# Patient Record
Sex: FEMALE | Race: WHITE | Employment: OTHER | URBAN - NONMETROPOLITAN AREA
[De-identification: names, ages, dates, MRNs, and addresses within clinical notes are randomized per-mention and may not be internally consistent; named-entity substitution may affect disease eponyms.]

---

## 2017-01-09 ENCOUNTER — TELEPHONE (OUTPATIENT)
Dept: FAMILY MEDICINE CLINIC | Age: 82
End: 2017-01-09

## 2017-01-09 ENCOUNTER — NURSE ONLY (OUTPATIENT)
Dept: FAMILY MEDICINE CLINIC | Age: 82
End: 2017-01-09

## 2017-01-09 DIAGNOSIS — E11.9 TYPE 2 DIABETES MELLITUS WITHOUT COMPLICATION, WITHOUT LONG-TERM CURRENT USE OF INSULIN (HCC): Primary | ICD-10-CM

## 2017-01-09 LAB — HBA1C MFR BLD: 7.5 %

## 2017-01-09 PROCEDURE — 83036 HEMOGLOBIN GLYCOSYLATED A1C: CPT | Performed by: FAMILY MEDICINE

## 2017-03-31 DIAGNOSIS — R80.9 PROTEINURIA: ICD-10-CM

## 2017-03-31 DIAGNOSIS — E11.9 TYPE 2 DIABETES MELLITUS WITHOUT COMPLICATION (HCC): ICD-10-CM

## 2017-03-31 DIAGNOSIS — I10 ESSENTIAL HYPERTENSION: ICD-10-CM

## 2017-03-31 DIAGNOSIS — E78.00 PURE HYPERCHOLESTEROLEMIA: ICD-10-CM

## 2017-04-04 RX ORDER — LISINOPRIL 30 MG/1
TABLET ORAL
Qty: 90 TABLET | Refills: 2 | OUTPATIENT
Start: 2017-04-04

## 2017-04-04 RX ORDER — BLOOD-GLUCOSE METER
KIT MISCELLANEOUS
Qty: 100 EACH | Refills: 2 | OUTPATIENT
Start: 2017-04-04

## 2017-04-04 RX ORDER — PRAVASTATIN SODIUM 40 MG
TABLET ORAL
Qty: 90 TABLET | Refills: 2 | OUTPATIENT
Start: 2017-04-04

## 2017-04-10 ENCOUNTER — OFFICE VISIT (OUTPATIENT)
Dept: FAMILY MEDICINE CLINIC | Age: 82
End: 2017-04-10

## 2017-04-10 VITALS
HEART RATE: 92 BPM | SYSTOLIC BLOOD PRESSURE: 132 MMHG | RESPIRATION RATE: 16 BRPM | HEIGHT: 63 IN | DIASTOLIC BLOOD PRESSURE: 80 MMHG | BODY MASS INDEX: 18.39 KG/M2 | WEIGHT: 103.8 LBS

## 2017-04-10 DIAGNOSIS — E78.00 PURE HYPERCHOLESTEROLEMIA: ICD-10-CM

## 2017-04-10 DIAGNOSIS — E11.29 TYPE 2 DIABETES MELLITUS WITH MICROALBUMINURIA, WITHOUT LONG-TERM CURRENT USE OF INSULIN (HCC): Primary | ICD-10-CM

## 2017-04-10 DIAGNOSIS — R80.9 TYPE 2 DIABETES MELLITUS WITH MICROALBUMINURIA, WITHOUT LONG-TERM CURRENT USE OF INSULIN (HCC): Primary | ICD-10-CM

## 2017-04-10 DIAGNOSIS — E11.9 TYPE 2 DIABETES MELLITUS WITHOUT COMPLICATION, WITHOUT LONG-TERM CURRENT USE OF INSULIN (HCC): ICD-10-CM

## 2017-04-10 DIAGNOSIS — E78.00 HYPERCHOLESTEROLEMIA: ICD-10-CM

## 2017-04-10 DIAGNOSIS — E11.29 PERSISTENT PROTEINURIA ASSOCIATED WITH TYPE 2 DIABETES MELLITUS (HCC): ICD-10-CM

## 2017-04-10 DIAGNOSIS — R80.9 PERSISTENT PROTEINURIA ASSOCIATED WITH TYPE 2 DIABETES MELLITUS (HCC): ICD-10-CM

## 2017-04-10 LAB
CREATININE URINE POCT: NORMAL
MICROALBUMIN/CREAT 24H UR: NORMAL MG/G{CREAT}
MICROALBUMIN/CREAT UR-RTO: NORMAL

## 2017-04-10 PROCEDURE — 82044 UR ALBUMIN SEMIQUANTITATIVE: CPT | Performed by: FAMILY MEDICINE

## 2017-04-10 PROCEDURE — G8427 DOCREV CUR MEDS BY ELIG CLIN: HCPCS | Performed by: FAMILY MEDICINE

## 2017-04-10 PROCEDURE — G8419 CALC BMI OUT NRM PARAM NOF/U: HCPCS | Performed by: FAMILY MEDICINE

## 2017-04-10 PROCEDURE — 99214 OFFICE O/P EST MOD 30 MIN: CPT | Performed by: FAMILY MEDICINE

## 2017-04-10 PROCEDURE — 1123F ACP DISCUSS/DSCN MKR DOCD: CPT | Performed by: FAMILY MEDICINE

## 2017-04-10 PROCEDURE — 1090F PRES/ABSN URINE INCON ASSESS: CPT | Performed by: FAMILY MEDICINE

## 2017-04-10 PROCEDURE — 1036F TOBACCO NON-USER: CPT | Performed by: FAMILY MEDICINE

## 2017-04-10 PROCEDURE — 4040F PNEUMOC VAC/ADMIN/RCVD: CPT | Performed by: FAMILY MEDICINE

## 2017-04-10 RX ORDER — CHOLECALCIFEROL (VITAMIN D3) 125 MCG
500 CAPSULE ORAL DAILY
COMMUNITY
End: 2017-10-16 | Stop reason: ALTCHOICE

## 2017-04-10 RX ORDER — LISINOPRIL 30 MG/1
30 TABLET ORAL DAILY
COMMUNITY
End: 2017-04-10 | Stop reason: SDUPTHER

## 2017-04-10 RX ORDER — ASPIRIN 81 MG/1
81 TABLET ORAL DAILY
Qty: 30 TABLET | Refills: 11 | COMMUNITY
Start: 2017-04-10 | End: 2018-04-11 | Stop reason: DRUGHIGH

## 2017-04-10 RX ORDER — LISINOPRIL 30 MG/1
30 TABLET ORAL DAILY
Qty: 90 TABLET | Refills: 3 | Status: SHIPPED | OUTPATIENT
Start: 2017-04-10 | End: 2018-04-11 | Stop reason: SDUPTHER

## 2017-04-10 RX ORDER — METFORMIN HYDROCHLORIDE 500 MG/1
TABLET, EXTENDED RELEASE ORAL
Qty: 180 TABLET | Refills: 3 | Status: SHIPPED | OUTPATIENT
Start: 2017-04-10 | End: 2017-10-16 | Stop reason: SDUPTHER

## 2017-04-10 RX ORDER — LANCETS 28 GAUGE
1 EACH MISCELLANEOUS DAILY
Qty: 100 EACH | Refills: 3 | Status: SHIPPED | OUTPATIENT
Start: 2017-04-10 | End: 2018-04-11 | Stop reason: SDUPTHER

## 2017-04-10 RX ORDER — PRAVASTATIN SODIUM 40 MG
40 TABLET ORAL EVERY EVENING
Qty: 90 TABLET | Refills: 3 | Status: SHIPPED | OUTPATIENT
Start: 2017-04-10 | End: 2018-04-11 | Stop reason: SDUPTHER

## 2017-04-10 ASSESSMENT — ENCOUNTER SYMPTOMS
VOMITING: 0
CONSTIPATION: 0
RHINORRHEA: 0
COUGH: 0
BACK PAIN: 0
SHORTNESS OF BREATH: 0
SORE THROAT: 0
DIARRHEA: 0
WHEEZING: 0
CHEST TIGHTNESS: 0
ABDOMINAL PAIN: 0
BLOOD IN STOOL: 0
NAUSEA: 0
EYE PAIN: 0

## 2017-04-10 ASSESSMENT — PATIENT HEALTH QUESTIONNAIRE - PHQ9
SUM OF ALL RESPONSES TO PHQ QUESTIONS 1-9: 0
2. FEELING DOWN, DEPRESSED OR HOPELESS: 0
1. LITTLE INTEREST OR PLEASURE IN DOING THINGS: 0
SUM OF ALL RESPONSES TO PHQ9 QUESTIONS 1 & 2: 0

## 2017-04-11 ENCOUNTER — TELEPHONE (OUTPATIENT)
Dept: FAMILY MEDICINE CLINIC | Age: 82
End: 2017-04-11

## 2017-04-11 DIAGNOSIS — E11.29 TYPE 2 DIABETES MELLITUS WITH MICROALBUMINURIA, WITHOUT LONG-TERM CURRENT USE OF INSULIN (HCC): Primary | ICD-10-CM

## 2017-04-11 DIAGNOSIS — R80.9 TYPE 2 DIABETES MELLITUS WITH MICROALBUMINURIA, WITHOUT LONG-TERM CURRENT USE OF INSULIN (HCC): Primary | ICD-10-CM

## 2017-04-11 LAB
ABSOLUTE BASO #: 0.1 K/UL (ref 0–0.1)
ABSOLUTE EOS #: 0.1 K/UL (ref 0.1–0.4)
ABSOLUTE LYMPH #: 1.3 K/UL (ref 0.8–5.2)
ABSOLUTE MONO #: 0.7 K/UL (ref 0.1–0.9)
ABSOLUTE NEUT #: 6.3 K/UL (ref 1.3–9.1)
ALBUMIN SERPL-MCNC: 4.5 G/DL (ref 3.2–5.3)
ALK PHOSPHATASE: 57 IU/L (ref 35–121)
ALT SERPL-CCNC: 9 IU/L (ref 5–59)
ANION GAP SERPL CALCULATED.3IONS-SCNC: 15 MMOL/L
AST SERPL-CCNC: 20 IU/L (ref 10–42)
AVERAGE GLUCOSE: 169 MG/DL (ref 66–114)
BASOPHILS RELATIVE PERCENT: 0.6 %
BILIRUB SERPL-MCNC: 0.6 MG/DL (ref 0.2–1.3)
BUN BLDV-MCNC: 14 MG/DL (ref 9–24)
CALCIUM SERPL-MCNC: 10.1 MG/DL (ref 8.7–10.8)
CHLORIDE BLD-SCNC: 98 MMOL/L (ref 95–111)
CHOLESTEROL/HDL RATIO: 2.5
CHOLESTEROL: 164 MG/DL
CO2: 29 MMOL/L (ref 21–32)
CREAT SERPL-MCNC: 0.7 MG/DL (ref 0.5–1.3)
EGFR AFRICAN AMERICAN: 95
EGFR IF NONAFRICAN AMERICAN: 79
EOSINOPHILS RELATIVE PERCENT: 0.7 %
GLUCOSE: 146 MG/DL (ref 70–100)
HBA1C MFR BLD: 7.5 % (ref 4.2–5.8)
HCT VFR BLD CALC: 37 % (ref 36–48)
HDLC SERPL-MCNC: 65 MG/DL (ref 40–60)
HEMOGLOBIN: 11.9 G/DL (ref 12–16)
LDL CHOLESTEROL CALCULATED: 85 MG/DL
LDL/HDL RATIO: 1.3
LYMPHOCYTE %: 15.7 %
MCH RBC QN AUTO: 30.1 PG (ref 27–34)
MCHC RBC AUTO-ENTMCNC: 32.2 G/DL (ref 31–36)
MCV RBC AUTO: 93.7 FL (ref 80–100)
MONOCYTES # BLD: 8 %
NEUTROPHILS RELATIVE PERCENT: 74.6 %
PDW BLD-RTO: 11.6 % (ref 10.8–14.8)
PLATELETS: 207 K/UL (ref 150–450)
POTASSIUM SERPL-SCNC: 4.6 MMOL/L (ref 3.5–5.4)
RBC: 3.95 M/UL (ref 4–5.5)
SODIUM BLD-SCNC: 137 MMOL/L (ref 134–147)
TOTAL PROTEIN: 7.2 G/DL (ref 5.8–8)
TRIGL SERPL-MCNC: 70 MG/DL
VLDLC SERPL CALC-MCNC: 14 MG/DL
WBC: 8.4 K/UL (ref 3.7–10.8)

## 2017-10-16 ENCOUNTER — OFFICE VISIT (OUTPATIENT)
Dept: FAMILY MEDICINE CLINIC | Age: 82
End: 2017-10-16
Payer: MEDICARE

## 2017-10-16 VITALS
DIASTOLIC BLOOD PRESSURE: 62 MMHG | BODY MASS INDEX: 18.54 KG/M2 | SYSTOLIC BLOOD PRESSURE: 120 MMHG | RESPIRATION RATE: 20 BRPM | HEART RATE: 72 BPM | HEIGHT: 63 IN | WEIGHT: 104.6 LBS

## 2017-10-16 DIAGNOSIS — R80.9 TYPE 2 DIABETES MELLITUS WITH MICROALBUMINURIA, WITHOUT LONG-TERM CURRENT USE OF INSULIN (HCC): Primary | ICD-10-CM

## 2017-10-16 DIAGNOSIS — E78.00 HYPERCHOLESTEROLEMIA: ICD-10-CM

## 2017-10-16 DIAGNOSIS — E11.9 TYPE 2 DIABETES MELLITUS WITHOUT COMPLICATION, WITHOUT LONG-TERM CURRENT USE OF INSULIN (HCC): ICD-10-CM

## 2017-10-16 DIAGNOSIS — E11.29 TYPE 2 DIABETES MELLITUS WITH MICROALBUMINURIA, WITHOUT LONG-TERM CURRENT USE OF INSULIN (HCC): Primary | ICD-10-CM

## 2017-10-16 LAB — HBA1C MFR BLD: 8.2 %

## 2017-10-16 PROCEDURE — 1090F PRES/ABSN URINE INCON ASSESS: CPT | Performed by: FAMILY MEDICINE

## 2017-10-16 PROCEDURE — 1036F TOBACCO NON-USER: CPT | Performed by: FAMILY MEDICINE

## 2017-10-16 PROCEDURE — 1123F ACP DISCUSS/DSCN MKR DOCD: CPT | Performed by: FAMILY MEDICINE

## 2017-10-16 PROCEDURE — 99213 OFFICE O/P EST LOW 20 MIN: CPT | Performed by: FAMILY MEDICINE

## 2017-10-16 PROCEDURE — G8427 DOCREV CUR MEDS BY ELIG CLIN: HCPCS | Performed by: FAMILY MEDICINE

## 2017-10-16 PROCEDURE — 83036 HEMOGLOBIN GLYCOSYLATED A1C: CPT | Performed by: FAMILY MEDICINE

## 2017-10-16 PROCEDURE — G8420 CALC BMI NORM PARAMETERS: HCPCS | Performed by: FAMILY MEDICINE

## 2017-10-16 PROCEDURE — 4040F PNEUMOC VAC/ADMIN/RCVD: CPT | Performed by: FAMILY MEDICINE

## 2017-10-16 PROCEDURE — G8484 FLU IMMUNIZE NO ADMIN: HCPCS | Performed by: FAMILY MEDICINE

## 2017-10-16 RX ORDER — METFORMIN HYDROCHLORIDE 500 MG/1
TABLET, EXTENDED RELEASE ORAL
Qty: 270 TABLET | Refills: 1 | Status: SHIPPED | OUTPATIENT
Start: 2017-10-16 | End: 2018-04-11 | Stop reason: SDUPTHER

## 2017-10-16 ASSESSMENT — ENCOUNTER SYMPTOMS
ABDOMINAL PAIN: 0
WHEEZING: 0
BLOOD IN STOOL: 0
VOMITING: 0
DIARRHEA: 0
SORE THROAT: 0
CONSTIPATION: 0
COUGH: 0
CHEST TIGHTNESS: 0
RHINORRHEA: 0
SHORTNESS OF BREATH: 0
EYE PAIN: 0
BACK PAIN: 0
NAUSEA: 0

## 2017-10-16 NOTE — PATIENT INSTRUCTIONS
Patient Education        Learning About Diabetes Food Guidelines  Your Care Instructions  Meal planning is important to manage diabetes. It helps keep your blood sugar at a target level (which you set with your doctor). You don't have to eat special foods. You can eat what your family eats, including sweets once in a while. But you do have to pay attention to how often you eat and how much you eat of certain foods. You may want to work with a dietitian or a certified diabetes educator (CDE) to help you plan meals and snacks. A dietitian or CDE can also help you lose weight if that is one of your goals. What should you know about eating carbs? Managing the amount of carbohydrate (carbs) you eat is an important part of healthy meals when you have diabetes. Carbohydrate is found in many foods. · Learn which foods have carbs. And learn the amounts of carbs in different foods. ¨ Bread, cereal, pasta, and rice have about 15 grams of carbs in a serving. A serving is 1 slice of bread (1 ounce), ½ cup of cooked cereal, or 1/3 cup of cooked pasta or rice. ¨ Fruits have 15 grams of carbs in a serving. A serving is 1 small fresh fruit, such as an apple or orange; ½ of a banana; ½ cup of cooked or canned fruit; ½ cup of fruit juice; 1 cup of melon or raspberries; or 2 tablespoons of dried fruit. ¨ Milk and no-sugar-added yogurt have 15 grams of carbs in a serving. A serving is 1 cup of milk or 2/3 cup of no-sugar-added yogurt. ¨ Starchy vegetables have 15 grams of carbs in a serving. A serving is ½ cup of mashed potatoes or sweet potato; 1 cup winter squash; ½ of a small baked potato; ½ cup of cooked beans; or ½ cup cooked corn or green peas. · Learn how much carbs to eat each day and at each meal. A dietitian or CDE can teach you how to keep track of the amount of carbs you eat. This is called carbohydrate counting. · If you are not sure how to count carbohydrate grams, use the Plate Method to plan meals.  It is a

## 2017-10-16 NOTE — PROGRESS NOTES
Subjective:      Patient ID: Ronnell Alatorre is a 80 y.o. female. HPI  Pt here for 6 month DM check up. The sugars have been higher. Was drinking cappacinos, better since she stopped them. REviewed BMI of 19. Normal.  , nonsmoker, pmh reviewed. Review of Systems   Constitutional: Negative for chills, fatigue, fever and unexpected weight change. HENT: Negative for congestion, ear pain, rhinorrhea and sore throat. Eyes: Negative for pain and visual disturbance. Respiratory: Negative for cough, chest tightness, shortness of breath and wheezing. Cardiovascular: Negative for chest pain and palpitations. Gastrointestinal: Negative for abdominal pain, blood in stool, constipation, diarrhea, nausea and vomiting. Genitourinary: Negative for difficulty urinating, frequency, hematuria and urgency. Musculoskeletal: Negative for back pain, joint swelling, myalgias and neck pain. Skin: Negative for rash. Neurological: Negative for dizziness and headaches. Hematological: Negative for adenopathy. Does not bruise/bleed easily. Psychiatric/Behavioral: Negative for behavioral problems and sleep disturbance. The patient is not nervous/anxious. Objective:   Physical Exam   Constitutional: She is oriented to person, place, and time. She appears well-developed and well-nourished. HENT:   Head: Normocephalic and atraumatic. Right Ear: External ear normal.   Left Ear: External ear normal.   Nose: Nose normal.   Mouth/Throat: Oropharynx is clear and moist.   Eyes: EOM are normal. Pupils are equal, round, and reactive to light. Neck: Neck supple. No thyromegaly present. Cardiovascular: Normal rate, regular rhythm and normal heart sounds. Pulmonary/Chest: Breath sounds normal. She has no wheezes. She has no rales. Abdominal: Soft. Bowel sounds are normal. There is no tenderness. There is no rebound and no guarding. Musculoskeletal: Normal range of motion. She exhibits no edema.

## 2018-01-16 ENCOUNTER — TELEPHONE (OUTPATIENT)
Dept: FAMILY MEDICINE CLINIC | Age: 83
End: 2018-01-16

## 2018-01-16 ENCOUNTER — NURSE ONLY (OUTPATIENT)
Dept: FAMILY MEDICINE CLINIC | Age: 83
End: 2018-01-16
Payer: MEDICARE

## 2018-01-16 DIAGNOSIS — E11.9 TYPE 2 DIABETES MELLITUS WITHOUT COMPLICATION, WITHOUT LONG-TERM CURRENT USE OF INSULIN (HCC): Primary | ICD-10-CM

## 2018-01-16 LAB — HBA1C MFR BLD: 7.6 %

## 2018-01-16 PROCEDURE — 83036 HEMOGLOBIN GLYCOSYLATED A1C: CPT | Performed by: FAMILY MEDICINE

## 2018-01-16 NOTE — TELEPHONE ENCOUNTER
----- Message from Darrin Fontaine MD sent at 1/16/2018 12:19 PM EST -----  a1c is better 8.2 to 7.6. Continue present and recheck a1c in 6 months.

## 2018-01-31 ENCOUNTER — NURSE ONLY (OUTPATIENT)
Dept: FAMILY MEDICINE CLINIC | Age: 83
End: 2018-01-31
Payer: MEDICARE

## 2018-01-31 DIAGNOSIS — Z23 NEED FOR INFLUENZA VACCINATION: Primary | ICD-10-CM

## 2018-01-31 PROCEDURE — 90688 IIV4 VACCINE SPLT 0.5 ML IM: CPT | Performed by: FAMILY MEDICINE

## 2018-01-31 PROCEDURE — G0008 ADMIN INFLUENZA VIRUS VAC: HCPCS | Performed by: FAMILY MEDICINE

## 2018-01-31 NOTE — PROGRESS NOTES
After obtaining consent, and per orders of Dr. Lacie Reese, injection of Influenza vaccine 0.5 mL IM left given by Raeangel Aranda. Patient tolerated well.

## 2018-03-29 DIAGNOSIS — E11.9 TYPE 2 DIABETES MELLITUS WITHOUT COMPLICATION, WITHOUT LONG-TERM CURRENT USE OF INSULIN (HCC): ICD-10-CM

## 2018-04-02 RX ORDER — METFORMIN HYDROCHLORIDE 500 MG/1
TABLET, EXTENDED RELEASE ORAL
Qty: 270 TABLET | Refills: 1 | OUTPATIENT
Start: 2018-04-02

## 2018-04-05 DIAGNOSIS — E11.9 TYPE 2 DIABETES MELLITUS WITHOUT COMPLICATION, WITHOUT LONG-TERM CURRENT USE OF INSULIN (HCC): ICD-10-CM

## 2018-04-05 DIAGNOSIS — R80.9 PERSISTENT PROTEINURIA ASSOCIATED WITH TYPE 2 DIABETES MELLITUS (HCC): ICD-10-CM

## 2018-04-05 DIAGNOSIS — E78.00 PURE HYPERCHOLESTEROLEMIA: ICD-10-CM

## 2018-04-05 DIAGNOSIS — E11.29 PERSISTENT PROTEINURIA ASSOCIATED WITH TYPE 2 DIABETES MELLITUS (HCC): ICD-10-CM

## 2018-04-05 RX ORDER — BLOOD-GLUCOSE METER
KIT MISCELLANEOUS
Qty: 100 EACH | Refills: 3 | OUTPATIENT
Start: 2018-04-05

## 2018-04-05 RX ORDER — LISINOPRIL 30 MG/1
TABLET ORAL
Qty: 90 TABLET | Refills: 3 | OUTPATIENT
Start: 2018-04-05

## 2018-04-05 RX ORDER — PRAVASTATIN SODIUM 40 MG
TABLET ORAL
Qty: 90 TABLET | Refills: 3 | OUTPATIENT
Start: 2018-04-05

## 2018-04-11 ENCOUNTER — OFFICE VISIT (OUTPATIENT)
Dept: FAMILY MEDICINE CLINIC | Age: 83
End: 2018-04-11
Payer: MEDICARE

## 2018-04-11 VITALS
DIASTOLIC BLOOD PRESSURE: 68 MMHG | HEART RATE: 68 BPM | BODY MASS INDEX: 18.68 KG/M2 | HEIGHT: 63 IN | RESPIRATION RATE: 12 BRPM | WEIGHT: 105.4 LBS | SYSTOLIC BLOOD PRESSURE: 112 MMHG

## 2018-04-11 DIAGNOSIS — E78.00 HYPERCHOLESTEREMIA: ICD-10-CM

## 2018-04-11 DIAGNOSIS — R80.9 TYPE 2 DIABETES MELLITUS WITH MICROALBUMINURIA, WITHOUT LONG-TERM CURRENT USE OF INSULIN (HCC): ICD-10-CM

## 2018-04-11 DIAGNOSIS — R80.9 PERSISTENT PROTEINURIA ASSOCIATED WITH TYPE 2 DIABETES MELLITUS (HCC): ICD-10-CM

## 2018-04-11 DIAGNOSIS — E11.29 TYPE 2 DIABETES MELLITUS WITH MICROALBUMINURIA, WITHOUT LONG-TERM CURRENT USE OF INSULIN (HCC): ICD-10-CM

## 2018-04-11 DIAGNOSIS — E11.29 PERSISTENT PROTEINURIA ASSOCIATED WITH TYPE 2 DIABETES MELLITUS (HCC): ICD-10-CM

## 2018-04-11 DIAGNOSIS — E11.9 TYPE 2 DIABETES MELLITUS WITHOUT COMPLICATION, WITHOUT LONG-TERM CURRENT USE OF INSULIN (HCC): Primary | ICD-10-CM

## 2018-04-11 DIAGNOSIS — E78.00 PURE HYPERCHOLESTEROLEMIA: ICD-10-CM

## 2018-04-11 PROCEDURE — G8420 CALC BMI NORM PARAMETERS: HCPCS | Performed by: FAMILY MEDICINE

## 2018-04-11 PROCEDURE — 1123F ACP DISCUSS/DSCN MKR DOCD: CPT | Performed by: FAMILY MEDICINE

## 2018-04-11 PROCEDURE — 1036F TOBACCO NON-USER: CPT | Performed by: FAMILY MEDICINE

## 2018-04-11 PROCEDURE — 82044 UR ALBUMIN SEMIQUANTITATIVE: CPT | Performed by: FAMILY MEDICINE

## 2018-04-11 PROCEDURE — G8427 DOCREV CUR MEDS BY ELIG CLIN: HCPCS | Performed by: FAMILY MEDICINE

## 2018-04-11 PROCEDURE — 99214 OFFICE O/P EST MOD 30 MIN: CPT | Performed by: FAMILY MEDICINE

## 2018-04-11 PROCEDURE — 1090F PRES/ABSN URINE INCON ASSESS: CPT | Performed by: FAMILY MEDICINE

## 2018-04-11 PROCEDURE — 4040F PNEUMOC VAC/ADMIN/RCVD: CPT | Performed by: FAMILY MEDICINE

## 2018-04-11 RX ORDER — METFORMIN HYDROCHLORIDE 500 MG/1
TABLET, EXTENDED RELEASE ORAL
Qty: 270 TABLET | Refills: 3 | Status: SHIPPED | OUTPATIENT
Start: 2018-04-11 | End: 2019-04-10 | Stop reason: SDUPTHER

## 2018-04-11 RX ORDER — PRAVASTATIN SODIUM 40 MG
40 TABLET ORAL EVERY EVENING
Qty: 90 TABLET | Refills: 3 | Status: SHIPPED | OUTPATIENT
Start: 2018-04-11 | End: 2019-04-10 | Stop reason: SDUPTHER

## 2018-04-11 RX ORDER — LANCETS 28 GAUGE
1 EACH MISCELLANEOUS DAILY
Qty: 100 EACH | Refills: 3 | Status: SHIPPED | OUTPATIENT
Start: 2018-04-11 | End: 2019-04-10 | Stop reason: SDUPTHER

## 2018-04-11 RX ORDER — LISINOPRIL 30 MG/1
30 TABLET ORAL DAILY
Qty: 90 TABLET | Refills: 3 | Status: SHIPPED | OUTPATIENT
Start: 2018-04-11 | End: 2019-04-10 | Stop reason: SDUPTHER

## 2018-04-11 ASSESSMENT — ENCOUNTER SYMPTOMS
ABDOMINAL PAIN: 0
VOMITING: 0
WHEEZING: 0
DIARRHEA: 0
CONSTIPATION: 0
NAUSEA: 0
SHORTNESS OF BREATH: 0
EYE PAIN: 0
CHEST TIGHTNESS: 0
COUGH: 0
BLOOD IN STOOL: 0
SORE THROAT: 0
RHINORRHEA: 0
BACK PAIN: 0

## 2018-04-11 ASSESSMENT — PATIENT HEALTH QUESTIONNAIRE - PHQ9
SUM OF ALL RESPONSES TO PHQ QUESTIONS 1-9: 0
1. LITTLE INTEREST OR PLEASURE IN DOING THINGS: 0
SUM OF ALL RESPONSES TO PHQ9 QUESTIONS 1 & 2: 0
2. FEELING DOWN, DEPRESSED OR HOPELESS: 0

## 2018-04-12 ENCOUNTER — TELEPHONE (OUTPATIENT)
Dept: FAMILY MEDICINE CLINIC | Age: 83
End: 2018-04-12

## 2018-04-12 DIAGNOSIS — E11.9 TYPE 2 DIABETES MELLITUS WITHOUT COMPLICATION, WITHOUT LONG-TERM CURRENT USE OF INSULIN (HCC): Primary | ICD-10-CM

## 2018-04-12 LAB
ABSOLUTE BASO #: 0 K/UL (ref 0–0.1)
ABSOLUTE EOS #: 0 K/UL (ref 0.1–0.4)
ABSOLUTE LYMPH #: 1 K/UL (ref 0.8–5.2)
ABSOLUTE MONO #: 0.6 K/UL (ref 0.1–0.9)
ABSOLUTE NEUT #: 5.9 K/UL (ref 1.3–9.1)
ALBUMIN SERPL-MCNC: 4.5 G/DL (ref 3.5–5.2)
ALK PHOSPHATASE: 63 U/L (ref 30–146)
ALT SERPL-CCNC: 11 U/L (ref 5–40)
ANION GAP SERPL CALCULATED.3IONS-SCNC: 13 MEQ/L (ref 10–19)
AST SERPL-CCNC: 18 U/L (ref 9–40)
AVERAGE GLUCOSE: 174 MG/DL (ref 66–114)
BASOPHILS RELATIVE PERCENT: 0.4 %
BILIRUB SERPL-MCNC: 0.4 MG/DL
BUN BLDV-MCNC: 15 MG/DL (ref 8–23)
CALCIUM SERPL-MCNC: 9.8 MG/DL (ref 8.5–10.5)
CHLORIDE BLD-SCNC: 93 MEQ/L (ref 95–107)
CHOLESTEROL/HDL RATIO: 2.7
CHOLESTEROL: 170 MG/DL
CO2: 29 MEQ/L (ref 19–31)
CREAT SERPL-MCNC: 0.7 MG/DL (ref 0.6–1.3)
CREATININE URINE POCT: NORMAL
EGFR AFRICAN AMERICAN: 87.8 ML/MIN/1.73 M2
EGFR IF NONAFRICAN AMERICAN: 75.7 ML/MIN/1.73 M2
EOSINOPHILS RELATIVE PERCENT: 0.3 %
GLUCOSE: 182 MG/DL (ref 70–99)
HBA1C MFR BLD: 7.7 % (ref 4.2–5.8)
HCT VFR BLD CALC: 35.7 % (ref 36–48)
HDLC SERPL-MCNC: 63 MG/DL
HEMOGLOBIN: 11.8 G/DL (ref 12–16)
LDL CHOLESTEROL CALCULATED: 93 MG/DL
LDL/HDL RATIO: 1.5
LYMPHOCYTE %: 13.5 %
MCH RBC QN AUTO: 30.3 PG (ref 27–34)
MCHC RBC AUTO-ENTMCNC: 33.1 G/DL (ref 31–36)
MCV RBC AUTO: 91.8 FL (ref 80–100)
MICROALBUMIN/CREAT 24H UR: 50 MG/G{CREAT}
MICROALBUMIN/CREAT UR-RTO: NORMAL
MONOCYTES # BLD: 8 %
NEUTROPHILS RELATIVE PERCENT: 77.5 %
PDW BLD-RTO: 12 % (ref 10.8–14.8)
PLATELETS: 203 K/UL (ref 150–450)
POTASSIUM SERPL-SCNC: 4.9 MEQ/L (ref 3.5–5.4)
RBC: 3.89 M/UL (ref 4–5.5)
SODIUM BLD-SCNC: 135 MEQ/L (ref 135–146)
TOTAL PROTEIN: 7.7 G/DL (ref 6.1–8.3)
TRIGL SERPL-MCNC: 71 MG/DL
VLDLC SERPL CALC-MCNC: 14 MG/DL
WBC: 7.6 K/UL (ref 3.7–10.8)

## 2018-07-12 ENCOUNTER — NURSE ONLY (OUTPATIENT)
Dept: FAMILY MEDICINE CLINIC | Age: 83
End: 2018-07-12
Payer: MEDICARE

## 2018-07-12 ENCOUNTER — TELEPHONE (OUTPATIENT)
Dept: FAMILY MEDICINE CLINIC | Age: 83
End: 2018-07-12

## 2018-07-12 DIAGNOSIS — E11.29 TYPE 2 DIABETES MELLITUS WITH MICROALBUMINURIA, WITHOUT LONG-TERM CURRENT USE OF INSULIN (HCC): Primary | ICD-10-CM

## 2018-07-12 DIAGNOSIS — R80.9 TYPE 2 DIABETES MELLITUS WITH MICROALBUMINURIA, WITHOUT LONG-TERM CURRENT USE OF INSULIN (HCC): Primary | ICD-10-CM

## 2018-07-12 LAB — HBA1C MFR BLD: 8.8 %

## 2018-07-12 PROCEDURE — 83036 HEMOGLOBIN GLYCOSYLATED A1C: CPT | Performed by: FAMILY MEDICINE

## 2018-07-12 RX ORDER — GLIMEPIRIDE 2 MG/1
2 TABLET ORAL
Qty: 90 TABLET | Refills: 2 | Status: SHIPPED | OUTPATIENT
Start: 2018-07-12 | End: 2019-04-10 | Stop reason: SDUPTHER

## 2018-07-12 NOTE — TELEPHONE ENCOUNTER
Patient notified a1c is higher, 7.7 to 8.8. Shannon Figueroa Notified her sugars are getting too high   Pt is agreeable to adding amaryl 2 mg daily and recheck a1c in 3 months.   Requests order to [facility]  Lab order mailed to pt

## 2018-07-12 NOTE — TELEPHONE ENCOUNTER
----- Message from Ray Zpeeda MD sent at 7/12/2018 11:36 AM EDT -----  a1c is higher, 7.7 to 8.8. Krupaclifford Lerma Getting too high. Recommend add amaryl 2 mg daily and recheck a1c in 3 months if agreeable.

## 2018-10-06 ENCOUNTER — CARE COORDINATION (OUTPATIENT)
Dept: CARE COORDINATION | Age: 83
End: 2018-10-06

## 2018-10-12 ENCOUNTER — TELEPHONE (OUTPATIENT)
Dept: FAMILY MEDICINE CLINIC | Age: 83
End: 2018-10-12

## 2018-10-12 ENCOUNTER — NURSE ONLY (OUTPATIENT)
Dept: FAMILY MEDICINE CLINIC | Age: 83
End: 2018-10-12
Payer: MEDICARE

## 2018-10-12 DIAGNOSIS — Z23 NEED FOR INFLUENZA VACCINATION: Primary | ICD-10-CM

## 2018-10-12 DIAGNOSIS — R80.9 TYPE 2 DIABETES MELLITUS WITH MICROALBUMINURIA, WITHOUT LONG-TERM CURRENT USE OF INSULIN (HCC): Primary | ICD-10-CM

## 2018-10-12 DIAGNOSIS — R80.9 TYPE 2 DIABETES MELLITUS WITH MICROALBUMINURIA, WITHOUT LONG-TERM CURRENT USE OF INSULIN (HCC): ICD-10-CM

## 2018-10-12 DIAGNOSIS — E11.29 TYPE 2 DIABETES MELLITUS WITH MICROALBUMINURIA, WITHOUT LONG-TERM CURRENT USE OF INSULIN (HCC): Primary | ICD-10-CM

## 2018-10-12 DIAGNOSIS — E11.29 TYPE 2 DIABETES MELLITUS WITH MICROALBUMINURIA, WITHOUT LONG-TERM CURRENT USE OF INSULIN (HCC): ICD-10-CM

## 2018-10-12 LAB — HBA1C MFR BLD: 7.5 %

## 2018-10-12 PROCEDURE — G0008 ADMIN INFLUENZA VIRUS VAC: HCPCS | Performed by: FAMILY MEDICINE

## 2018-10-12 PROCEDURE — 83036 HEMOGLOBIN GLYCOSYLATED A1C: CPT | Performed by: FAMILY MEDICINE

## 2018-10-12 PROCEDURE — 90688 IIV4 VACCINE SPLT 0.5 ML IM: CPT | Performed by: FAMILY MEDICINE

## 2018-10-12 NOTE — TELEPHONE ENCOUNTER
----- Message from Kenia Aguayo MD sent at 10/12/2018 10:35 AM EDT -----  a1c is better. Good work 8.8 to 7.5. Continue present and recheck a1c in 3 months.

## 2019-01-10 ENCOUNTER — TELEPHONE (OUTPATIENT)
Dept: FAMILY MEDICINE CLINIC | Age: 84
End: 2019-01-10

## 2019-01-10 ENCOUNTER — NURSE ONLY (OUTPATIENT)
Dept: FAMILY MEDICINE CLINIC | Age: 84
End: 2019-01-10
Payer: MEDICARE

## 2019-01-10 DIAGNOSIS — R80.9 TYPE 2 DIABETES MELLITUS WITH MICROALBUMINURIA, WITHOUT LONG-TERM CURRENT USE OF INSULIN (HCC): Primary | ICD-10-CM

## 2019-01-10 DIAGNOSIS — E11.29 TYPE 2 DIABETES MELLITUS WITH MICROALBUMINURIA, WITHOUT LONG-TERM CURRENT USE OF INSULIN (HCC): Primary | ICD-10-CM

## 2019-01-10 LAB — HBA1C MFR BLD: 7.3 %

## 2019-01-10 PROCEDURE — 83036 HEMOGLOBIN GLYCOSYLATED A1C: CPT | Performed by: FAMILY MEDICINE

## 2019-03-21 DIAGNOSIS — R80.9 TYPE 2 DIABETES MELLITUS WITH MICROALBUMINURIA, WITHOUT LONG-TERM CURRENT USE OF INSULIN (HCC): ICD-10-CM

## 2019-03-21 DIAGNOSIS — E11.29 TYPE 2 DIABETES MELLITUS WITH MICROALBUMINURIA, WITHOUT LONG-TERM CURRENT USE OF INSULIN (HCC): ICD-10-CM

## 2019-03-25 RX ORDER — LANCETS 28 GAUGE
EACH MISCELLANEOUS
Qty: 100 EACH | Refills: 3 | OUTPATIENT
Start: 2019-03-25

## 2019-03-25 RX ORDER — GLIMEPIRIDE 2 MG/1
TABLET ORAL
Qty: 90 TABLET | Refills: 2 | OUTPATIENT
Start: 2019-03-25

## 2019-04-06 DIAGNOSIS — E11.9 TYPE 2 DIABETES MELLITUS WITHOUT COMPLICATION, WITHOUT LONG-TERM CURRENT USE OF INSULIN (HCC): ICD-10-CM

## 2019-04-06 DIAGNOSIS — E78.00 HYPERCHOLESTEREMIA: ICD-10-CM

## 2019-04-06 DIAGNOSIS — E11.29 PERSISTENT PROTEINURIA ASSOCIATED WITH TYPE 2 DIABETES MELLITUS (HCC): ICD-10-CM

## 2019-04-06 DIAGNOSIS — R80.9 PERSISTENT PROTEINURIA ASSOCIATED WITH TYPE 2 DIABETES MELLITUS (HCC): ICD-10-CM

## 2019-04-06 DIAGNOSIS — E78.00 PURE HYPERCHOLESTEROLEMIA: ICD-10-CM

## 2019-04-08 RX ORDER — METFORMIN HYDROCHLORIDE 500 MG/1
TABLET, EXTENDED RELEASE ORAL
Qty: 270 TABLET | Refills: 3 | OUTPATIENT
Start: 2019-04-08

## 2019-04-08 RX ORDER — PRAVASTATIN SODIUM 40 MG
TABLET ORAL
Qty: 90 TABLET | Refills: 3 | OUTPATIENT
Start: 2019-04-08

## 2019-04-08 RX ORDER — LISINOPRIL 30 MG/1
TABLET ORAL
Qty: 90 TABLET | Refills: 3 | OUTPATIENT
Start: 2019-04-08

## 2019-04-08 RX ORDER — BLOOD-GLUCOSE METER
KIT MISCELLANEOUS
Qty: 100 EACH | Refills: 3 | OUTPATIENT
Start: 2019-04-08

## 2019-04-08 NOTE — TELEPHONE ENCOUNTER
Mj Colón needs refill of   Requested Prescriptions     Pending Prescriptions Disp Refills    metFORMIN (GLUCOPHAGE-XR) 500 MG extended release tablet [Pharmacy Med Name: METFORMIN HCL ER TABS 500MG] 270 tablet 3     Sig: TAKE 3 TABLETS WITH SUPPER    pravastatin (PRAVACHOL) 40 MG tablet [Pharmacy Med Name: PRAVASTATIN TABS 40MG] 90 tablet 3     Sig: TAKE 1 TABLET EVERY EVENING    FREESTYLE LITE strip [Pharmacy Med Name: FREESTYLE LITE STRIPS 50'S] 100 each 3     Sig: USE DAILY TO CHECK BLOOD SUGAR    lisinopril (PRINIVIL;ZESTRIL) 30 MG tablet [Pharmacy Med Name: LISINOPRIL TABS 30MG] 90 tablet 3     Sig: TAKE 1 TABLET DAILY        Last Filled on:  4/11/2018    Last Visit Date:  4/11/2018    Next Visit Date:    4/10/2019

## 2019-04-10 ENCOUNTER — OFFICE VISIT (OUTPATIENT)
Dept: FAMILY MEDICINE CLINIC | Age: 84
End: 2019-04-10
Payer: MEDICARE

## 2019-04-10 VITALS
DIASTOLIC BLOOD PRESSURE: 70 MMHG | HEIGHT: 62 IN | WEIGHT: 112.8 LBS | RESPIRATION RATE: 16 BRPM | SYSTOLIC BLOOD PRESSURE: 136 MMHG | HEART RATE: 84 BPM | BODY MASS INDEX: 20.76 KG/M2

## 2019-04-10 DIAGNOSIS — E11.29 PERSISTENT PROTEINURIA ASSOCIATED WITH TYPE 2 DIABETES MELLITUS (HCC): ICD-10-CM

## 2019-04-10 DIAGNOSIS — R80.9 PERSISTENT PROTEINURIA ASSOCIATED WITH TYPE 2 DIABETES MELLITUS (HCC): ICD-10-CM

## 2019-04-10 DIAGNOSIS — R80.9 TYPE 2 DIABETES MELLITUS WITH MICROALBUMINURIA, WITHOUT LONG-TERM CURRENT USE OF INSULIN (HCC): ICD-10-CM

## 2019-04-10 DIAGNOSIS — E11.29 TYPE 2 DIABETES MELLITUS WITH MICROALBUMINURIA, WITHOUT LONG-TERM CURRENT USE OF INSULIN (HCC): ICD-10-CM

## 2019-04-10 DIAGNOSIS — E11.9 TYPE 2 DIABETES MELLITUS WITHOUT COMPLICATION, WITHOUT LONG-TERM CURRENT USE OF INSULIN (HCC): ICD-10-CM

## 2019-04-10 DIAGNOSIS — E78.00 HYPERCHOLESTEREMIA: ICD-10-CM

## 2019-04-10 DIAGNOSIS — E78.00 PURE HYPERCHOLESTEROLEMIA: ICD-10-CM

## 2019-04-10 DIAGNOSIS — Z00.00 ROUTINE GENERAL MEDICAL EXAMINATION AT A HEALTH CARE FACILITY: Primary | ICD-10-CM

## 2019-04-10 LAB
ALBUMIN SERPL-MCNC: 4.2 G/DL (ref 3.5–5.1)
ALP BLD-CCNC: 62 U/L (ref 38–126)
ALT SERPL-CCNC: 9 U/L (ref 11–66)
ANION GAP SERPL CALCULATED.3IONS-SCNC: 15 MEQ/L (ref 8–16)
AST SERPL-CCNC: 21 U/L (ref 5–40)
AVERAGE GLUCOSE: 162 MG/DL (ref 70–126)
BASOPHILS # BLD: 0.4 %
BASOPHILS ABSOLUTE: 0 THOU/MM3 (ref 0–0.1)
BILIRUB SERPL-MCNC: 0.3 MG/DL (ref 0.3–1.2)
BUN BLDV-MCNC: 19 MG/DL (ref 7–22)
CALCIUM SERPL-MCNC: 9.7 MG/DL (ref 8.5–10.5)
CHLORIDE BLD-SCNC: 100 MEQ/L (ref 98–111)
CHOLESTEROL, TOTAL: 150 MG/DL (ref 100–199)
CO2: 24 MEQ/L (ref 23–33)
CREAT SERPL-MCNC: 0.7 MG/DL (ref 0.4–1.2)
EOSINOPHIL # BLD: 0.2 %
EOSINOPHILS ABSOLUTE: 0 THOU/MM3 (ref 0–0.4)
ERYTHROCYTE [DISTWIDTH] IN BLOOD BY AUTOMATED COUNT: 12.7 % (ref 11.5–14.5)
ERYTHROCYTE [DISTWIDTH] IN BLOOD BY AUTOMATED COUNT: 45.6 FL (ref 35–45)
GFR SERPL CREATININE-BSD FRML MDRD: 78 ML/MIN/1.73M2
GLUCOSE BLD-MCNC: 115 MG/DL (ref 70–108)
HBA1C MFR BLD: 7.4 % (ref 4.4–6.4)
HCT VFR BLD CALC: 36.2 % (ref 37–47)
HDLC SERPL-MCNC: 53 MG/DL
HEMOGLOBIN: 11.6 GM/DL (ref 12–16)
IMMATURE GRANS (ABS): 0.02 THOU/MM3 (ref 0–0.07)
IMMATURE GRANULOCYTES: 0.2 %
LDL CHOLESTEROL CALCULATED: 83 MG/DL
LYMPHOCYTES # BLD: 11.8 %
LYMPHOCYTES ABSOLUTE: 1 THOU/MM3 (ref 1–4.8)
MCH RBC QN AUTO: 31.4 PG (ref 26–33)
MCHC RBC AUTO-ENTMCNC: 32 GM/DL (ref 32.2–35.5)
MCV RBC AUTO: 98.1 FL (ref 81–99)
MICROALB/CREAT RATIO POC: > 300 MG/G
MICROALBUMIN/CREAT UR-RTO: 150 MG/L
MONOCYTES # BLD: 9.5 %
MONOCYTES ABSOLUTE: 0.8 THOU/MM3 (ref 0.4–1.3)
NUCLEATED RED BLOOD CELLS: 0 /100 WBC
PLATELET # BLD: 197 THOU/MM3 (ref 130–400)
PMV BLD AUTO: 11.3 FL (ref 9.4–12.4)
POC CREATININE: 50 MG/DL
POTASSIUM SERPL-SCNC: 4.8 MEQ/L (ref 3.5–5.2)
RBC # BLD: 3.69 MILL/MM3 (ref 4.2–5.4)
SEG NEUTROPHILS: 77.9 %
SEGMENTED NEUTROPHILS ABSOLUTE COUNT: 6.4 THOU/MM3 (ref 1.8–7.7)
SODIUM BLD-SCNC: 139 MEQ/L (ref 135–145)
TOTAL PROTEIN: 7.6 G/DL (ref 6.1–8)
TRIGL SERPL-MCNC: 70 MG/DL (ref 0–199)
WBC # BLD: 8.2 THOU/MM3 (ref 4.8–10.8)

## 2019-04-10 PROCEDURE — 4040F PNEUMOC VAC/ADMIN/RCVD: CPT | Performed by: FAMILY MEDICINE

## 2019-04-10 PROCEDURE — 1090F PRES/ABSN URINE INCON ASSESS: CPT | Performed by: FAMILY MEDICINE

## 2019-04-10 PROCEDURE — 36415 COLL VENOUS BLD VENIPUNCTURE: CPT | Performed by: FAMILY MEDICINE

## 2019-04-10 PROCEDURE — 82044 UR ALBUMIN SEMIQUANTITATIVE: CPT | Performed by: FAMILY MEDICINE

## 2019-04-10 PROCEDURE — 1036F TOBACCO NON-USER: CPT | Performed by: FAMILY MEDICINE

## 2019-04-10 PROCEDURE — G8427 DOCREV CUR MEDS BY ELIG CLIN: HCPCS | Performed by: FAMILY MEDICINE

## 2019-04-10 PROCEDURE — G8420 CALC BMI NORM PARAMETERS: HCPCS | Performed by: FAMILY MEDICINE

## 2019-04-10 PROCEDURE — 1123F ACP DISCUSS/DSCN MKR DOCD: CPT | Performed by: FAMILY MEDICINE

## 2019-04-10 PROCEDURE — G0438 PPPS, INITIAL VISIT: HCPCS | Performed by: FAMILY MEDICINE

## 2019-04-10 PROCEDURE — 99214 OFFICE O/P EST MOD 30 MIN: CPT | Performed by: FAMILY MEDICINE

## 2019-04-10 RX ORDER — PRAVASTATIN SODIUM 40 MG
40 TABLET ORAL EVERY EVENING
Qty: 90 TABLET | Refills: 3 | Status: SHIPPED | OUTPATIENT
Start: 2019-04-10 | End: 2020-04-01 | Stop reason: SDUPTHER

## 2019-04-10 RX ORDER — ASPIRIN 81 MG/1
81 TABLET ORAL DAILY
COMMUNITY

## 2019-04-10 RX ORDER — GLIMEPIRIDE 2 MG/1
2 TABLET ORAL
Qty: 90 TABLET | Refills: 3 | Status: SHIPPED | OUTPATIENT
Start: 2019-04-10 | End: 2020-04-01 | Stop reason: SDUPTHER

## 2019-04-10 RX ORDER — LISINOPRIL 30 MG/1
30 TABLET ORAL DAILY
Qty: 90 TABLET | Refills: 3 | Status: SHIPPED | OUTPATIENT
Start: 2019-04-10 | End: 2020-04-01 | Stop reason: SDUPTHER

## 2019-04-10 RX ORDER — METFORMIN HYDROCHLORIDE 500 MG/1
TABLET, EXTENDED RELEASE ORAL
Qty: 270 TABLET | Refills: 3 | Status: SHIPPED | OUTPATIENT
Start: 2019-04-10 | End: 2020-04-01 | Stop reason: SDUPTHER

## 2019-04-10 RX ORDER — LANCETS 28 GAUGE
1 EACH MISCELLANEOUS DAILY
Qty: 100 EACH | Refills: 3 | Status: SHIPPED | OUTPATIENT
Start: 2019-04-10 | End: 2020-04-01 | Stop reason: SDUPTHER

## 2019-04-10 ASSESSMENT — ENCOUNTER SYMPTOMS
BLOOD IN STOOL: 0
WHEEZING: 0
VOMITING: 0
ABDOMINAL PAIN: 0
DIARRHEA: 0
SHORTNESS OF BREATH: 0
EYE PAIN: 0
CHEST TIGHTNESS: 0
CONSTIPATION: 0
SORE THROAT: 0
NAUSEA: 0
BACK PAIN: 0
COUGH: 0
RHINORRHEA: 0

## 2019-04-10 ASSESSMENT — LIFESTYLE VARIABLES: HOW OFTEN DO YOU HAVE A DRINK CONTAINING ALCOHOL: 0

## 2019-04-10 ASSESSMENT — PATIENT HEALTH QUESTIONNAIRE - PHQ9
SUM OF ALL RESPONSES TO PHQ QUESTIONS 1-9: 0
SUM OF ALL RESPONSES TO PHQ QUESTIONS 1-9: 0

## 2019-04-10 ASSESSMENT — ANXIETY QUESTIONNAIRES: GAD7 TOTAL SCORE: 1

## 2019-04-10 NOTE — PROGRESS NOTES
Subjective:      Patient ID: Gabbie Chan is a 80 y.o. female. HPI  Pt here for annual exam and med refills. Reviewed BMI of 21. Normal.  Reviewed health maintenance. Denies any current problems, is feeling well. , non smoker, pmh reviewed. Lives with son. Review of Systems   Constitutional: Negative for chills, fatigue, fever and unexpected weight change. HENT: Negative for congestion, ear pain, rhinorrhea and sore throat. Eyes: Negative for pain and visual disturbance. Respiratory: Negative for cough, chest tightness, shortness of breath and wheezing. Cardiovascular: Negative for chest pain and palpitations. Gastrointestinal: Negative for abdominal pain, blood in stool, constipation, diarrhea, nausea and vomiting. Genitourinary: Negative for difficulty urinating, frequency, hematuria and urgency. Musculoskeletal: Negative for back pain, joint swelling, myalgias and neck pain. Skin: Negative for rash. Neurological: Negative for dizziness and headaches. Hematological: Negative for adenopathy. Does not bruise/bleed easily. Psychiatric/Behavioral: Negative for behavioral problems and sleep disturbance. The patient is not nervous/anxious. Objective:   Physical Exam   Constitutional: She is oriented to person, place, and time. She appears well-developed and well-nourished. HENT:   Head: Normocephalic and atraumatic. Right Ear: External ear normal.   Left Ear: External ear normal.   Nose: Nose normal.   Mouth/Throat: Oropharynx is clear and moist.   Eyes: Pupils are equal, round, and reactive to light. EOM are normal.   Neck: Neck supple. Carotid bruit is not present. No thyromegaly present. Cardiovascular: Normal rate, regular rhythm and normal heart sounds. Pulmonary/Chest: Breath sounds normal. She has no wheezes. She has no rales. Abdominal: Soft. Bowel sounds are normal. There is no tenderness. There is no rebound and no guarding.    Musculoskeletal: Normal range of motion. She exhibits no edema. No open areas on the feet. Sensation intact. Lymphadenopathy:     She has no cervical adenopathy. Neurological: She is alert and oriented to person, place, and time. She has normal reflexes. No cranial nerve deficit. Skin: Skin is warm and dry. No rash noted. Psychiatric: She has a normal mood and affect. Nursing note and vitals reviewed.     Health Maintenance   Topic Date Due    DTaP/Tdap/Td vaccine (1 - Tdap) 06/06/1945    Shingles Vaccine (1 of 2) 06/06/1976    Potassium monitoring  04/11/2019    Creatinine monitoring  04/11/2019    Flu vaccine  Completed    Pneumococcal 65+ years Vaccine  Completed     Lab Results   Component Value Date    CHOL 170 04/11/2018    CHOL 164 04/10/2017    CHOL 156 04/07/2016     Lab Results   Component Value Date    TRIG 71 04/11/2018    TRIG 70 04/10/2017    TRIG 64 04/07/2016     Lab Results   Component Value Date    HDL 63 04/11/2018    HDL 65 (H) 04/10/2017    HDL 65 (H) 04/07/2016     Lab Results   Component Value Date    LDLCALC 93 04/11/2018    LDLCALC 85 04/10/2017    LDLCALC 78 04/07/2016     Lab Results   Component Value Date    LABVLDL 14 04/11/2018    LABVLDL 14 04/10/2017    LABVLDL 13 04/07/2016    VLDL 13 04/02/2014    VLDL 1.6 04/03/2013    VLDL 15 04/09/2012     Lab Results   Component Value Date    CHOLHDLRATIO 2.7 04/11/2018    CHOLHDLRATIO 2.5 04/10/2017    CHOLHDLRATIO 2.4 04/07/2016     Lab Results   Component Value Date     04/11/2018    K 4.9 04/11/2018    CL 93 (L) 04/11/2018    CO2 29 04/11/2018    BUN 15 04/11/2018    CREATININE 0.7 04/11/2018    GLUCOSE 182 (H) 04/11/2018    CALCIUM 9.8 04/11/2018    PROT 7.7 04/11/2018    LABALBU 4.5 04/11/2018    BILITOT 0.4 04/11/2018    ALKPHOS 63 04/11/2018    AST 18 04/11/2018    ALT 11 04/11/2018       Lab Results   Component Value Date    LABA1C 7.3 01/10/2019     No results found for: EAG    Assessment:      DM 2 metFORMIN (GLUCOPHAGE-XR) 500 MG extended release tablet Take 3 tabs with supper Yes Dariel Riley MD   aspirin (ECOTRIN) 325 MG EC tablet Take 1 tablet by mouth daily Yes Dariel Riley MD   multivitamin [facility]) per tablet Take 1 tablet by mouth daily. Yes Historical Provider, MD   Naproxen Sodium (ALEVE PO) Take  by mouth daily as needed. Yes Historical Provider, MD   Loratadine (CLARITIN PO) Take  by mouth as needed. 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 Family History   Problem Relation Age of Onset    Stroke Mother        CareTeam (Including outside providers/suppliers regularly involved in providing care):   Patient Care Team:  Dariel Riley MD as PCP - General (Family Medicine)  Dariel Riley MD as PCP - [facility] Attributed Provider    Wt Readings from Last 3 Encounters:   04/10/19 112 lb 12.8 oz (51.2 kg)   04/11/18 105 lb 6.4 oz (47.8 kg)   10/16/17 104 lb 9.6 oz (47.4 kg)     Vitals:    04/10/19 0919   BP: 136/70   Pulse: 84   Resp: 16   Weight: 112 lb 12.8 oz (51.2 kg)   Height: 5' 1.6\" (1.565 m)     Body mass index is 20.9 kg/m². Based upon direct observation of the patient, evaluation of cognition reveals remote memory intact, recent memory impaired. Patient's complete Health Risk Assessment and screening values have been reviewed and are found in Flowsheets. The following problems were reviewed today and where indicated follow up appointments were made and/or referrals ordered. Positive Risk Factor Screenings with Interventions:     Cognitive:   Words recalled: 1 Word Recalled  Clock Drawing Test (CDT) Score: (!) Abnormal  Total Score Interpretation: Positive Mini-Cog  Did the patient refuse to take the cognition test?: No  Cognitive Impairment Interventions:  · Patient declines any further evaluation/treatment for cognitive impairment    General Health:  General  In general, how would you say your health is?: Fair  In the past 7 days, have you experienced any of the following? New or Increased Pain, New or Increased Fatigue, Loneliness, Social Isolation, Stress or Anger?: (!) Stress  Do you get the social and emotional support that you need?: Yes  Do you have a Living Will?: (!) No  General Health Risk Interventions:  · Stress: patient declines any further evaluation/treatment for this issue  · No Living Will: provided the state-specific advance directive document to the patient    Health Habits/Nutrition:  Health Habits/Nutrition  Do you exercise for at least 20 minutes 2-3 times per week?: (!) No  Have you lost any weight without trying in the past 3 months?: No  Do you eat fewer than 2 meals per day?: No  Have you seen a dentist within the past year?: (!) No  Body mass index is 20.9 kg/m². Health Habits/Nutrition Interventions:  · Inadequate physical activity:  patient is not ready to increase his/her physical activity level at this time  · Dental exam overdue:  patient encouraged to make appointment with his/her dentist    Hearing/Vision:  Hearing/Vision  Do you or your family notice any trouble with your hearing?: No  Do you have difficulty driving, watching TV, or doing any of your daily activities because of your eyesight?: (!) Yes  Have you had an eye exam within the past year?: (!) No  Hearing/Vision Interventions:  · Vision concerns:  patient encouraged to make appointment with his/her eye specialist    ADL:  ADLs  In the past 7 days, did you need help from others to perform any of the following everyday activities? Eating, dressing, grooming, bathing, toileting, or walking/balance?: None  In the past 7 days, did you need help from others to take care of any of the following?  Laundry, housekeeping, banking/finances, shopping, telephone use, food preparation, transportation, or taking medications?: (!) Transportation  ADL Interventions:  · Patient declines any further evaluation/treatment for this issue    Personalized Preventive Plan   Current Health Maintenance Status  Immunization History   Administered Date(s) Administered    Influenza Whole 10/07/2006    Influenza, Quadv, 3 Years and older, IM (Fluzone 3 yrs and older or Afluria 5 yrs and older) 01/31/2018, 10/12/2018    Pneumococcal 13-valent Conjugate ([id number]) 04/07/2016    Pneumococcal Polysaccharide ([id number]) 10/07/2001        Health Maintenance   Topic Date Due    DTaP/Tdap/Td vaccine (1 - Tdap) 06[birth year]06/1945    Shingles Vaccine (1 of 2) 06/06/1976    Potassium monitoring  04/11/2019    Creatinine monitoring  04/11/2019    Flu vaccine  Completed    Pneumococcal 65+ years Vaccine  Completed     Recommendations for Preventive Services Due: see orders and patient instructions/AVS.  . Recommended screening schedule for the next 5-10 years is provided to the patient in written form: see Patient Instructions/AVS.        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 Continue current treatment plan and follow up at least yearly. Visit Dx:  E11.29, R80.9 - Type 2 diabetes mellitus with microalbuminuria, without long-term current use of insulin ([facility]CC)  Assessment and plan:  Stable based upon symptoms and exam. Continue current treatment plan and follow up at least yearly.   Last edited 04/10/19 09:38 EDT by Cassy Michael MD

## 2019-04-10 NOTE — PATIENT INSTRUCTIONS
Personalized Preventive Plan for Bill Hernandez - 4/10/2019  Medicare offers a range of preventive health benefits. Some of the tests and screenings are paid in full while other may be subject to a deductible, co-insurance, and/or copay. Some of these benefits include a comprehensive review of your medical history including lifestyle, illnesses that may run in your family, and various assessments and screenings as appropriate. After reviewing your medical record and screening and assessments performed today your provider may have ordered immunizations, labs, imaging, and/or referrals for you. A list of these orders (if applicable) as well as your Preventive Care list are included within your After Visit Summary for your review. Other Preventive Recommendations:    · A preventive eye exam performed by an eye specialist is recommended every 1-2 years to screen for glaucoma; cataracts, macular degeneration, and other eye disorders. · A preventive dental visit is recommended every 6 months. · Try to get at least 150 minutes of exercise per week or 10,000 steps per day on a pedometer . · Order or download the FREE \"Exercise & Physical Activity: Your Everyday Guide\" from [employer]. Call 5-348.115.3095 or search [employer] online. · You need 1100-7899 mg of calcium and 8131-5571 IU of vitamin D per day. It is possible to meet your calcium requirement with diet alone, but a vitamin D supplement is usually necessary to meet this goal.  · When exposed to the sun, use a sunscreen that protects against both UVA and UVB radiation with an SPF of 30 or greater. Reapply every 2 to 3 hours or after sweating, drying off with a towel, or swimming. · Always wear a seat belt when traveling in a car. Always wear a helmet when riding a bicycle or motorcycle.   Patient Education        Type 2 Diabetes: Care Instructions  Your Care Instructions    Type 2 diabetes is a disease that develops when the body's tissues cannot use insulin properly. Over time, the pancreas cannot make enough insulin. Insulin is a hormone that helps the body's cells use sugar (glucose) for energy. It also helps the body store extra sugar in muscle, fat, and liver cells. Without insulin, the sugar cannot get into the cells to do its work. It stays in the blood instead. This can cause high blood sugar levels. A person has diabetes when the blood sugar stays too high too much of the time. Over time, diabetes can lead to diseases of the heart, blood vessels, nerves, kidneys, and eyes. You may be able to control your blood sugar by losing weight, eating a healthy diet, and getting daily exercise. You may also have to take insulin or other diabetes medicine. Follow-up care is a key part of your treatment and safety. Be sure to make and go to all appointments. Call your doctor if you are having problems. It's also a good idea to know your test results and keep a list of the medicines you take. How can you care for yourself at home? Keep your blood sugar at a target level (which you set with your doctor). Eat a good diet that spreads carbohydrate throughout the day. Carbohydrate--the body's main source of fuel--affects blood sugar more than any other nutrient. Carbohydrate is in fruits, vegetables, milk, and yogurt. It also is in breads, cereals, vegetables such as potatoes and corn, and sugary foods such as candy and cakes. Aim for 30 minutes of exercise on most, preferably all, days of the week. Walking is a good choice. You also may want to do other activities, such as running, swimming, cycling, or playing tennis or team sports. If your doctor says it's okay, do muscle-strengthening exercises at least 2 times a week. Take your medicines exactly as prescribed. Call your doctor if you think you are having a problem with your medicine. You will get more details on the specific medicines your doctor prescribes.   Check your blood sugar as often as your doctor recommends. It is important to keep track of any symptoms you have, such as low blood sugar. Also tell your doctor if you have any changes in your activities, diet, or insulin use. Talk to your doctor before you start taking aspirin every day. Aspirin can help certain people lower their risk of a heart attack or stroke. But taking aspirin isn't right for everyone, because it can cause serious bleeding. Do not smoke. If you need help quitting, talk to your doctor about stop-smoking programs and medicines. These can increase your chances of quitting for good. Keep your cholesterol and blood pressure at normal levels. You may need to take one or more medicines to reach your goals. Take them exactly as directed. Do not stop or change a medicine without talking to your doctor first.  When should you call for help? Call 911 anytime you think you may need emergency care. For example, call if:    You passed out (lost consciousness), or you suddenly become very sleepy or confused. (You may have very low blood sugar.)    Call your doctor now or seek immediate medical care if:    Your blood sugar is 300 mg/dL or is higher than the level your doctor has set for you.     You have symptoms of low blood sugar, such as:  Sweating. Feeling nervous, shaky, and weak. Extreme hunger and slight nausea. Dizziness and headache. Blurred vision. Confusion.    Watch closely for changes in your health, and be sure to contact your doctor if:    You often have problems controlling your blood sugar.     You have symptoms of long-term diabetes problems, such as:  New vision changes. New pain, numbness, or tingling in your hands or feet. Skin problems. Where can you learn more? Go to https://yessenia.foc.us. org and sign in to your Hop Skip Connect account. Enter C553 in the Reds10 box to learn more about \"Type 2 Diabetes: Care Instructions. \"     If you do not have an account, please click on the \"Sign Up Now\" link. Current as of: July 25, 2018  Content Version: 11.9  © 1623-7584 [employer]. Care instructions adapted under license by [facility] ([facility]). If you have questions about a medical condition or this instruction, always ask your healthcare professional. [address] any warranty or liability for your use of this information. Patient Education        High Cholesterol: Care Instructions  Your Care Instructions    Cholesterol is a type of fat in your blood. It is needed for many body functions, such as making new cells. Cholesterol is made by your body. It also comes from food you eat. High cholesterol means that you have too much of the fat in your blood. This raises your risk of a heart attack and stroke. LDL and HDL are part of your total cholesterol. LDL is the \"bad\" cholesterol. High LDL can raise your risk for heart disease, heart attack, and stroke. HDL is the \"good\" cholesterol. It helps clear bad cholesterol from the body. High HDL is linked with a lower risk of heart disease, heart attack, and stroke. Your cholesterol levels help your doctor find out your risk for having a heart attack or stroke. You and your doctor can talk about whether you need to lower your risk and what treatment is best for you. A heart-healthy lifestyle along with medicines can help lower your cholesterol and your risk. The way you choose to lower your risk will depend on how high your risk is for heart attack and stroke. It will also depend on how you feel about taking medicines. Follow-up care is a key part of your treatment and safety. Be sure to make and go to all appointments, and call your doctor if you are having problems. It's also a good idea to know your test results and keep a list of the medicines you take. How can you care for yourself at home? Eat a variety of foods every day.  Good choices include fruits, vegetables, whole grains health and may have suggested ways to take good care of yourself. He or she also may have recommended tests. At home, you can help prevent illness with healthy eating, regular exercise, and other steps. Follow-up care is a key part of your treatment and safety. Be sure to make and go to all appointments, and call your doctor if you are having problems. It's also a good idea to know your test results and keep a list of the medicines you take. How can you care for yourself at home? Reach and stay at a healthy weight. This will lower your risk for many problems, such as obesity, diabetes, heart disease, and high blood pressure. Get at least 30 minutes of exercise on most days of the week. Walking is a good choice. You also may want to do other activities, such as running, swimming, cycling, or playing tennis or team sports. Do not smoke. Smoking can make health problems worse. If you need help quitting, talk to your doctor about stop-smoking programs and medicines. These can increase your chances of quitting for good. Protect your skin from too much sun. When you're outdoors from 10 a.m. to 4 p.m., stay in the shade or cover up with clothing and a hat with a wide brim. Wear sunglasses that block UV rays. Even when it's cloudy, put broad-spectrum sunscreen (SPF 30 or higher) on any exposed skin. See a dentist one or two times a year for checkups and to have your teeth cleaned. Wear a seat belt in the car. Limit alcohol to 2 drinks a day for men and 1 drink a day for women. Too much alcohol can cause health problems. Follow your doctor's advice about when to have certain tests. These tests can spot problems early. For men and women  Cholesterol. Your doctor will tell you how often to have this done based on your overall health and other things that can increase your risk for heart attack and stroke. Blood pressure. Have your blood pressure checked during a routine doctor visit.  Your doctor will tell you how often to check your blood pressure based on your age, your blood pressure results, and other factors. Diabetes. Ask your doctor whether you should have tests for diabetes. Vision. Experts recommend that you have yearly exams for glaucoma and other age-related eye problems. Hearing. Tell your doctor if you notice any change in your hearing. You can have tests to find out how well you hear. Colon cancer tests. Keep having colon cancer tests as your doctor recommends. You can have one of several types of tests. Heart attack and stroke risk. At least every 4 to 6 years, you should have your risk for heart attack and stroke assessed. Your doctor uses factors such as your age, blood pressure, cholesterol, and whether you smoke or have diabetes to show what your risk for a heart attack or stroke is over the next 10 years. Osteoporosis. Talk to your doctor about whether you should have a bone density test to find out whether you have thinning bones. Also ask your doctor about whether you should take calcium and vitamin D supplements. For women  Pap test and pelvic exam. You may no longer need a Pap test. Talk with your doctor about whether to stop or continue to have Pap tests. Breast exam and mammogram. Ask how often you should have a mammogram, which is an X-ray of your breasts. A mammogram can spot breast cancer before it can be felt and when it is easiest to treat. Thyroid disease. Talk to your doctor about whether to have your thyroid checked as part of a regular physical exam. Women have an increased chance of a thyroid problem. For men  Prostate exam. Talk to your doctor about whether you should have a blood test (called a PSA test) for prostate cancer. Experts disagree on whether men should have this test. Some experts recommend that you discuss the benefits and risks of the test with your doctor. Abdominal aortic aneurysm. Ask your doctor whether you should have a test to check for an aneurysm.  You may need a test if you ever smoked or if your parent, brother, sister, or child has had an aneurysm. When should you call for help? Watch closely for changes in your health, and be sure to contact your doctor if you have any problems or symptoms that concern you. Where can you learn more? Go to https://chpepiceweb.IndiaMART. org and sign in to your SpineThera account. Enter N336 in the [facility] box to learn more about \"Well Visit, Over 65: Care Instructions. \"     If you do not have an account, please click on the \"Sign Up Now\" link. Current as of: March 28, 2018  Content Version: 11.9  © 6556-2557 [employer]. Care instructions adapted under license by [facility] ([facility]). If you have questions about a medical condition or this instruction, always ask your healthcare professional. [place] [address] any warranty or liability for your use of this information. Patient Education        Learning About How to Make a Home Safe  Making your home safe: Overview  You can help protect the person in your care by making the home safe. Here are some general tips for how to lower the chance of getting injured in the home. Pad sharp corners on furniture and counter tops. Keep objects that are used often within easy reach. Install handrails around the toilet and in the shower. Use a tub mat to prevent slipping. Use a shower chair or bath bench when the person bathes. Provide good lighting inside and outside the home. Put night-lights in bedrooms, hallways, and bathrooms. Have light at the top and bottom of stairways. Have a first aid kit. It is also important to be aware of safe temperatures in the home. When helping someone bathe, use the back of your hand to test the water to make sure it's not too hot. Lower the temperature setting in the hot water heater to 120°F or lower to avoid burns. And make sure other liquids (such as coffee, tea, or soup) are not too hot.   How can and other dangerous items. Use hidden switches or controls for the stove, thermostat, water heater, and other appliances. If your loved one is still cooking, think about whether that is safe. It may be okay with some help, depending on your loved one's condition. But for people who have memory or thinking problems, it's best to avoid any activities that might not be safe. If the person tends to wander or to try to leave the home, install motion-sensor lights on all doors and windows. Have emergency numbers in a central area near a phone. Include 911 and numbers for the doctor and family members. Get medical alert jewelry for the person so you can be contacted if he or she wanders away. If possible, provide a safe place for wandering, such as an enclosed yard or garden. Where can you learn more? Go to https://Famigopepiceweb.Basic-Fit. org and sign in to your Keisense account. Enter W341 in the OnTheRoad box to learn more about \"Learning About How to Make a Home Safe. \"     If you do not have an account, please click on the \"Sign Up Now\" link. Current as of: April 18, 2018  Content Version: 11.9  © 8565-3502 [employer]. Care instructions adapted under license by [facility] ([facility]). If you have questions about a medical condition or this instruction, always ask your healthcare professional. [place] [address] any warranty or liability for your use of this information. Patient Education        Learning About Living Evy Kam  What is a living will? A living will is a legal form you use to write down the kind of care you want at the end of your life. It is used by the health professionals who will treat you if you aren't able to decide for yourself. If you put your wishes in writing, your loved ones and others will know what kind of care you want. They won't need to guess. This can ease your mind and be helpful to others.   A living will is not the same as an estate way the people making decisions for you won't be surprised by your choices. Think about these questions as you make your living will:  Do you know enough about life support methods that might be used? If not, talk to your doctor so you know what might be done if you can't breathe on your own, your heart stops, or you're unable to swallow. What things would you still want to be able to do after you receive life-support methods? Would you want to be able to walk? To speak? To eat on your own? To live without the help of machines? If you have a choice, where do you want to be cared for? In your home? At a hospital or nursing home? Do you want certain [employer]ism practices performed if you become very ill? If you have a choice at the end of your life, where would you prefer to die? At home? In a hospital or nursing home? Somewhere else? Would you prefer to be buried or cremated? Do you want your organs to be donated after you die? What should you do with your living will? Make sure that your family members and your health care agent have copies of your living will. Give your doctor a copy of your living will to keep in your medical record. If you have more than one doctor, make sure that each one has a copy. You may want to put a copy of your living will where it can be easily found. Where can you learn more? Go to https://revoPTpepiceweb.Foodyn. org and sign in to your Ibotta account. Enter Z989 in the [facility] box to learn more about \"Learning About Living Sherrell Chan. \"     If you do not have an account, please click on the \"Sign Up Now\" link. Current as of: April 18, 2018  Content Version: 11.9  © 8010-2410 [employer]. Care instructions adapted under license by [facility] ([facility]).  If you have questions about a medical condition or this instruction, always ask your healthcare professional. [address] any warranty or liability for your use of this information.

## 2019-04-10 NOTE — PROGRESS NOTES
Blood work drawn today in the office, venous puncture, left arm, pt tolerated well.   Urine sample collected for micral.

## 2019-04-11 ENCOUNTER — TELEPHONE (OUTPATIENT)
Dept: FAMILY MEDICINE CLINIC | Age: 84
End: 2019-04-11

## 2019-04-11 DIAGNOSIS — E11.29 TYPE 2 DIABETES MELLITUS WITH MICROALBUMINURIA, WITHOUT LONG-TERM CURRENT USE OF INSULIN (HCC): Primary | ICD-10-CM

## 2019-04-11 DIAGNOSIS — R80.9 TYPE 2 DIABETES MELLITUS WITH MICROALBUMINURIA, WITHOUT LONG-TERM CURRENT USE OF INSULIN (HCC): Primary | ICD-10-CM

## 2019-04-11 NOTE — TELEPHONE ENCOUNTER
Pt's son, Zen Bautista notified of results. Continue present, recheck a1c in 6 months. A1c mailed to pt.

## 2019-04-11 NOTE — TELEPHONE ENCOUNTER
----- Message from Martin Walton MD sent at 4/11/2019  6:35 AM EDT -----  A1C is stable at 7.4. Recheck a1c in 6 months. Cholesterol at 150 with normal CRR. CMP is good, glucose at 115. CBC is stable. Overall labs are good, continue present,.

## 2019-10-11 ENCOUNTER — NURSE ONLY (OUTPATIENT)
Dept: FAMILY MEDICINE CLINIC | Age: 84
End: 2019-10-11
Payer: MEDICARE

## 2019-10-11 DIAGNOSIS — R80.9 TYPE 2 DIABETES MELLITUS WITH MICROALBUMINURIA, WITHOUT LONG-TERM CURRENT USE OF INSULIN (HCC): Primary | ICD-10-CM

## 2019-10-11 DIAGNOSIS — Z23 NEED FOR INFLUENZA VACCINATION: ICD-10-CM

## 2019-10-11 DIAGNOSIS — E11.29 TYPE 2 DIABETES MELLITUS WITH MICROALBUMINURIA, WITHOUT LONG-TERM CURRENT USE OF INSULIN (HCC): Primary | ICD-10-CM

## 2019-10-11 PROCEDURE — 83036 HEMOGLOBIN GLYCOSYLATED A1C: CPT | Performed by: FAMILY MEDICINE

## 2019-10-11 PROCEDURE — 90653 IIV ADJUVANT VACCINE IM: CPT | Performed by: FAMILY MEDICINE

## 2019-10-11 PROCEDURE — G0008 ADMIN INFLUENZA VIRUS VAC: HCPCS | Performed by: FAMILY MEDICINE

## 2020-04-01 RX ORDER — LANCETS 28 GAUGE
1 EACH MISCELLANEOUS DAILY
Qty: 100 EACH | Refills: 0 | Status: SHIPPED | OUTPATIENT
Start: 2020-04-01 | End: 2020-06-10 | Stop reason: SDUPTHER

## 2020-04-01 RX ORDER — LISINOPRIL 30 MG/1
30 TABLET ORAL DAILY
Qty: 90 TABLET | Refills: 0 | Status: SHIPPED | OUTPATIENT
Start: 2020-04-01 | End: 2020-06-10 | Stop reason: SDUPTHER

## 2020-04-01 RX ORDER — BLOOD-GLUCOSE METER
1 KIT MISCELLANEOUS DAILY
Qty: 100 EACH | Refills: 0 | Status: SHIPPED | OUTPATIENT
Start: 2020-04-01 | End: 2020-06-10 | Stop reason: SDUPTHER

## 2020-04-01 RX ORDER — PRAVASTATIN SODIUM 40 MG
40 TABLET ORAL EVERY EVENING
Qty: 90 TABLET | Refills: 0 | Status: SHIPPED | OUTPATIENT
Start: 2020-04-01 | End: 2020-06-10 | Stop reason: SDUPTHER

## 2020-04-01 RX ORDER — GLIMEPIRIDE 2 MG/1
2 TABLET ORAL
Qty: 90 TABLET | Refills: 0 | Status: SHIPPED | OUTPATIENT
Start: 2020-04-01 | End: 2020-06-10 | Stop reason: SDUPTHER

## 2020-04-01 RX ORDER — METFORMIN HYDROCHLORIDE 500 MG/1
TABLET, EXTENDED RELEASE ORAL
Qty: 270 TABLET | Refills: 0 | Status: SHIPPED | OUTPATIENT
Start: 2020-04-01 | End: 2020-06-10 | Stop reason: SDUPTHER

## 2020-06-10 ENCOUNTER — OFFICE VISIT (OUTPATIENT)
Dept: FAMILY MEDICINE CLINIC | Age: 85
End: 2020-06-10
Payer: MEDICARE

## 2020-06-10 VITALS
DIASTOLIC BLOOD PRESSURE: 78 MMHG | HEIGHT: 62 IN | SYSTOLIC BLOOD PRESSURE: 134 MMHG | RESPIRATION RATE: 16 BRPM | HEART RATE: 80 BPM | WEIGHT: 110.2 LBS | BODY MASS INDEX: 20.28 KG/M2 | TEMPERATURE: 98.6 F

## 2020-06-10 LAB
ALBUMIN SERPL-MCNC: 4.3 G/DL (ref 3.5–5.1)
ALBUMIN SERPL-MCNC: NORMAL G/DL (ref 3.5–5.1)
ALP BLD-CCNC: 55 U/L (ref 38–126)
ALP BLD-CCNC: NORMAL U/L (ref 38–126)
ALT SERPL-CCNC: 8 U/L (ref 11–66)
ALT SERPL-CCNC: NORMAL U/L (ref 11–66)
ANION GAP SERPL CALCULATED.3IONS-SCNC: 13 MEQ/L (ref 8–16)
ANION GAP SERPL CALCULATED.3IONS-SCNC: NORMAL MEQ/L (ref 8–16)
AST SERPL-CCNC: 21 U/L (ref 5–40)
AST SERPL-CCNC: NORMAL U/L (ref 5–40)
AVERAGE GLUCOSE: 153 MG/DL (ref 70–126)
BASOPHILS # BLD: 0.5 %
BASOPHILS ABSOLUTE: 0 THOU/MM3 (ref 0–0.1)
BILIRUB SERPL-MCNC: 0.4 MG/DL (ref 0.3–1.2)
BILIRUB SERPL-MCNC: NORMAL MG/DL (ref 0.3–1.2)
BUN BLDV-MCNC: 16 MG/DL (ref 7–22)
BUN BLDV-MCNC: NORMAL MG/DL (ref 7–22)
CALCIUM SERPL-MCNC: 10 MG/DL (ref 8.5–10.5)
CALCIUM SERPL-MCNC: NORMAL MG/DL (ref 8.5–10.5)
CHLORIDE BLD-SCNC: 101 MEQ/L (ref 98–111)
CHLORIDE BLD-SCNC: NORMAL MEQ/L (ref 98–111)
CHOLESTEROL, TOTAL: 156 MG/DL (ref 100–199)
CHOLESTEROL, TOTAL: NORMAL MG/DL (ref 100–199)
CO2: 22 MEQ/L (ref 23–33)
CO2: NORMAL MEQ/L (ref 23–33)
CREAT SERPL-MCNC: 0.7 MG/DL (ref 0.4–1.2)
CREAT SERPL-MCNC: NORMAL MG/DL (ref 0.4–1.2)
EOSINOPHIL # BLD: 0.2 %
EOSINOPHILS ABSOLUTE: 0 THOU/MM3 (ref 0–0.4)
ERYTHROCYTE [DISTWIDTH] IN BLOOD BY AUTOMATED COUNT: 12.5 % (ref 11.5–14.5)
ERYTHROCYTE [DISTWIDTH] IN BLOOD BY AUTOMATED COUNT: 45.9 FL (ref 35–45)
GFR SERPL CREATININE-BSD FRML MDRD: 78 ML/MIN/1.73M2
GFR SERPL CREATININE-BSD FRML MDRD: NORMAL ML/MIN/1.73M2
GLUCOSE BLD-MCNC: 137 MG/DL (ref 70–108)
GLUCOSE BLD-MCNC: NORMAL MG/DL (ref 70–108)
HBA1C MFR BLD: 7.1 % (ref 4.4–6.4)
HCT VFR BLD CALC: 35.4 % (ref 37–47)
HDLC SERPL-MCNC: 54 MG/DL
HDLC SERPL-MCNC: NORMAL MG/DL
HEMOGLOBIN: 11.4 GM/DL (ref 12–16)
IMMATURE GRANS (ABS): 0.02 THOU/MM3 (ref 0–0.07)
IMMATURE GRANULOCYTES: 0.3 %
LDL CHOLESTEROL CALCULATED: 89 MG/DL
LDL CHOLESTEROL CALCULATED: NORMAL MG/DL
LYMPHOCYTES # BLD: 16.8 %
LYMPHOCYTES ABSOLUTE: 1 THOU/MM3 (ref 1–4.8)
MCH RBC QN AUTO: 31.9 PG (ref 26–33)
MCHC RBC AUTO-ENTMCNC: 32.2 GM/DL (ref 32.2–35.5)
MCV RBC AUTO: 99.2 FL (ref 81–99)
MONOCYTES # BLD: 7.4 %
MONOCYTES ABSOLUTE: 0.5 THOU/MM3 (ref 0.4–1.3)
NUCLEATED RED BLOOD CELLS: 0 /100 WBC
PLATELET # BLD: 203 THOU/MM3 (ref 130–400)
PMV BLD AUTO: 11.6 FL (ref 9.4–12.4)
POTASSIUM SERPL-SCNC: 4.6 MEQ/L (ref 3.5–5.2)
POTASSIUM SERPL-SCNC: NORMAL MEQ/L (ref 3.5–5.2)
RBC # BLD: 3.57 MILL/MM3 (ref 4.2–5.4)
SEG NEUTROPHILS: 74.8 %
SEGMENTED NEUTROPHILS ABSOLUTE COUNT: 4.6 THOU/MM3 (ref 1.8–7.7)
SODIUM BLD-SCNC: 136 MEQ/L (ref 135–145)
SODIUM BLD-SCNC: NORMAL MEQ/L (ref 135–145)
TOTAL PROTEIN: 7.4 G/DL (ref 6.1–8)
TOTAL PROTEIN: NORMAL G/DL (ref 6.1–8)
TRIGL SERPL-MCNC: 65 MG/DL (ref 0–199)
TRIGL SERPL-MCNC: NORMAL MG/DL (ref 0–199)
WBC # BLD: 6.1 THOU/MM3 (ref 4.8–10.8)

## 2020-06-10 PROCEDURE — 82044 UR ALBUMIN SEMIQUANTITATIVE: CPT | Performed by: FAMILY MEDICINE

## 2020-06-10 PROCEDURE — 1123F ACP DISCUSS/DSCN MKR DOCD: CPT | Performed by: FAMILY MEDICINE

## 2020-06-10 PROCEDURE — 4040F PNEUMOC VAC/ADMIN/RCVD: CPT | Performed by: FAMILY MEDICINE

## 2020-06-10 PROCEDURE — G0439 PPPS, SUBSEQ VISIT: HCPCS | Performed by: FAMILY MEDICINE

## 2020-06-10 RX ORDER — LANCETS 28 GAUGE
1 EACH MISCELLANEOUS DAILY
Qty: 100 EACH | Refills: 3 | Status: SHIPPED | OUTPATIENT
Start: 2020-06-10 | End: 2021-06-14 | Stop reason: SDUPTHER

## 2020-06-10 RX ORDER — LISINOPRIL 30 MG/1
30 TABLET ORAL DAILY
Qty: 90 TABLET | Refills: 3 | Status: SHIPPED | OUTPATIENT
Start: 2020-06-10 | End: 2021-06-14 | Stop reason: SDUPTHER

## 2020-06-10 RX ORDER — METFORMIN HYDROCHLORIDE 500 MG/1
TABLET, EXTENDED RELEASE ORAL
Qty: 270 TABLET | Refills: 3 | Status: SHIPPED | OUTPATIENT
Start: 2020-06-10 | End: 2020-12-21 | Stop reason: SDUPTHER

## 2020-06-10 RX ORDER — GLIMEPIRIDE 2 MG/1
2 TABLET ORAL
Qty: 90 TABLET | Refills: 3 | Status: SHIPPED | OUTPATIENT
Start: 2020-06-10 | End: 2021-06-14 | Stop reason: SDUPTHER

## 2020-06-10 RX ORDER — PRAVASTATIN SODIUM 40 MG
40 TABLET ORAL EVERY EVENING
Qty: 90 TABLET | Refills: 3 | Status: SHIPPED | OUTPATIENT
Start: 2020-06-10 | End: 2021-06-14 | Stop reason: SDUPTHER

## 2020-06-10 RX ORDER — BLOOD-GLUCOSE METER
1 KIT MISCELLANEOUS DAILY
Qty: 100 EACH | Refills: 3 | Status: SHIPPED | OUTPATIENT
Start: 2020-06-10 | End: 2021-06-14 | Stop reason: SDUPTHER

## 2020-06-10 SDOH — ECONOMIC STABILITY: INCOME INSECURITY: HOW HARD IS IT FOR YOU TO PAY FOR THE VERY BASICS LIKE FOOD, HOUSING, MEDICAL CARE, AND HEATING?: NOT HARD AT ALL

## 2020-06-10 SDOH — ECONOMIC STABILITY: FOOD INSECURITY: WITHIN THE PAST 12 MONTHS, YOU WORRIED THAT YOUR FOOD WOULD RUN OUT BEFORE YOU GOT MONEY TO BUY MORE.: NEVER TRUE

## 2020-06-10 SDOH — ECONOMIC STABILITY: TRANSPORTATION INSECURITY
IN THE PAST 12 MONTHS, HAS LACK OF TRANSPORTATION KEPT YOU FROM MEETINGS, WORK, OR FROM GETTING THINGS NEEDED FOR DAILY LIVING?: NO

## 2020-06-10 SDOH — ECONOMIC STABILITY: TRANSPORTATION INSECURITY
IN THE PAST 12 MONTHS, HAS THE LACK OF TRANSPORTATION KEPT YOU FROM MEDICAL APPOINTMENTS OR FROM GETTING MEDICATIONS?: NO

## 2020-06-10 SDOH — ECONOMIC STABILITY: FOOD INSECURITY: WITHIN THE PAST 12 MONTHS, THE FOOD YOU BOUGHT JUST DIDN'T LAST AND YOU DIDN'T HAVE MONEY TO GET MORE.: NEVER TRUE

## 2020-06-10 ASSESSMENT — ENCOUNTER SYMPTOMS
SHORTNESS OF BREATH: 0
BLOOD IN STOOL: 0
EYE PAIN: 0
CONSTIPATION: 0
NAUSEA: 0
BACK PAIN: 0
COUGH: 0
RHINORRHEA: 0
CHEST TIGHTNESS: 0
DIARRHEA: 0
SORE THROAT: 0
ABDOMINAL PAIN: 0
WHEEZING: 0
VOMITING: 0

## 2020-06-10 ASSESSMENT — PATIENT HEALTH QUESTIONNAIRE - PHQ9
SUM OF ALL RESPONSES TO PHQ QUESTIONS 1-9: 0
SUM OF ALL RESPONSES TO PHQ QUESTIONS 1-9: 0

## 2020-06-10 ASSESSMENT — LIFESTYLE VARIABLES: HOW OFTEN DO YOU HAVE A DRINK CONTAINING ALCOHOL: 0

## 2020-06-10 NOTE — PATIENT INSTRUCTIONS
develops when the body's tissues cannot use insulin properly. Over time, the pancreas cannot make enough insulin. Insulin is a hormone that helps the body's cells use sugar (glucose) for energy. It also helps the body store extra sugar in muscle, fat, and liver cells. Without insulin, the sugar cannot get into the cells to do its work. It stays in the blood instead. This can cause high blood sugar levels. A person has diabetes when the blood sugar stays too high too much of the time. Over time, diabetes can lead to diseases of the heart, blood vessels, nerves, kidneys, and eyes. You may be able to control your blood sugar by losing weight, eating a healthy diet, and getting daily exercise. You may also have to take insulin or other diabetes medicine. Follow-up care is a key part of your treatment and safety. Be sure to make and go to all appointments. Call your doctor if you are having problems. It's also a good idea to know your test results and keep a list of the medicines you take. How can you care for yourself at home? Keep your blood sugar at a target level (which you set with your doctor). Eat a good diet that spreads carbohydrate throughout the day. Carbohydrate--the body's main source of fuel--affects blood sugar more than any other nutrient. Carbohydrate is in fruits, vegetables, milk, and yogurt. It also is in breads, cereals, vegetables such as potatoes and corn, and sugary foods such as candy and cakes. Aim for 30 minutes of exercise on most, preferably all, days of the week. Walking is a good choice. You also may want to do other activities, such as running, swimming, cycling, or playing tennis or team sports. If your doctor says it's okay, do muscle-strengthening exercises at least 2 times a week. Take your medicines exactly as prescribed. Call your doctor if you think you are having a problem with your medicine. You will get more details on the specific medicines your doctor prescribes.   Check your blood sugar as often as your doctor recommends. It is important to keep track of any symptoms you have, such as low blood sugar. Also tell your doctor if you have any changes in your activities, diet, or insulin use. Talk to your doctor before you start taking aspirin every day. Aspirin can help certain people lower their risk of a heart attack or stroke. But taking aspirin isn't right for everyone, because it can cause serious bleeding. Do not smoke. If you need help quitting, talk to your doctor about stop-smoking programs and medicines. These can increase your chances of quitting for good. Keep your cholesterol and blood pressure at normal levels. You may need to take one or more medicines to reach your goals. Take them exactly as directed. Do not stop or change a medicine without talking to your doctor first.  When should you call for help? [id number] anytime you think you may need emergency care. For example, call if:  You passed out (lost consciousness), or you suddenly become very sleepy or confused. (You may have very low blood sugar.)  Call your doctor now or seek immediate medical care if:  Your blood sugar is 300 mg/dL or is higher than the level your doctor has set for you. You have symptoms of low blood sugar, such as:  Sweating. Feeling nervous, shaky, and weak. Extreme hunger and slight nausea. Dizziness and headache. Blurred vision. Confusion. Watch closely for changes in your health, and be sure to contact your doctor if:  You often have problems controlling your blood sugar. You have symptoms of long-term diabetes problems, such as:  New vision changes. New pain, numbness, or tingling in your hands or feet. Skin problems. Where can you learn more? Go to https://yessenia.Direct Vet Marketing. org and sign in to your Autoparts24 account. Enter C553 in the Jabong.com box to learn more about \"Type 2 Diabetes: Care Instructions. \"     If you do not have an account, please click oatmeal), dried beans and peas, nuts and seeds, soy products (like tofu), and fat-free or low-fat dairy products. Replace butter, margarine, and hydrogenated or partially hydrogenated oils with olive and canola oils. (Canola oil margarine without trans fat is fine.)  Replace red meat with fish, poultry, and soy protein (like tofu). Limit processed and packaged foods like chips, crackers, and cookies. Bake, broil, or steam foods. Don't [place] them. Be physically active. Get at least 30 minutes of exercise on most days of the week. Walking is a good choice. You also may want to do other activities, such as running, swimming, cycling, or playing tennis or team sports. Stay at a healthy weight or lose weight by making the changes in eating and physical activity listed above. Losing just a small amount of weight, even 5 to 10 pounds, can reduce your risk for having a heart attack or stroke. Do not smoke. When should you call for help? Watch closely for changes in your health, and be sure to contact your doctor if:  You need help making lifestyle changes. You have questions about your medicine. Where can you learn more? Go to https://Parastructure.Hippo Manager Software. org and sign in to your WaveDeck account. Enter M447 in the [facility] box to learn more about \"High Cholesterol: Care Instructions. \"     If you do not have an account, please click on the \"Sign Up Now\" link. Current as of: December 16, 2019               Content Version: 12.5  © 4320-8888 Healthwise, Incorporated. Care instructions adapted under license by [facility] ([facility]). If you have questions about a medical condition or this instruction, always ask your healthcare professional. [place] [address] any warranty or liability for your use of this information. Patient Education        Well Visit, Over 72: Care Instructions  Your Care Instructions     Physical exams can help you stay healthy.  Your doctor has checked your overall health and may have suggested ways to take good care of yourself. He or she also may have recommended tests. At home, you can help prevent illness with healthy eating, regular exercise, and other steps. Follow-up care is a key part of your treatment and safety. Be sure to make and go to all appointments, and call your doctor if you are having problems. It's also a good idea to know your test results and keep a list of the medicines you take. How can you care for yourself at home? Reach and stay at a healthy weight. This will lower your risk for many problems, such as obesity, diabetes, heart disease, and high blood pressure. Get at least 30 minutes of exercise on most days of the week. Walking is a good choice. You also may want to do other activities, such as running, swimming, cycling, or playing tennis or team sports. Do not smoke. Smoking can make health problems worse. If you need help quitting, talk to your doctor about stop-smoking programs and medicines. These can increase your chances of quitting for good. Protect your skin from too much sun. When you're outdoors from 10 a.m. to 4 p.m., stay in the shade or cover up with clothing and a hat with a wide brim. Wear sunglasses that block UV rays. Even when it's cloudy, put broad-spectrum sunscreen (SPF 30 or higher) on any exposed skin. See a dentist one or two times a year for checkups and to have your teeth cleaned. Wear a seat belt in the car. Follow your doctor's advice about when to have certain tests. These tests can spot problems early. For men and women  Cholesterol. Your doctor will tell you how often to have this done based on your overall health and other things that can increase your risk for heart attack and stroke. Blood pressure. Have your blood pressure checked during a routine doctor visit.  Your doctor will tell you how often to check your blood pressure based on your age, your blood pressure results, and other factors. Diabetes. Ask your doctor whether you should have tests for diabetes. Vision. Experts recommend that you have yearly exams for glaucoma and other age-related eye problems. Hearing. Tell your doctor if you notice any change in your hearing. You can have tests to find out how well you hear. Colon cancer tests. Keep having colon cancer tests as your doctor recommends. You can have one of several types of tests. Heart attack and stroke risk. At least every 4 to 6 years, you should have your risk for heart attack and stroke assessed. Your doctor uses factors such as your age, blood pressure, cholesterol, and whether you smoke or have diabetes to show what your risk for a heart attack or stroke is over the next 10 years. Osteoporosis. Talk to your doctor about whether you should have a bone density test to find out whether you have thinning bones. Ask your doctor if you need to take a calcium plus vitamin D supplement. You may be able to get enough calcium and vitamin D through your diet. For women  Pap test and pelvic exam. You may no longer need a Pap test. Talk with your doctor about whether to stop or continue to have Pap tests. Breast exam and mammogram. Ask how often you should have a mammogram, which is an X-ray of your breasts. A mammogram can spot breast cancer before it can be felt and when it is easiest to treat. Thyroid disease. Talk to your doctor about whether to have your thyroid checked as part of a regular physical exam. Women have an increased chance of a thyroid problem. For men  Prostate exam. Talk to your doctor about whether you should have a blood test (called a PSA test) for prostate cancer. Experts recommend that you discuss the benefits and risks of the test with your doctor before you decide whether to have this test. Some experts say that men ages 79 and older no longer need testing. Abdominal aortic aneurysm.  Ask your doctor whether you should have a test to check for an posture and can be a great way to relax. Be sure to stretch the muscles you'll be using when you work out. It's best to warm your muscles slightly before you stretch them. Walk or do some other light aerobic activity for a few minutes, and then start stretching. When you stretch your muscles:  Do it slowly. Stretching is not about going fast or making sudden movements. Don't push or bounce during a stretch. Hold each stretch for at least 15 to 30 seconds, if you can. You should feel a stretch in the muscle, but not pain. Breathe out as you do the stretch. Then breathe in as you hold the stretch. Don't hold your breath. If you're worried about how more activity might affect your health, have a checkup before you start. Follow any special advice your doctor gives you for getting a smart start. Where can you learn more? Go to https://Meilelepepicewcarlos.Motion Dispatch. org and sign in to your BeMe Intimates account. Enter D034 in the Lab21 box to learn more about \"Learning About Physical Activity. \"     If you do not have an account, please click on the \"Sign Up Now\" link. Current as of: January 16, 2020               Content Version: 12.5  © 2006-2020 Ecrebo. Care instructions adapted under license by [facility] ([facility]). If you have questions about a medical condition or this instruction, always ask your healthcare professional. [place] [address] any warranty or liability for your use of this information. Patient Education        Learning About Living Beth Mccann  What is a living will? A living will, also called a declaration, is a legal form. It tells your family and your doctor your wishes when you can't speak for yourself. It's used by the health professionals who will treat you as you near the end of your life or if you get seriously hurt or ill. If you put your wishes in writing, your loved ones and others will know what kind of care you want.  They won't need to door. If you are using a digital copy, be sure your doctor, family members, and health care agent know how to find and access it. Where can you learn more? Go to https://VonagepeRepuCare Onsite.Adspired Technologies. org and sign in to your Semafone account. Enter T333 in the Tendyne Holdings box to learn more about \"Learning About Living [place]. \"     If you do not have an account, please click on the \"Sign Up Now\" link. Current as of: December 9, 2019               Content Version: 12.5  © 5953-0327 Healthwise, Incorporated. Care instructions adapted under license by [facility] ([facility]). If you have questions about a medical condition or this instruction, always ask your healthcare professional. [address] any warranty or liability for your use of this information.

## 2020-06-11 ENCOUNTER — TELEPHONE (OUTPATIENT)
Dept: FAMILY MEDICINE CLINIC | Age: 85
End: 2020-06-11

## 2020-06-11 LAB
MICROALB/CREAT RATIO POC: ABNORMAL MG/G
MICROALBUMIN/CREAT UR-RTO: 30 MG/L
POC CREATININE: 50 MG/DL

## 2020-06-16 ENCOUNTER — TELEPHONE (OUTPATIENT)
Dept: FAMILY MEDICINE CLINIC | Age: 85
End: 2020-06-16

## 2020-06-16 NOTE — TELEPHONE ENCOUNTER
----- Message from Tamika Blackburn MD sent at 6/10/2020  8:55 PM EDT -----  Kidney function is stable. CMP is good, glucose at 137. Cholesterol at 156 with normal CRR. A1C is good at 7.1. Recheck a1c in 6 months. .  CBC is stable  . Patients son mckenzie called inquiring about the results of her labs. I told him Alayna's results and told him we made several attempts and no one answered.  I noticed in the chart we had the wrong phone number attached to her demographics fixed it and apologized to patients family

## 2020-10-08 ENCOUNTER — IMMUNIZATION (OUTPATIENT)
Dept: FAMILY MEDICINE CLINIC | Age: 85
End: 2020-10-08
Payer: MEDICARE

## 2020-10-08 PROCEDURE — G0008 ADMIN INFLUENZA VIRUS VAC: HCPCS | Performed by: NURSE PRACTITIONER

## 2020-10-08 PROCEDURE — 90694 VACC AIIV4 NO PRSRV 0.5ML IM: CPT | Performed by: NURSE PRACTITIONER

## 2020-12-09 ENCOUNTER — NURSE ONLY (OUTPATIENT)
Dept: FAMILY MEDICINE CLINIC | Age: 85
End: 2020-12-09
Payer: MEDICARE

## 2020-12-09 ENCOUNTER — TELEPHONE (OUTPATIENT)
Dept: FAMILY MEDICINE CLINIC | Age: 85
End: 2020-12-09

## 2020-12-09 LAB — HBA1C MFR BLD: 6.9 % (ref 4.3–5.7)

## 2020-12-09 PROCEDURE — 83036 HEMOGLOBIN GLYCOSYLATED A1C: CPT | Performed by: FAMILY MEDICINE

## 2020-12-09 NOTE — TELEPHONE ENCOUNTER
----- Message from Marcial Thompson MD sent at 12/9/2020 12:02 PM EST -----  a1c is good at 6.9. Continue present and recheck a1c in 6 months with yearly labs.

## 2020-12-21 ENCOUNTER — TELEPHONE (OUTPATIENT)
Dept: FAMILY MEDICINE CLINIC | Age: 85
End: 2020-12-21

## 2020-12-21 RX ORDER — METFORMIN HYDROCHLORIDE 500 MG/1
TABLET, EXTENDED RELEASE ORAL
Qty: 90 TABLET | Refills: 0 | Status: SHIPPED | OUTPATIENT
Start: 2020-12-21 | End: 2021-06-14 | Stop reason: SDUPTHER

## 2020-12-21 NOTE — TELEPHONE ENCOUNTER
Patient's son Scott Baptiste called. States that her meds are lost in the mail. The only one that she is almost out of is Metformin. Can you send in a short term supply for her. She uses [facility] on [address].  Call him back at [phone] only if any problems

## 2021-01-19 ENCOUNTER — IMMUNIZATION (OUTPATIENT)
Dept: PRIMARY CARE CLINIC | Age: 86
End: 2021-01-19
Payer: MEDICARE

## 2021-01-19 PROCEDURE — 0011A COVID-19, MODERNA VACCINE 100MCG/0.5ML DOSE: CPT | Performed by: FAMILY MEDICINE

## 2021-01-19 PROCEDURE — 91301 COVID-19, MODERNA VACCINE 100MCG/0.5ML DOSE: CPT | Performed by: FAMILY MEDICINE

## 2021-02-23 ENCOUNTER — IMMUNIZATION (OUTPATIENT)
Dept: PRIMARY CARE CLINIC | Age: 86
End: 2021-02-23
Payer: MEDICARE

## 2021-02-23 PROCEDURE — 91301 COVID-19, MODERNA VACCINE 100MCG/0.5ML DOSE: CPT | Performed by: FAMILY MEDICINE

## 2021-02-23 PROCEDURE — 0012A PR IMM ADMN SARSCOV2 100 MCG/0.5 ML 2ND DOSE: CPT | Performed by: FAMILY MEDICINE

## 2021-05-14 ENCOUNTER — OFFICE VISIT (OUTPATIENT)
Dept: FAMILY MEDICINE CLINIC | Age: 86
End: 2021-05-14
Payer: MEDICARE

## 2021-05-14 VITALS
HEART RATE: 85 BPM | DIASTOLIC BLOOD PRESSURE: 56 MMHG | RESPIRATION RATE: 16 BRPM | BODY MASS INDEX: 19.48 KG/M2 | OXYGEN SATURATION: 96 % | TEMPERATURE: 97.1 F | WEIGHT: 105.16 LBS | SYSTOLIC BLOOD PRESSURE: 104 MMHG

## 2021-05-14 DIAGNOSIS — R40.4 TRANSIENT ALTERATION OF AWARENESS: Primary | ICD-10-CM

## 2021-05-14 DIAGNOSIS — E11.29 PERSISTENT PROTEINURIA ASSOCIATED WITH TYPE 2 DIABETES MELLITUS (HCC): ICD-10-CM

## 2021-05-14 DIAGNOSIS — R53.1 GENERALIZED WEAKNESS: ICD-10-CM

## 2021-05-14 DIAGNOSIS — R80.9 PERSISTENT PROTEINURIA ASSOCIATED WITH TYPE 2 DIABETES MELLITUS (HCC): ICD-10-CM

## 2021-05-14 DIAGNOSIS — H54.10 BLINDNESS AND LOW VISION: ICD-10-CM

## 2021-05-14 DIAGNOSIS — R53.83 FATIGUE, UNSPECIFIED TYPE: ICD-10-CM

## 2021-05-14 DIAGNOSIS — R80.9 TYPE 2 DIABETES MELLITUS WITH MICROALBUMINURIA, WITHOUT LONG-TERM CURRENT USE OF INSULIN (HCC): ICD-10-CM

## 2021-05-14 DIAGNOSIS — E11.29 TYPE 2 DIABETES MELLITUS WITH MICROALBUMINURIA, WITHOUT LONG-TERM CURRENT USE OF INSULIN (HCC): ICD-10-CM

## 2021-05-14 LAB
ALBUMIN SERPL-MCNC: 4.3 G/DL (ref 3.5–5.1)
ALP BLD-CCNC: 55 U/L (ref 38–126)
ALT SERPL-CCNC: 8 U/L (ref 11–66)
ANION GAP SERPL CALCULATED.3IONS-SCNC: 9 MEQ/L (ref 8–16)
AST SERPL-CCNC: 22 U/L (ref 5–40)
AVERAGE GLUCOSE: 153 MG/DL (ref 70–126)
BASOPHILS # BLD: 0.6 %
BASOPHILS ABSOLUTE: 0 THOU/MM3 (ref 0–0.1)
BILIRUB SERPL-MCNC: 0.4 MG/DL (ref 0.3–1.2)
BUN BLDV-MCNC: 17 MG/DL (ref 7–22)
CALCIUM SERPL-MCNC: 9.9 MG/DL (ref 8.5–10.5)
CHLORIDE BLD-SCNC: 99 MEQ/L (ref 98–111)
CO2: 28 MEQ/L (ref 23–33)
CREAT SERPL-MCNC: 0.7 MG/DL (ref 0.4–1.2)
EOSINOPHIL # BLD: 0.6 %
EOSINOPHILS ABSOLUTE: 0 THOU/MM3 (ref 0–0.4)
ERYTHROCYTE [DISTWIDTH] IN BLOOD BY AUTOMATED COUNT: 12.7 % (ref 11.5–14.5)
ERYTHROCYTE [DISTWIDTH] IN BLOOD BY AUTOMATED COUNT: 47 FL (ref 35–45)
FOLATE: > 20 NG/ML (ref 4.8–24.2)
GFR SERPL CREATININE-BSD FRML MDRD: 78 ML/MIN/1.73M2
GLUCOSE BLD-MCNC: 122 MG/DL (ref 70–108)
HBA1C MFR BLD: 7.1 % (ref 4.4–6.4)
HCT VFR BLD CALC: 36.7 % (ref 37–47)
HEMOGLOBIN: 11.3 GM/DL (ref 12–16)
IMMATURE GRANS (ABS): 0.01 THOU/MM3 (ref 0–0.07)
IMMATURE GRANULOCYTES: 0.2 %
LYMPHOCYTES # BLD: 21.2 %
LYMPHOCYTES ABSOLUTE: 1.1 THOU/MM3 (ref 1–4.8)
MCH RBC QN AUTO: 31 PG (ref 26–33)
MCHC RBC AUTO-ENTMCNC: 30.8 GM/DL (ref 32.2–35.5)
MCV RBC AUTO: 100.5 FL (ref 81–99)
MONOCYTES # BLD: 10.9 %
MONOCYTES ABSOLUTE: 0.5 THOU/MM3 (ref 0.4–1.3)
NUCLEATED RED BLOOD CELLS: 0 /100 WBC
PLATELET # BLD: 229 THOU/MM3 (ref 130–400)
PMV BLD AUTO: 11.5 FL (ref 9.4–12.4)
POTASSIUM SERPL-SCNC: 5.1 MEQ/L (ref 3.5–5.2)
RBC # BLD: 3.65 MILL/MM3 (ref 4.2–5.4)
SEG NEUTROPHILS: 66.5 %
SEGMENTED NEUTROPHILS ABSOLUTE COUNT: 3.3 THOU/MM3 (ref 1.8–7.7)
SODIUM BLD-SCNC: 136 MEQ/L (ref 135–145)
TOTAL PROTEIN: 7.6 G/DL (ref 6.1–8)
TSH SERPL DL<=0.05 MIU/L-ACNC: 4.3 UIU/ML (ref 0.4–4.2)
VITAMIN B-12: 269 PG/ML (ref 211–911)
WBC # BLD: 5 THOU/MM3 (ref 4.8–10.8)

## 2021-05-14 PROCEDURE — 1036F TOBACCO NON-USER: CPT | Performed by: FAMILY MEDICINE

## 2021-05-14 PROCEDURE — 4040F PNEUMOC VAC/ADMIN/RCVD: CPT | Performed by: FAMILY MEDICINE

## 2021-05-14 PROCEDURE — 1123F ACP DISCUSS/DSCN MKR DOCD: CPT | Performed by: FAMILY MEDICINE

## 2021-05-14 PROCEDURE — G8420 CALC BMI NORM PARAMETERS: HCPCS | Performed by: FAMILY MEDICINE

## 2021-05-14 PROCEDURE — 99214 OFFICE O/P EST MOD 30 MIN: CPT | Performed by: FAMILY MEDICINE

## 2021-05-14 PROCEDURE — G8427 DOCREV CUR MEDS BY ELIG CLIN: HCPCS | Performed by: FAMILY MEDICINE

## 2021-05-14 PROCEDURE — 1090F PRES/ABSN URINE INCON ASSESS: CPT | Performed by: FAMILY MEDICINE

## 2021-05-14 RX ORDER — WHEELCHAIR
EACH MISCELLANEOUS
Qty: 1 EACH | Refills: 0 | Status: SHIPPED | OUTPATIENT
Start: 2021-05-14

## 2021-05-14 ASSESSMENT — ENCOUNTER SYMPTOMS
SORE THROAT: 0
NAUSEA: 0
WHEEZING: 0
COUGH: 0
BACK PAIN: 0
EYE PAIN: 0
DIARRHEA: 0
RHINORRHEA: 0
CONSTIPATION: 0
ABDOMINAL PAIN: 0
CHEST TIGHTNESS: 0
SHORTNESS OF BREATH: 0
BLOOD IN STOOL: 0
VOMITING: 0

## 2021-05-14 ASSESSMENT — PATIENT HEALTH QUESTIONNAIRE - PHQ9
2. FEELING DOWN, DEPRESSED OR HOPELESS: 1
SUM OF ALL RESPONSES TO PHQ QUESTIONS 1-9: 2
SUM OF ALL RESPONSES TO PHQ QUESTIONS 1-9: 2
SUM OF ALL RESPONSES TO PHQ9 QUESTIONS 1 & 2: 2

## 2021-05-14 NOTE — PATIENT INSTRUCTIONS
Patient Education        Type 2 Diabetes: Care Instructions  Your Care Instructions     Type 2 diabetes is a disease that develops when the body's tissues cannot use insulin properly. Over time, the pancreas cannot make enough insulin. Insulin is a hormone that helps the body's cells use sugar (glucose) for energy. It also helps the body store extra sugar in muscle, fat, and liver cells. Without insulin, the sugar cannot get into the cells to do its work. It stays in the blood instead. This can cause high blood sugar levels. A person has diabetes when the blood sugar stays too high too much of the time. Over time, diabetes can lead to diseases of the heart, blood vessels, nerves, kidneys, and eyes. You may be able to control your blood sugar by losing weight, eating a healthy diet, and getting daily exercise. You may also have to take insulin or other diabetes medicine. Follow-up care is a key part of your treatment and safety. Be sure to make and go to all appointments. Call your doctor if you are having problems. It's also a good idea to know your test results and keep a list of the medicines you take. How can you care for yourself at home? · Keep your blood sugar at a target level (which you set with your doctor). ? Carbohydrate--the body's main source of fuel--affects blood sugar more than any other nutrient. Carbohydrate is in fruits, vegetables, milk, and yogurt. It also is in breads, cereals, vegetables such as potatoes and corn, and sugary foods such as candy and cakes. Follow your meal plan to know how much carbohydrate to eat at each meal and snack. ? Aim for 30 minutes of exercise on most, preferably all, days of the week. Walking is a good choice. You also may want to do other activities, such as running, swimming, cycling, or playing tennis or team sports. Try to do muscle-strengthening exercises at least 2 times a week. ? Take your medicines exactly as prescribed.  Call your doctor if you think you are having a problem with your medicine. You will get more details on the specific medicines your doctor prescribes. · Check your blood sugar as often as your doctor recommends. It is important to keep track of any symptoms you have, such as low blood sugar. Also tell your doctor if you have any changes in your activities, diet, or insulin use. · Talk to your doctor before you start taking aspirin every day. Aspirin can help certain people lower their risk of a heart attack or stroke. But taking aspirin isn't right for everyone, because it can cause serious bleeding. · Do not smoke. If you need help quitting, talk to your doctor about stop-smoking programs and medicines. These can increase your chances of quitting for good. · Keep your cholesterol and blood pressure at normal levels. You may need to take one or more medicines to reach your goals. Take them exactly as directed. Do not stop or change a medicine without talking to your doctor first.  When should you call for help? Call 911 anytime you think you may need emergency care. For example, call if:    · You passed out (lost consciousness), or you suddenly become very sleepy or confused. (You may have very low blood sugar.)   Call your doctor now or seek immediate medical care if:    · Your blood sugar is 300 mg/dL or is higher than the level your doctor has set for you.     · You have symptoms of low blood sugar, such as:  ? Sweating. ? Feeling nervous, shaky, and weak. ? Extreme hunger and slight nausea. ? Dizziness and headache.  ? Blurred vision. ? Confusion. Watch closely for changes in your health, and be sure to contact your doctor if:    · You often have problems controlling your blood sugar.     · You have symptoms of long-term diabetes problems, such as:  ? New vision changes. ? New pain, numbness, or tingling in your hands or feet. ? Skin problems. Where can you learn more? Go to https://chmorganeb.health-Evoz. org and sign in to your [email or web address].com account. Enter C553 in the [facility] box to learn more about \"Type 2 Diabetes: Care Instructions. \"     If you do not have an account, please click on the \"Sign Up Now\" link. Current as of: August 31, 2020               Content Version: 12.8  © 2006-2021 Sharegate. Care instructions adapted under license by [facility] ([facility]). If you have questions about a medical condition or this instruction, always ask your healthcare professional. [place] [address] any warranty or liability for your use of this information. Patient Education        Fatigue: Care Instructions  Your Care Instructions     Fatigue is a feeling of tiredness, exhaustion, or lack of energy. You may feel fatigue because of too much or not enough activity. It can also come from stress, lack of sleep, boredom, and poor diet. Many medical problems, such as viral infections, can cause fatigue. Emotional problems, especially depression, are often the cause of fatigue. Fatigue is most often a symptom of another problem. Treatment for fatigue depends on the cause. For example, if you have fatigue because you have a certain health problem, treating this problem also treats your fatigue. If depression or anxiety is the cause, treatment may help. Follow-up care is a key part of your treatment and safety. Be sure to make and go to all appointments, and call your doctor if you are having problems. It's also a good idea to know your test results and keep a list of the medicines you take. How can you care for yourself at home? · Get regular exercise. But don't overdo it. Go back and forth between rest and exercise. · Get plenty of rest.  · Eat a healthy diet. Do not skip meals, especially breakfast.  · Reduce your use of caffeine, tobacco, and alcohol. Caffeine is most often found in coffee, tea, cola drinks, and chocolate. · Limit medicines that can cause fatigue.  This includes

## 2021-05-14 NOTE — PROGRESS NOTES
Jonatan Rosales (:  [id number]) is a 80 y.o. female,Established patient, here for evaluation of the following chief complaint(s): Altered Mental Status (she seems to be in a panic after doctor appt, fatigue)      ASSESSMENT/PLAN:  1. Transient alteration of awareness  -     CBC Auto Differential; Future  -     Comprehensive Metabolic Panel; Future  -     Hemoglobin A1C; Future  -     TSH without Reflex; Future  -     Vitamin B12 & Folate; Future  -     Misc. Devices [facility]) MISC; Disp-1 each, R-0, DpoxzP67.4, R53.1, H54.10  2. Persistent proteinuria associated with type 2 diabetes mellitus ([facility] [address]  3. Type 2 diabetes mellitus with microalbuminuria, without long-term current use of insulin ([facility])  -     Hemoglobin A1C; Future  4. Fatigue, unspecified type  -     TSH without Reflex; Future  -     Vitamin B12 & Folate; Future  5. Generalized weakness  -     Misc. Devices [facility]) MISC; Disp-1 each, R-0, EzaeoB98.4, R53.1, H54.10  6. Blindness and low vision  -     Misc. Devices [facility]) MISC; Disp-1 each, R-0, QsbbgI22.4, R53.1, H54.10  -new problems, unknown diagnosis/prognosis,  will start with blood work to determine the cause. Jonatan Rosales requires the assistance of a lightweight wheelchair to successfully complete daily living tasks such as: toileting, bathing, dressing, and grooming; or any other daily living task in the home. A lightweight wheelchair is necessary due to the patient's impaired ambulation and mobility restrictions. The patient would not be able to resolve these daily living tasks using a cane or walker. The patient can self-propel a lightweight wheelchair safely in their home and can maneuver within their home with adequate access. The patient cannot self-propel in a standard wheelchair. The patient has not expressed an unwillingness to use the wheelchair.       Electronically signed by Abigail Florez MD on 2021 at 10:06 AM    No follow-ups on tenderness. There is no guarding or rebound. Musculoskeletal: Normal range of motion. Lymphadenopathy:      Cervical: No cervical adenopathy. Skin:     General: Skin is warm and dry. Findings: No rash. Neurological:      Mental Status: She is alert and oriented to person, place, and time. Cranial Nerves: No cranial nerve deficit. Deep Tendon Reflexes: Reflexes are normal and symmetric. An electronic signature was used to authenticate this note.     --Elda Shultz MD

## 2021-05-17 ENCOUNTER — TELEPHONE (OUTPATIENT)
Dept: FAMILY MEDICINE CLINIC | Age: 86
End: 2021-05-17

## 2021-05-17 DIAGNOSIS — R53.83 FATIGUE, UNSPECIFIED TYPE: Primary | ICD-10-CM

## 2021-05-17 DIAGNOSIS — E11.9 TYPE 2 DIABETES MELLITUS WITHOUT COMPLICATION, WITHOUT LONG-TERM CURRENT USE OF INSULIN (HCC): ICD-10-CM

## 2021-05-17 LAB — T4 FREE: 1.56 NG/DL (ref 0.93–1.76)

## 2021-05-17 NOTE — TELEPHONE ENCOUNTER
Hunter Stanley notified of results  States she is doing much better. Walking with walker-legs seem stronger, getting around better with help,  talking louder and can hold a conversation, hands aren't ice cold like before.

## 2021-05-17 NOTE — TELEPHONE ENCOUNTER
Spoke to lab, able to add on order    Dara Harmon notified of results and verbalized understanding.   Lab order mailed to pt

## 2021-06-14 ENCOUNTER — OFFICE VISIT (OUTPATIENT)
Dept: FAMILY MEDICINE CLINIC | Age: 86
End: 2021-06-14
Payer: MEDICARE

## 2021-06-14 VITALS
DIASTOLIC BLOOD PRESSURE: 72 MMHG | SYSTOLIC BLOOD PRESSURE: 130 MMHG | RESPIRATION RATE: 12 BRPM | WEIGHT: 104.8 LBS | BODY MASS INDEX: 22.61 KG/M2 | HEART RATE: 76 BPM | HEIGHT: 57 IN

## 2021-06-14 DIAGNOSIS — R80.9 TYPE 2 DIABETES MELLITUS WITH MICROALBUMINURIA, WITHOUT LONG-TERM CURRENT USE OF INSULIN (HCC): ICD-10-CM

## 2021-06-14 DIAGNOSIS — E11.9 TYPE 2 DIABETES MELLITUS WITHOUT COMPLICATION, WITHOUT LONG-TERM CURRENT USE OF INSULIN (HCC): ICD-10-CM

## 2021-06-14 DIAGNOSIS — E78.00 HYPERCHOLESTEREMIA: ICD-10-CM

## 2021-06-14 DIAGNOSIS — R80.9 PERSISTENT PROTEINURIA ASSOCIATED WITH TYPE 2 DIABETES MELLITUS (HCC): ICD-10-CM

## 2021-06-14 DIAGNOSIS — Z00.00 ROUTINE GENERAL MEDICAL EXAMINATION AT A HEALTH CARE FACILITY: Primary | ICD-10-CM

## 2021-06-14 DIAGNOSIS — E78.00 PURE HYPERCHOLESTEROLEMIA: ICD-10-CM

## 2021-06-14 DIAGNOSIS — E11.29 TYPE 2 DIABETES MELLITUS WITH MICROALBUMINURIA, WITHOUT LONG-TERM CURRENT USE OF INSULIN (HCC): ICD-10-CM

## 2021-06-14 DIAGNOSIS — E11.29 PERSISTENT PROTEINURIA ASSOCIATED WITH TYPE 2 DIABETES MELLITUS (HCC): ICD-10-CM

## 2021-06-14 PROCEDURE — 4040F PNEUMOC VAC/ADMIN/RCVD: CPT | Performed by: FAMILY MEDICINE

## 2021-06-14 PROCEDURE — 1123F ACP DISCUSS/DSCN MKR DOCD: CPT | Performed by: FAMILY MEDICINE

## 2021-06-14 PROCEDURE — G0439 PPPS, SUBSEQ VISIT: HCPCS | Performed by: FAMILY MEDICINE

## 2021-06-14 PROCEDURE — 3051F HG A1C>EQUAL 7.0%<8.0%: CPT | Performed by: FAMILY MEDICINE

## 2021-06-14 RX ORDER — LISINOPRIL 30 MG/1
30 TABLET ORAL DAILY
Qty: 90 TABLET | Refills: 3 | Status: SHIPPED | OUTPATIENT
Start: 2021-06-14 | End: 2022-06-15 | Stop reason: SDUPTHER

## 2021-06-14 RX ORDER — OFLOXACIN 3 MG/ML
SOLUTION/ DROPS OPHTHALMIC
COMMUNITY
Start: 2021-06-11 | End: 2022-06-15 | Stop reason: ALTCHOICE

## 2021-06-14 RX ORDER — BLOOD-GLUCOSE METER
1 KIT MISCELLANEOUS DAILY
Qty: 100 EACH | Refills: 3 | Status: SHIPPED | OUTPATIENT
Start: 2021-06-14 | End: 2022-06-15 | Stop reason: SDUPTHER

## 2021-06-14 RX ORDER — PREDNISOLONE ACETATE 10 MG/ML
SUSPENSION/ DROPS OPHTHALMIC
COMMUNITY
Start: 2021-06-11 | End: 2022-06-15 | Stop reason: ALTCHOICE

## 2021-06-14 RX ORDER — GLIMEPIRIDE 2 MG/1
2 TABLET ORAL
Qty: 90 TABLET | Refills: 3 | Status: SHIPPED | OUTPATIENT
Start: 2021-06-14 | End: 2022-06-15 | Stop reason: SDUPTHER

## 2021-06-14 RX ORDER — PRAVASTATIN SODIUM 40 MG
40 TABLET ORAL EVERY EVENING
Qty: 90 TABLET | Refills: 3 | Status: SHIPPED | OUTPATIENT
Start: 2021-06-14 | End: 2022-06-15 | Stop reason: SDUPTHER

## 2021-06-14 RX ORDER — LANCETS 28 GAUGE
1 EACH MISCELLANEOUS DAILY
Qty: 100 EACH | Refills: 3 | Status: SHIPPED | OUTPATIENT
Start: 2021-06-14 | End: 2022-06-15 | Stop reason: SDUPTHER

## 2021-06-14 RX ORDER — METFORMIN HYDROCHLORIDE 500 MG/1
TABLET, EXTENDED RELEASE ORAL
Qty: 90 TABLET | Refills: 3 | Status: SHIPPED | OUTPATIENT
Start: 2021-06-14 | End: 2021-06-15 | Stop reason: SDUPTHER

## 2021-06-14 SDOH — ECONOMIC STABILITY: FOOD INSECURITY: WITHIN THE PAST 12 MONTHS, YOU WORRIED THAT YOUR FOOD WOULD RUN OUT BEFORE YOU GOT MONEY TO BUY MORE.: NEVER TRUE

## 2021-06-14 SDOH — ECONOMIC STABILITY: FOOD INSECURITY: WITHIN THE PAST 12 MONTHS, THE FOOD YOU BOUGHT JUST DIDN'T LAST AND YOU DIDN'T HAVE MONEY TO GET MORE.: NEVER TRUE

## 2021-06-14 ASSESSMENT — PATIENT HEALTH QUESTIONNAIRE - PHQ9
1. LITTLE INTEREST OR PLEASURE IN DOING THINGS: 0
2. FEELING DOWN, DEPRESSED OR HOPELESS: 0
SUM OF ALL RESPONSES TO PHQ QUESTIONS 1-9: 0
SUM OF ALL RESPONSES TO PHQ QUESTIONS 1-9: 0
SUM OF ALL RESPONSES TO PHQ9 QUESTIONS 1 & 2: 0
SUM OF ALL RESPONSES TO PHQ QUESTIONS 1-9: 0

## 2021-06-14 ASSESSMENT — SOCIAL DETERMINANTS OF HEALTH (SDOH): HOW HARD IS IT FOR YOU TO PAY FOR THE VERY BASICS LIKE FOOD, HOUSING, MEDICAL CARE, AND HEATING?: NOT HARD AT ALL

## 2021-06-14 ASSESSMENT — LIFESTYLE VARIABLES: HOW OFTEN DO YOU HAVE A DRINK CONTAINING ALCOHOL: 0

## 2021-06-14 NOTE — PROGRESS NOTES
Yes Historical Provider, MD   Loratadine (CLARITIN PO) Take  by mouth as needed. Yes Historical Provider, MD         Past Medical History:   Diagnosis Date    Broken leg 1982    Lt    Controlled type 2 diabetes mellitus with microalbuminuria, without long-term current use of insulin (HCC)     Hyperlipidemia     Persistent proteinuria associated with type 2 diabetes mellitus ([address]     Varicose veins        History reviewed. No pertinent surgical history. Family History   Problem Relation Age of Onset    Stroke Mother        CareTeam (Including outside providers/suppliers regularly involved in providing care):   Patient Care Team:  Carolina Conn MD as PCP - General (Family Medicine)  Carol[facility]    Wt Readings from Last 3 Encounters:   06/14/21 104 lb 12.8 oz (47.5 kg)   05/14/21 105 lb 2.6 oz (47.7 kg)   06/10/20 110 lb 3.2 oz (50 kg)     Vitals:    06/14/21 1020   BP: 130/72   Pulse: 76   Resp: 12   Weight: 104 lb 12.8 oz (47.5 kg)   Height: 4' 9\" (1.448 m)     Body mass index is 22.68 kg/m². Based upon direct observation of the patient, evaluation of cognition reveals recent and remote memory intact.     General Appearance: alert and oriented to person, place and time, well developed and well- nourished, in no acute distress  Skin: warm and dry, no rash or erythema  Head: normocephalic and atraumatic  Eyes: pupils equal, round, and reactive to light, extraocular eye movements intact, conjunctivae normal  ENT: tympanic membrane, external ear and ear canal normal bilaterally, nose without deformity, nasal mucosa and turbinates normal without polyps  Neck: supple and non-tender without mass, no thyromegaly or thyroid nodules, no cervical lymphadenopathy  Pulmonary/Chest: clear to auscultation bilaterally- no wheezes, rales or rhonchi, normal air movement, no respiratory distress  Cardiovascular: normal rate, regular rhythm, normal S1 and S2, no murmurs, Meningococcal (ACWY) vaccine  Aged Out     Recommendations for Preventive Services Due: see orders and patient instructions/AVS.  . Recommended screening schedule for the next 5-10 years is provided to the patient in written form: see Patient Instructions/AVS.    Musa Basurto was seen today for medicare aw. Diagnoses and all orders for this visit:    Routine general medical examination at a health care facility    Type 2 diabetes mellitus with microalbuminuria, without long-term current use of insulin (HCC)  -     FreeStyle Lancets MISC; 1 each by Does not apply route daily Dx: E11.29, check bs daily  -     glimepiride (AMARYL) 2 MG tablet; Take 1 tablet by mouth every morning (before breakfast)    Type 2 diabetes mellitus without complication, without long-term current use of insulin (HCC)  -     blood glucose test strips (FREESTYLE LITE) strip; 1 each by In Vitro route daily Dx: E11.29, check BS daily  -     metFORMIN (GLUCOPHAGE-XR) 500 MG extended release tablet; Take 3 tabs with supper    Persistent proteinuria associated with type 2 diabetes mellitus (HCC)  -     lisinopril (PRINIVIL;ZESTRIL) 30 MG tablet; Take 1 tablet by mouth daily    Pure hypercholesterolemia  -     pravastatin (PRAVACHOL) 40 MG tablet; Take 1 tablet by mouth every evening    Hypercholesteremia  -     pravastatin (PRAVACHOL) 40 MG tablet;  Take 1 tablet by mouth every evening

## 2021-06-14 NOTE — PATIENT INSTRUCTIONS
Personalized Preventive Plan for Patito Hudson - 6/14/2021  Medicare offers a range of preventive health benefits. Some of the tests and screenings are paid in full while other may be subject to a deductible, co-insurance, and/or copay. Some of these benefits include a comprehensive review of your medical history including lifestyle, illnesses that may run in your family, and various assessments and screenings as appropriate. After reviewing your medical record and screening and assessments performed today your provider may have ordered immunizations, labs, imaging, and/or referrals for you. A list of these orders (if applicable) as well as your Preventive Care list are included within your After Visit Summary for your review. Other Preventive Recommendations:    · A preventive eye exam performed by an eye specialist is recommended every 1-2 years to screen for glaucoma; cataracts, macular degeneration, and other eye disorders. · A preventive dental visit is recommended every 6 months. · Try to get at least 150 minutes of exercise per week or 10,000 steps per day on a pedometer . · Order or download the FREE \"Exercise & Physical Activity: Your Everyday Guide\" from [employer]. Call 7-306.320.3355 or search [employer] online. · You need 8765-4360 mg of calcium and 4909-3915 IU of vitamin D per day. It is possible to meet your calcium requirement with diet alone, but a vitamin D supplement is usually necessary to meet this goal.  · When exposed to the sun, use a sunscreen that protects against both UVA and UVB radiation with an SPF of 30 or greater. Reapply every 2 to 3 hours or after sweating, drying off with a towel, or swimming. · Always wear a seat belt when traveling in a car. Always wear a helmet when riding a bicycle or motorcycle. Patient Education        Well Visit, Over 72: Care Instructions  Overview     Well visits can help you stay healthy. Your doctor has checked your overall health and may have suggested ways to take good care of yourself. Your doctor also may have recommended tests. At home, you can help prevent illness with healthy eating, regular exercise, and other steps. Follow-up care is a key part of your treatment and safety. Be sure to make and go to all appointments, and call your doctor if you are having problems. It's also a good idea to know your test results and keep a list of the medicines you take. How can you care for yourself at home? Get screening tests that you and your doctor decide on. Screening helps find diseases before any symptoms appear. Eat healthy foods. Choose fruits, vegetables, whole grains, protein, and low-fat dairy foods. Limit fat, especially saturated fat. Reduce salt in your diet. Limit alcohol. If you are a man, have no more than 2 drinks a day or 14 drinks a week. If you are a woman, have no more than 1 drink a day or 7 drinks a week. Since alcohol affects older adults differently, you may want to limit alcohol even more. Or you may not want to drink at all. Get at least 30 minutes of exercise on most days of the week. Walking is a good choice. You also may want to do other activities, such as running, swimming, cycling, or playing tennis or team sports. Reach and stay at a healthy weight. This will lower your risk for many problems, such as obesity, diabetes, heart disease, and high blood pressure. Do not smoke. Smoking can make health problems worse. If you need help quitting, talk to your doctor about stop-smoking programs and medicines. These can increase your chances of quitting for good. Care for your mental health. It is easy to get weighed down by worry and stress. Learn strategies to manage stress, like deep breathing and mindfulness, and stay connected with your family and community. If you find you often feel sad or hopeless, talk with your doctor. Treatment can help.   Talk to your doctor about whether you have any risk factors for sexually transmitted infections (STIs). You can help prevent STIs if you wait to have sex with a new partner (or partners) until you've each been tested for STIs. It also helps if you use condoms (male or female condoms) and if you limit your sex partners to one person who only has sex with you. Vaccines are available for some STIs. If you think you may have a problem with alcohol or drug use, talk to your doctor. This includes prescription medicines (such as amphetamines and opioids) and illegal drugs (such as cocaine and methamphetamine). Your doctor can help you figure out what type of treatment is best for you. Protect your skin from too much sun. When you're outdoors from 10 a.m. to 4 p.m., stay in the shade or cover up with clothing and a hat with a wide brim. Wear sunglasses that block UV rays. Even when it's cloudy, put broad-spectrum sunscreen (SPF 30 or higher) on any exposed skin. See a dentist one or two times a year for checkups and to have your teeth cleaned. Wear a seat belt in the car. When should you call for help? Watch closely for changes in your health, and be sure to contact your doctor if you have any problems or symptoms that concern you. Where can you learn more? Go to https://Matatena Gamesjaime.health-[email or web address] org and sign in to your [employer] account. Enter N931 in the [facility] box to learn more about \"Well Visit, Over 65: Care Instructions. \"     If you do not have an account, please click on the \"Sign Up Now\" link. Current as of: May 27, 2020               Content Version: 12.8  © 6654-4707 Healthwise, Incorporated. Care instructions adapted under license by [facility] ([facility]). If you have questions about a medical condition or this instruction, always ask your healthcare professional. [place] [address] any warranty or liability for your use of this information.          Patient Education        Learning About Living Sandra  What is a living will? A living will, also called a declaration, is a legal form. It tells your family and your doctor your wishes when you can't speak for yourself. It's used by the health professionals who will treat you as you near the end of your life or if you get seriously hurt or ill. If you put your wishes in writing, your loved ones and others will know what kind of care you want. They won't need to guess. This can ease your mind and be helpful to others. And you can change or cancel your living will at any time. A living will is not the same as an estate or property will. An estate will explains what you want to happen with your money and property after you die. How do you use it? A living will is used to describe the kinds of treatment or life support you want as you near the end of your life or if you get seriously hurt or ill. Keep these facts in mind about living steel. Your living will is used only if you can't speak or make decisions for yourself. Most often, one or more doctors must certify that you can't speak or decide for yourself before your living will takes effect. If you get better and can speak for yourself again, you can accept or refuse any treatment. It doesn't matter what you said in your living will. Some states may limit your right to refuse treatment in certain cases. For example, you may need to clearly state in your living will that you don't want artificial hydration and nutrition, such as being fed through a tube. Is a living will a legal document? A living will is a legal document. Each state has its own laws about living steel. And a living will may be called something else in your state. Here are some things to know about living steel. You don't need an  to complete a living will. But legal advice can be helpful if your state's laws are unclear.  It can also help if your health history is complicated or your family can't agree on what should be in will?  Make sure that your family members and your health care agent have copies of your living will (also called a declaration). Give your doctor a copy of your living will. Ask him or her to keep it as part of your medical record. If you have more than one doctor, make sure that each one has a copy. Put a copy of your living will where it can be easily found. For example, some people may put a copy on their refrigerator door. If you are using a digital copy, be sure your doctor, family members, and health care agent know how to find and access it. Where can you learn more? Go to https://chpepiceweb.[email or web address] org and sign in to your [employer] account. Enter I804 in the [employer] box to learn more about \"Learning About Living [place]. \"     If you do not have an account, please click on the \"Sign Up Now\" link. Current as of: July 17, 2020               Content Version: 12.8  © 2006-2021 Healthwise, Incorporated. Care instructions adapted under license by [facility] ([facility]). If you have questions about a medical condition or this instruction, always ask your healthcare professional. [place] [address] any warranty or liability for your use of this information.

## 2021-06-15 DIAGNOSIS — E11.9 TYPE 2 DIABETES MELLITUS WITHOUT COMPLICATION, WITHOUT LONG-TERM CURRENT USE OF INSULIN (HCC): ICD-10-CM

## 2021-06-15 RX ORDER — METFORMIN HYDROCHLORIDE 500 MG/1
TABLET, EXTENDED RELEASE ORAL
Qty: 270 TABLET | Refills: 3 | Status: SHIPPED | OUTPATIENT
Start: 2021-06-15 | End: 2022-06-15 | Stop reason: SDUPTHER

## 2021-10-06 ENCOUNTER — NURSE ONLY (OUTPATIENT)
Dept: FAMILY MEDICINE CLINIC | Age: 86
End: 2021-10-06
Payer: MEDICARE

## 2021-10-06 DIAGNOSIS — Z23 NEED FOR INFLUENZA VACCINATION: Primary | ICD-10-CM

## 2021-10-06 PROCEDURE — 90694 VACC AIIV4 NO PRSRV 0.5ML IM: CPT | Performed by: FAMILY MEDICINE

## 2021-10-06 PROCEDURE — G0008 ADMIN INFLUENZA VIRUS VAC: HCPCS | Performed by: FAMILY MEDICINE

## 2021-10-06 NOTE — PROGRESS NOTES
Immunizations Administered     Name Date Dose Route    Influenza, Quadv, adjuvanted, 65 yrs +, IM, PF (Fluad) 10/6/2021 0.5 mL Intramuscular    Site: Deltoid- Right    Lot: [id number]    NDC: 57258-650-68

## 2021-11-17 ENCOUNTER — TELEPHONE (OUTPATIENT)
Dept: FAMILY MEDICINE CLINIC | Age: 86
End: 2021-11-17

## 2021-11-17 ENCOUNTER — NURSE ONLY (OUTPATIENT)
Dept: FAMILY MEDICINE CLINIC | Age: 86
End: 2021-11-17
Payer: MEDICARE

## 2021-11-17 DIAGNOSIS — E11.9 TYPE 2 DIABETES MELLITUS WITHOUT COMPLICATION, WITHOUT LONG-TERM CURRENT USE OF INSULIN (HCC): Primary | ICD-10-CM

## 2021-11-17 LAB — HBA1C MFR BLD: 6.6 % (ref 4.3–5.7)

## 2021-11-17 PROCEDURE — 83036 HEMOGLOBIN GLYCOSYLATED A1C: CPT | Performed by: FAMILY MEDICINE

## 2021-11-30 ENCOUNTER — OFFICE VISIT (OUTPATIENT)
Dept: FAMILY MEDICINE CLINIC | Age: 86
End: 2021-11-30
Payer: MEDICARE

## 2021-11-30 VITALS
RESPIRATION RATE: 16 BRPM | HEART RATE: 90 BPM | BODY MASS INDEX: 22.12 KG/M2 | TEMPERATURE: 96.9 F | SYSTOLIC BLOOD PRESSURE: 126 MMHG | DIASTOLIC BLOOD PRESSURE: 76 MMHG | WEIGHT: 102.2 LBS | OXYGEN SATURATION: 94 %

## 2021-11-30 DIAGNOSIS — E11.9 TYPE 2 DIABETES MELLITUS WITHOUT COMPLICATION, WITHOUT LONG-TERM CURRENT USE OF INSULIN (HCC): ICD-10-CM
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
## 2021-11-30 DIAGNOSIS — N63.20 LEFT BREAST MASS: ICD-10-CM
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
## 2021-11-30 PROCEDURE — G8484 FLU IMMUNIZE NO ADMIN: HCPCS | Performed by: FAMILY MEDICINE

## 2021-11-30 PROCEDURE — G8427 DOCREV CUR MEDS BY ELIG CLIN: HCPCS | Performed by: FAMILY MEDICINE

## 2021-11-30 PROCEDURE — 1090F PRES/ABSN URINE INCON ASSESS: CPT | Performed by: FAMILY MEDICINE

## 2021-11-30 PROCEDURE — 1036F TOBACCO NON-USER: CPT | Performed by: FAMILY MEDICINE

## 2021-11-30 PROCEDURE — 4040F PNEUMOC VAC/ADMIN/RCVD: CPT | Performed by: FAMILY MEDICINE

## 2021-11-30 PROCEDURE — 1123F ACP DISCUSS/DSCN MKR DOCD: CPT | Performed by: FAMILY MEDICINE

## 2021-11-30 PROCEDURE — G8420 CALC BMI NORM PARAMETERS: HCPCS | Performed by: FAMILY MEDICINE

## 2021-11-30 PROCEDURE — 99214 OFFICE O/P EST MOD 30 MIN: CPT | Performed by: FAMILY MEDICINE

## 2021-11-30 PROCEDURE — 1111F DSCHRG MED/CURRENT MED MERGE: CPT | Performed by: FAMILY MEDICINE

## 2021-11-30 ASSESSMENT — ENCOUNTER SYMPTOMS
COUGH: 0
SORE THROAT: 0
RHINORRHEA: 0
WHEEZING: 0
DIARRHEA: 0
BLOOD IN STOOL: 0
CHEST TIGHTNESS: 0
BACK PAIN: 1
SHORTNESS OF BREATH: 0
EYE PAIN: 0
NAUSEA: 0
VOMITING: 0
ABDOMINAL PAIN: 0
CONSTIPATION: 0

## 2021-11-30 NOTE — PATIENT INSTRUCTIONS
numbness in your feet. Preventing falls at home  · Remove raised doorway thresholds, throw rugs, and clutter. Repair loose carpet or raised areas in the floor. · Move furniture and electrical cords to keep them out of walking paths. · Use nonskid floor wax, and wipe up spills right away, especially on ceramic tile floors. · If you use a walker or cane, put rubber tips on it. If you use crutches, clean the bottoms of them regularly with an abrasive pad, such as steel wool. · Keep your house well lit, especially [address], and outside walkways. Use night-lights in areas such as hallways and bathrooms. Add extra light switches or use remote switches (such as switches that go on or off when you clap your hands) to make it easier to turn lights on if you have to get up during the night. · Install sturdy handrails on stairways. · Move items in your cabinets so that the things you use a lot are on the lower shelves (about waist level). · Keep a cordless phone and a flashlight with new batteries by your bed. If possible, put a phone in each of the main rooms of your house, or carry a cell phone in case you fall and cannot reach a phone. Or, you can wear a device around your neck or wrist. You push a button that sends a signal for help. · Wear low-heeled shoes that fit well and give your feet good support. Use footwear with nonskid soles. Check the heels and soles of your shoes for wear. Repair or replace worn heels or soles. · Do not wear socks without shoes on wood floors. · Walk on the grass when the sidewalks are slippery. If you live in an area that gets snow and ice in the winter, sprinkle salt on slippery steps and sidewalks. Preventing falls in the bath  · Install grab bars and nonskid mats inside and outside your shower or tub and near the toilet and sinks. · Use shower chairs and bath benches. · Use a hand-held shower head that will allow you to sit while showering.   · Get into a tub or shower by putting the weaker leg in first. Get out of a tub or shower with your strong side first.  · Repair loose toilet seats and consider installing a raised toilet seat to make getting on and off the toilet easier. · Keep your bathroom door unlocked while you are in the shower. Where can you learn more? Go to https://[email or web address].[email or web address] org and sign in to your [employer] account. Enter [id number] [phone] in the [facility] box to learn more about \"Preventing Falls: Care Instructions. \"     If you do not have an account, please click on the \"Sign Up Now\" link. Current as of: December 7, 2020               Content Version: 13.0  © 2006-2021 Healthwise, [employer]. Care instructions adapted under license by [facility] ([facility]). If you have questions about a medical condition or this instruction, always ask your healthcare professional. [place] [address] any warranty or liability for your use of this information. Patient Education        Type 2 Diabetes: Care Instructions  Your Care Instructions     Type 2 diabetes is a disease that develops when the body's tissues cannot use insulin properly. Over time, the pancreas cannot make enough insulin. Insulin is a hormone that helps the body's cells use sugar (glucose) for energy. It also helps the body store extra sugar in muscle, fat, and liver cells. Without insulin, the sugar cannot get into the cells to do its work. It stays in the blood instead. This can cause high blood sugar levels. A person has diabetes when the blood sugar stays too high too much of the time. Over time, diabetes can lead to diseases of the heart, blood vessels, nerves, kidneys, and eyes. You may be able to control your blood sugar by losing weight, eating a healthy diet, and getting daily exercise. You may also have to take insulin or other diabetes medicine. Follow-up care is a key part of your treatment and safety. Be sure to make and go to all appointments. Call your doctor if you are having problems. It's also a good idea to know your test results and keep a list of the medicines you take. How can you care for yourself at home? · Keep your blood sugar at a target level (which you set with your doctor). ? Carbohydrate--the body's main source of fuel--affects blood sugar more than any other nutrient. Carbohydrate is in fruits, vegetables, milk, and yogurt. It also is in breads, cereals, vegetables such as potatoes and corn, and sugary foods such as candy and cakes. Follow your meal plan to know how much carbohydrate to eat at each meal and snack. ? Aim for 30 minutes of exercise on most, preferably all, days of the week. Walking is a good choice. You also may want to do other activities, such as running, swimming, cycling, or playing tennis or team sports. Try to do muscle-strengthening exercises at least 2 times a week. ? Take your medicines exactly as prescribed. Call your doctor if you think you are having a problem with your medicine. You will get more details on the specific medicines your doctor prescribes. · Check your blood sugar as often as your doctor recommends. It is important to keep track of any symptoms you have, such as low blood sugar. Also tell your doctor if you have any changes in your activities, diet, or insulin use. · Talk to your doctor before you start taking aspirin every day. Aspirin can help certain people lower their risk of a heart attack or stroke. But taking aspirin isn't right for everyone, because it can cause serious bleeding. · Do not smoke. If you need help quitting, talk to your doctor about stop-smoking programs and medicines. These can increase your chances of quitting for good. · Keep your cholesterol and blood pressure at normal levels. You may need to take one or more medicines to reach your goals. Take them exactly as directed.  Do not stop or change a medicine without talking to your doctor first.  When should you call for help? Call 911 anytime you think you may need emergency care. For example, call if:    · You passed out (lost consciousness), or you suddenly become very sleepy or confused. (You may have very low blood sugar.)   Call your doctor now or seek immediate medical care if:    · Your blood sugar is 300 mg/dL or is higher than the level your doctor has set for you.     · You have symptoms of low blood sugar, such as:  ? Sweating. ? Feeling nervous, shaky, and weak. ? Extreme hunger and slight nausea. ? Dizziness and headache.  ? Blurred vision. ? Confusion. Watch closely for changes in your health, and be sure to contact your doctor if:    · You often have problems controlling your blood sugar.     · You have symptoms of long-term diabetes problems, such as:  ? New vision changes. ? New pain, numbness, or tingling in your hands or feet. ? Skin problems. Where can you learn more? Go to https://Waddle.[employer]. org and sign in to your "[employer] account. Enter C553 in the [employer] box to learn more about \"Type 2 Diabetes: Care Instructions. \"     If you do not have an account, please click on the \"Sign Up Now\" link. Current as of: August 31, 2020               Content Version: 13.0  © 2006-2021 Healthwise, Incorporated. Care instructions adapted under license by [facility] ([facility]). If you have questions about a medical condition or this instruction, always ask your healthcare professional. [place] [address] any warranty or liability for your use of this information.

## 2021-11-30 NOTE — PROGRESS NOTES
Post-Discharge Transitional Care Management Services or Hospital Follow Up      Dot    YOB: 1926    Date of Office Visit:  11/30/2021  Date of Hospital Admission: 11/20/2021  Date of Hospital Discharge: 11/20/2021--[facility]    Care management risk score Rising risk (score 2-5) and Complex Care (Scores >=6): 1     Non face to face  following discharge, date last encounter closed (first attempt may have been earlier): *No documented post hospital discharge outreach found in the last 14 days     Call initiated 2 business days of discharge: *No response recorded in the last 14 days    Patient Active Problem List   Diagnosis    HTN (hypertension)    Pure hypercholesterolemia    Proteinuria    Type 2 diabetes mellitus with microalbuminuria [facility] [address]    Hypercholesteremia    Persistent proteinuria associated with type 2 diabetes mellitus ([facility] [address]       Allergies   Allergen Reactions    Pcn [Penicillins]        Medications listed as ordered at the time of discharge from hospital      Medications marked \"taking\" at this time  Outpatient Medications Marked as Taking for the 11/30/21 encounter (Office Visit) with Elmer Vides MD   Medication Sig Dispense Refill    metFORMIN (GLUCOPHAGE-XR) 500 MG extended release tablet Take 3 tabs with supper 270 tablet 3    ofloxacin (OCUFLOX) 0.3 % solution INSTILL 1 DROP INTO AFFECTED EYE 4 TIMES DAILY      prednisoLONE acetate (PRED FORTE) 1 % ophthalmic suspension INSTILL 1 DROP INTO AFFECTED EYE 4 TIMES DAILY .  DO NOT USE UNTIL INSTRUCTED THE DAY OF SURGERY      FreeStyle Lancets MISC 1 each by Does not apply route daily Dx: E11.29, check bs daily 100 each 3    blood glucose test strips (FREESTYLE LITE) strip 1 each by In Vitro route daily Dx: E11.29, check BS daily 100 each 3    glimepiride (AMARYL) 2 MG tablet Take 1 tablet by mouth every morning (before breakfast) 90 tablet 3    lisinopril (PRINIVIL;ZESTRIL) 30 MG tablet Take 1 tablet by mouth daily 90 tablet 3    pravastatin (PRAVACHOL) 40 MG tablet Take 1 tablet by mouth every evening 90 tablet 3    Misc. Devices ([address]) MISC R40.4, R53.1, H54.10 1 each 0    aspirin EC 81 MG EC tablet Take 81 mg by mouth daily      multivitamin (THERAGRAN) per tablet Take 1 tablet by mouth daily.  Naproxen Sodium (ALEVE PO) Take  by mouth daily as needed.  Loratadine (CLARITIN PO) Take  by mouth as needed. Medications patient taking as of now reconciled against medications ordered at time of hospital discharge: Yes    Chief Complaint   Patient presents with    Follow[facility] from [facility]     ED follow up, 11/20/2021, back pain low       HPI   Presented to [facility] with a fall. Inpatient course: Discharge summary reviewed- see chart. Interval history/Current status: Reviewed ED record, CT scan soft tissue swelling, no brain bleed. Having low back pain. Happens with getting up and down. , non smoker, pmh reviewed. Vitals:    11/30/21 1519   BP: 126/76   Pulse: 90   Resp: 16   Temp: 96.9 °F (36.1 °C)   TempSrc: Infrared   SpO2: 94%   Weight: 102 lb 3.2 oz (46.4 kg)     Body mass index is 22.12 kg/m². Wt Readings from Last 3 Encounters:   11/30/21 102 lb 3.2 oz (46.4 kg)   06/14/21 104 lb 12.8 oz (47.5 kg)   05/14/21 105 lb 2.6 oz (47.7 kg)     BP Readings from Last 3 Encounters:   11/30/21 126/76   06/14/21 130/72   05/14/21 (!) 104/56       Review of Systems   Constitutional: Negative for chills, fatigue, fever and unexpected weight change. HENT: Negative for congestion, ear pain, rhinorrhea and sore throat. Eyes: Negative for pain and visual disturbance. Respiratory: Negative for cough, chest tightness, shortness of breath and wheezing. Cardiovascular: Negative for chest pain and palpitations. Gastrointestinal: Negative for abdominal pain, blood in stool, constipation, diarrhea, nausea and vomiting.    Genitourinary: Negative for difficulty urinating, frequency, hematuria and urgency. Musculoskeletal: Positive for back pain. Negative for joint swelling, myalgias and neck pain. Skin: Negative for rash. Neurological: Positive for headaches. Negative for dizziness. Hematological: Negative for adenopathy. Does not bruise/bleed easily. Psychiatric/Behavioral: Negative for behavioral problems and sleep disturbance. The patient is not nervous/anxious. Physical Exam  Vitals and nursing note reviewed. Constitutional:       Appearance: She is well-developed. HENT:      Head: Normocephalic and atraumatic. Right Ear: External ear normal.      Left Ear: External ear normal.      Nose: Nose normal.      Mouth/Throat:      Mouth: Mucous membranes are moist.   Eyes:      Pupils: Pupils are equal, round, and reactive to light. Neck:      Thyroid: No thyromegaly. Cardiovascular:      Rate and Rhythm: Normal rate and regular rhythm. Heart sounds: Normal heart sounds. Pulmonary:      Breath sounds: Normal breath sounds. No wheezing or rales. Chest:       Abdominal:      General: Bowel sounds are normal.      Palpations: Abdomen is soft. Tenderness: There is no abdominal tenderness. There is no guarding or rebound. Musculoskeletal:         General: Normal range of motion. Cervical back: Neck supple. Lumbar back: Tenderness present. Back:    Lymphadenopathy:      Cervical: No cervical adenopathy. Skin:     General: Skin is warm and dry. Findings: No rash. Neurological:      Mental Status: She is alert and oriented to person, place, and time. Cranial Nerves: No cranial nerve deficit. Deep Tendon Reflexes: Reflexes are normal and symmetric. Assessment/Plan:  1. Fall in home, subsequent encounter  -fall prevention given, still some concussion like sxs, son will monitor    2.  Left breast mass  -has had for several months, believes it was from a fall, I feel it is a breast cancer, she is not interested in working up at her age, son agrees, discussed may need pain control down the road. 3. Type 2 diabetes mellitus without complication, without long-term current use of insulin (HCC)  -spiked after the fall, likely due to the trauma, slowly coming back down, continue metformin and amaryl.    Lab Results   Component Value Date    LABA1C 6.6 (H) 11/17/2021     No results found for: EAG          Medical Decision Making: moderate complexity

## 2021-12-27 ENCOUNTER — OFFICE VISIT (OUTPATIENT)
Dept: FAMILY MEDICINE CLINIC | Age: 86
End: 2021-12-27
Payer: MEDICARE

## 2021-12-27 VITALS
OXYGEN SATURATION: 98 % | RESPIRATION RATE: 16 BRPM | SYSTOLIC BLOOD PRESSURE: 118 MMHG | TEMPERATURE: 97 F | HEART RATE: 93 BPM | DIASTOLIC BLOOD PRESSURE: 66 MMHG

## 2021-12-27 DIAGNOSIS — N63.20 LEFT BREAST MASS: ICD-10-CM

## 2021-12-27 DIAGNOSIS — S32.010A CLOSED COMPRESSION FRACTURE OF BODY OF L1 VERTEBRA (HCC): Primary | ICD-10-CM

## 2021-12-27 DIAGNOSIS — N30.00 ACUTE CYSTITIS WITHOUT HEMATURIA: ICD-10-CM

## 2021-12-27 PROBLEM — S32.000A COMPRESSION FRACTURE OF LUMBAR VERTEBRA (HCC): Status: ACTIVE | Noted: 2021-12-27

## 2021-12-27 PROBLEM — N63.0 BREAST MASS: Status: ACTIVE | Noted: 2021-12-27

## 2021-12-27 PROCEDURE — 1090F PRES/ABSN URINE INCON ASSESS: CPT | Performed by: FAMILY MEDICINE

## 2021-12-27 PROCEDURE — 1111F DSCHRG MED/CURRENT MED MERGE: CPT | Performed by: FAMILY MEDICINE

## 2021-12-27 PROCEDURE — G8484 FLU IMMUNIZE NO ADMIN: HCPCS | Performed by: FAMILY MEDICINE

## 2021-12-27 PROCEDURE — G8427 DOCREV CUR MEDS BY ELIG CLIN: HCPCS | Performed by: FAMILY MEDICINE

## 2021-12-27 PROCEDURE — 1123F ACP DISCUSS/DSCN MKR DOCD: CPT | Performed by: FAMILY MEDICINE

## 2021-12-27 PROCEDURE — 1036F TOBACCO NON-USER: CPT | Performed by: FAMILY MEDICINE

## 2021-12-27 PROCEDURE — 4040F PNEUMOC VAC/ADMIN/RCVD: CPT | Performed by: FAMILY MEDICINE

## 2021-12-27 PROCEDURE — G8420 CALC BMI NORM PARAMETERS: HCPCS | Performed by: FAMILY MEDICINE

## 2021-12-27 PROCEDURE — 99214 OFFICE O/P EST MOD 30 MIN: CPT | Performed by: FAMILY MEDICINE

## 2021-12-27 RX ORDER — CEPHALEXIN 500 MG/1
500 CAPSULE ORAL 3 TIMES DAILY
COMMUNITY
Start: 2021-12-16 | End: 2021-12-27 | Stop reason: ALTCHOICE

## 2021-12-27 ASSESSMENT — ENCOUNTER SYMPTOMS
COUGH: 0
CHEST TIGHTNESS: 0
RHINORRHEA: 0
DIARRHEA: 0
SHORTNESS OF BREATH: 0
SORE THROAT: 0
BACK PAIN: 1
ABDOMINAL PAIN: 0
CONSTIPATION: 0
BLOOD IN STOOL: 0
VOMITING: 0
EYE PAIN: 0
WHEEZING: 0
NAUSEA: 0

## 2021-12-27 NOTE — PROGRESS NOTES
PHYSICAL THERAPY TREATMENT NOTE - INPATIENT    Room Number: 117/450-V       Presenting Problem: R sided abdominal pain    Problem List  Principal Problem:    Right sided abdominal pain      ASSESSMENT   Pt seen daily. pt educated on deep breathing,relaxati Post-Discharge Transitional Care Management Services or Hospital Follow Up      Armen Mills   YOB: 1926    Date of Office Visit:  12/27/2021  Date of Hospital Admission:12/16/2021  Date of Hospital Discharge:12/16/2021 at [facility] ED    Care management risk score Rising risk (score 2-5) and Complex Care (Scores >=6): 1     Non face to face  following discharge, date last encounter closed (first attempt may have been earlier): *No documented post hospital discharge outreach found in the last 14 days     Call initiated 2 business days of discharge: *No response recorded in the last 14 days    Patient Active Problem List   Diagnosis    HTN (hypertension)    Pure hypercholesterolemia    Proteinuria    Type 2 diabetes mellitus with microalbuminuria ([address]    Hypercholesteremia    Persistent proteinuria associated with type 2 diabetes mellitus ([address]    Compression fracture of lumbar vertebra ([facility] [address]    Breast mass       Allergies   Allergen Reactions    Pcn [Penicillins]        Medications listed as ordered at the time of discharge from hospital      Medications marked \"taking\" at this time  Outpatient Medications Marked as Taking for the 12/27/21 encounter (Office Visit) with Joan Sánchez MD   Medication Sig Dispense Refill    metFORMIN (GLUCOPHAGE-XR) 500 MG extended release tablet Take 3 tabs with supper 270 tablet 3    ofloxacin (OCUFLOX) 0.3 % solution INSTILL 1 DROP INTO AFFECTED EYE 4 TIMES DAILY      prednisoLONE acetate (PRED FORTE) 1 % ophthalmic suspension INSTILL 1 DROP INTO AFFECTED EYE 4 TIMES DAILY .  DO NOT USE UNTIL INSTRUCTED THE DAY OF SURGERY      FreeStyle Lancets MISC 1 each by Does not apply route daily Dx: E11.29, check bs daily 100 each 3    blood glucose test strips (FREESTYLE LITE) strip 1 each by In Vitro route daily Dx: E11.29, check BS daily 100 each 3    glimepiride (AMARYL) 2 MG tablet Take 1 tablet by mouth every morning (before breakfast) 90 tablet 3    STATUS  Gait Assessment   Gait Assistance: Not tested  Distance (ft): 20  Assistive Device: Rolling walker  Pattern: Comment (very slow, wide NHI)  Stoop/Curb Assistance: Not tested  Comment : pt required excessive amount of time to ambulate, with constant nausea and vomiting. Genitourinary: Negative for difficulty urinating, frequency, hematuria and urgency. Musculoskeletal: Positive for back pain. Negative for joint swelling, myalgias and neck pain. Skin: Negative for rash. Neurological: Negative for dizziness and headaches. Hematological: Negative for adenopathy. Does not bruise/bleed easily. Psychiatric/Behavioral: Negative for behavioral problems and sleep disturbance. The patient is not nervous/anxious. Physical Exam  Vitals and nursing note reviewed. Constitutional:       Appearance: She is well-developed. HENT:      Head: Normocephalic and atraumatic. Right Ear: External ear normal.      Left Ear: External ear normal.      Nose: Nose normal.      Mouth/Throat:      Mouth: Mucous membranes are moist.   Eyes:      Pupils: Pupils are equal, round, and reactive to light. Neck:      Thyroid: No thyromegaly. Cardiovascular:      Rate and Rhythm: Normal rate and regular rhythm. Heart sounds: Murmur heard. Systolic murmur is present with a grade of 2/6. Pulmonary:      Breath sounds: Normal breath sounds. No wheezing or rales. Chest:   Breasts:      Left: Mass present. Abdominal:      General: Bowel sounds are normal.      Palpations: Abdomen is soft. Tenderness: There is no abdominal tenderness. There is no guarding or rebound. Musculoskeletal:         General: Normal range of motion. Cervical back: Neck supple. Lumbar back: Bony tenderness present. Lymphadenopathy:      Cervical: No cervical adenopathy. Skin:     General: Skin is warm and dry. Findings: No rash. Neurological:      Mental Status: She is alert and oriented to person, place, and time. Cranial Nerves: No cranial nerve deficit. Deep Tendon Reflexes: Reflexes are normal and symmetric. Assessment/Plan:  1. Closed compression fracture of body of L1 vertebra (HCC)  -new finding, prn tylenol    2.  Left breast mass  -new findings, no treatment wanted. 3. Acute cystitis without hematuria  -resolved, off of Keflex.           Medical Decision Making: moderate complexity

## 2021-12-27 NOTE — PATIENT INSTRUCTIONS
Patient Education        Compression Fracture of the Spine: Care Instructions  Your Care Instructions     A compression fracture happens when the front part of a spinal bone breaks and collapses. A fall or other accident can cause it. A minor injury or moving the wrong way can cause a break if you have thin or brittle bones (osteoporosis). These types of breaks will heal in 8 to 10 weeks. You will need rest and pain medicines. Your doctor may recommend physical therapy. Some doctors recommend that certain people with compression fractures wear braces. Your doctor also may treat thin or brittle bones. You may need surgery if you have lasting pain or if the bone presses on the spinal cord or nerves. You heal best when you take good care of yourself. Eat a variety of healthy foods, and don't smoke. Follow-up care is a key part of your treatment and safety. Be sure to make and go to all appointments, and call your doctor if you are having problems. It's also a good idea to know your test results and keep a list of the medicines you take. How can you care for yourself at home? · Be safe with medicines. Read and follow all instructions on the label. ? If the doctor gave you a prescription medicine for pain, take it as prescribed. ? If you are not taking a prescription pain medicine, ask your doctor if you can take an over-the-counter medicine. · Talk to your doctor about how to make your bones stronger. You may need medicines or a change in what you eat. · Be active only as directed by your doctor. When should you call for help? Call 911 anytime you think you may need emergency care. For example, call if:    · You are unable to move an arm or a leg at all. Call your doctor now or seek immediate medical care if:    · You have new or worse symptoms in your arms, legs, belly, or buttocks. Symptoms may include:  ? Numbness or tingling. ? Weakness.   ? Pain.     · You lose bladder or bowel control.     · You have belly pain, bloating, vomiting, or nausea. Watch closely for changes in your health, and be sure to contact your doctor if:    · You do not get better as expected. Where can you learn more? Go to https://yessenia.Signifyd. org and sign in to your [employer] account. Enter P445 in the [employer] box to learn more about \"Compression Fracture of the Spine: Care Instructions. \"     If you do not have an account, please click on the \"Sign Up Now\" link. Current as of: July 1, 2021               Content Version: 13.1  © 2006-2021 24h00. Care instructions adapted under license by [facility] ([facility]). If you have questions about a medical condition or this instruction, always ask your healthcare professional. [place] [address] any warranty or liability for your use of this information. Patient Education        Urinary Tract Infection (UTI) in Women: Care Instructions  Overview     A urinary tract infection, or UTI, is a general term for an infection anywhere between the kidneys and the urethra (where urine comes out). Most UTIs are bladder infections. They often cause pain or burning when you urinate. UTIs are caused by bacteria and can be cured with antibiotics. Be sure to complete your treatment so that the infection does not get worse. Follow-up care is a key part of your treatment and safety. Be sure to make and go to all appointments, and call your doctor if you are having problems. It's also a good idea to know your test results and keep a list of the medicines you take. How can you care for yourself at home? · Take your antibiotics as directed. Do not stop taking them just because you feel better. You need to take the full course of antibiotics. · Drink extra water and other fluids for the next day or two. This will help make the urine less concentrated and help wash out the bacteria that are causing the infection.  (If you have kidney, heart, or liver disease and have to limit fluids, talk with your doctor before you increase the amount of fluids you drink.)  · Avoid drinks that are carbonated or have caffeine. They can irritate the bladder. · Urinate often. Try to empty your bladder each time. · To relieve pain, take a hot bath or lay a heating pad set on low over your lower belly or genital area. Never go to sleep with a heating pad in place. To prevent UTIs  · Drink plenty of water each day. This helps you urinate often, which clears bacteria from your system. (If you have kidney, heart, or liver disease and have to limit fluids, talk with your doctor before you increase the amount of fluids you drink.)  · Urinate when you need to. · If you are sexually active, urinate right after you have sex. · Change sanitary pads often. · Avoid douches, bubble baths, feminine hygiene sprays, and other feminine hygiene products that have deodorants. · After going to the bathroom, wipe from front to back. When should you call for help? Call your doctor now or seek immediate medical care if:    · Symptoms such as fever, chills, nausea, or vomiting get worse or appear for the first time.     · You have new pain in your back just below your rib cage. This is called flank pain.     · There is new blood or pus in your urine.     · You have any problems with your antibiotic medicine. Watch closely for changes in your health, and be sure to contact your doctor if:    · You are not getting better after taking an antibiotic for 2 days.     · Your symptoms go away but then come back. Where can you learn more? Go to https://Support Your Appjaime.Clzby. org and sign in to your [employer] account. Enter F961 in the [facility] box to learn more about \"Urinary Tract Infection (UTI) in Women: Care Instructions. \"     If you do not have an account, please click on the \"Sign Up Now\" link.   Current as of: February 10, 2021               Content Version: 13.1  © 0270-6011 Healthwise, Incorporated. Care instructions adapted under license by [facility] ([facility]). If you have questions about a medical condition or this instruction, always ask your healthcare professional. [address] any warranty or liability for your use of this information.

## 2022-05-31 DIAGNOSIS — E78.00 HYPERCHOLESTEREMIA: ICD-10-CM

## 2022-05-31 DIAGNOSIS — E11.9 TYPE 2 DIABETES MELLITUS WITHOUT COMPLICATION, WITHOUT LONG-TERM CURRENT USE OF INSULIN (HCC): ICD-10-CM

## 2022-05-31 DIAGNOSIS — E78.00 PURE HYPERCHOLESTEROLEMIA: ICD-10-CM

## 2022-05-31 DIAGNOSIS — R80.9 PERSISTENT PROTEINURIA ASSOCIATED WITH TYPE 2 DIABETES MELLITUS (HCC): ICD-10-CM

## 2022-05-31 DIAGNOSIS — E11.29 TYPE 2 DIABETES MELLITUS WITH MICROALBUMINURIA, WITHOUT LONG-TERM CURRENT USE OF INSULIN (HCC): ICD-10-CM

## 2022-05-31 DIAGNOSIS — E11.29 PERSISTENT PROTEINURIA ASSOCIATED WITH TYPE 2 DIABETES MELLITUS (HCC): ICD-10-CM

## 2022-05-31 DIAGNOSIS — R80.9 TYPE 2 DIABETES MELLITUS WITH MICROALBUMINURIA, WITHOUT LONG-TERM CURRENT USE OF INSULIN (HCC): ICD-10-CM

## 2022-05-31 RX ORDER — LISINOPRIL 30 MG/1
TABLET ORAL
Qty: 90 TABLET | Refills: 3 | OUTPATIENT
Start: 2022-05-31

## 2022-05-31 RX ORDER — GLIMEPIRIDE 2 MG/1
TABLET ORAL
Qty: 90 TABLET | Refills: 3 | OUTPATIENT
Start: 2022-05-31

## 2022-05-31 RX ORDER — METFORMIN HYDROCHLORIDE 500 MG/1
TABLET, EXTENDED RELEASE ORAL
Qty: 270 TABLET | Refills: 3 | OUTPATIENT
Start: 2022-05-31

## 2022-05-31 RX ORDER — PRAVASTATIN SODIUM 40 MG
TABLET ORAL
Qty: 90 TABLET | Refills: 3 | OUTPATIENT
Start: 2022-05-31

## 2022-05-31 NOTE — TELEPHONE ENCOUNTER
Rahda Foley needs refill of   Requested Prescriptions     Pending Prescriptions Disp Refills    metFORMIN (GLUCOPHAGE-XR) 500 mg extended release tablet [Pharmacy Med Name: METFORMIN HCL ER TABS 500MG] 270 tablet 3     Sig: TAKE 3 TABLETS WITH SUPPER    lisinopril (PRINIVIL;ZESTRIL) 30 MG tablet [Pharmacy Med Name: LISINOPRIL TABS 30MG] 90 tablet 3     Sig: TAKE 1 TABLET DAILY    pravastatin (PRAVACHOL) 40 MG tablet [Pharmacy Med Name: PRAVASTATIN TABS 40MG] 90 tablet 3     Sig: TAKE 1 TABLET EVERY EVENING    glimepiride (AMARYL) 2 MG tablet [Pharmacy Med Name: GLIMEPIRIDE TABS 2MG] 90 tablet 3     Sig: TAKE 1 TABLET EVERY MORNING BEFORE BREAKFAST       Last Filled on:  6/15/2021,     Last Visit Date:  12/27/2021    Next Visit Date:    6/14/2022

## 2022-06-10 SDOH — HEALTH STABILITY: PHYSICAL HEALTH: ON AVERAGE, HOW MANY DAYS PER WEEK DO YOU ENGAGE IN MODERATE TO STRENUOUS EXERCISE (LIKE A BRISK WALK)?: 0 DAYS

## 2022-06-10 ASSESSMENT — PATIENT HEALTH QUESTIONNAIRE - PHQ9
SUM OF ALL RESPONSES TO PHQ QUESTIONS 1-9: 0
SUM OF ALL RESPONSES TO PHQ QUESTIONS 1-9: 0
2. FEELING DOWN, DEPRESSED OR HOPELESS: 0
SUM OF ALL RESPONSES TO PHQ QUESTIONS 1-9: 0
1. LITTLE INTEREST OR PLEASURE IN DOING THINGS: 0
SUM OF ALL RESPONSES TO PHQ9 QUESTIONS 1 & 2: 0
SUM OF ALL RESPONSES TO PHQ QUESTIONS 1-9: 0

## 2022-06-10 ASSESSMENT — LIFESTYLE VARIABLES: HOW OFTEN DO YOU HAVE A DRINK CONTAINING ALCOHOL: NEVER

## 2022-06-14 ASSESSMENT — LIFESTYLE VARIABLES
HOW OFTEN DO YOU HAVE SIX OR MORE DRINKS ON ONE OCCASION: 1
HOW OFTEN DO YOU HAVE A DRINK CONTAINING ALCOHOL: 1

## 2022-06-15 ENCOUNTER — OFFICE VISIT (OUTPATIENT)
Dept: FAMILY MEDICINE CLINIC | Age: 87
End: 2022-06-15
Payer: MEDICARE

## 2022-06-15 VITALS
HEART RATE: 88 BPM | SYSTOLIC BLOOD PRESSURE: 116 MMHG | HEIGHT: 58 IN | BODY MASS INDEX: 20.32 KG/M2 | WEIGHT: 96.8 LBS | RESPIRATION RATE: 16 BRPM | OXYGEN SATURATION: 97 % | DIASTOLIC BLOOD PRESSURE: 64 MMHG

## 2022-06-15 DIAGNOSIS — R80.9 PERSISTENT PROTEINURIA ASSOCIATED WITH TYPE 2 DIABETES MELLITUS (HCC): ICD-10-CM

## 2022-06-15 DIAGNOSIS — E78.00 HYPERCHOLESTEREMIA: ICD-10-CM

## 2022-06-15 DIAGNOSIS — E11.29 PERSISTENT PROTEINURIA ASSOCIATED WITH TYPE 2 DIABETES MELLITUS (HCC): ICD-10-CM

## 2022-06-15 DIAGNOSIS — E11.9 TYPE 2 DIABETES MELLITUS WITHOUT COMPLICATION, WITHOUT LONG-TERM CURRENT USE OF INSULIN (HCC): ICD-10-CM

## 2022-06-15 DIAGNOSIS — Z00.00 MEDICARE ANNUAL WELLNESS VISIT, SUBSEQUENT: Primary | ICD-10-CM

## 2022-06-15 DIAGNOSIS — R80.9 TYPE 2 DIABETES MELLITUS WITH MICROALBUMINURIA, WITHOUT LONG-TERM CURRENT USE OF INSULIN (HCC): ICD-10-CM

## 2022-06-15 DIAGNOSIS — E78.00 PURE HYPERCHOLESTEROLEMIA: ICD-10-CM

## 2022-06-15 DIAGNOSIS — E11.29 TYPE 2 DIABETES MELLITUS WITH MICROALBUMINURIA, WITHOUT LONG-TERM CURRENT USE OF INSULIN (HCC): ICD-10-CM

## 2022-06-15 LAB
ALBUMIN SERPL-MCNC: 4 G/DL (ref 3.5–5.1)
ALP BLD-CCNC: 95 U/L (ref 38–126)
ALT SERPL-CCNC: 10 U/L (ref 11–66)
ANION GAP SERPL CALCULATED.3IONS-SCNC: 10 MEQ/L (ref 8–16)
AST SERPL-CCNC: 21 U/L (ref 5–40)
AVERAGE GLUCOSE: 153 MG/DL (ref 70–126)
BASOPHILS # BLD: 0.5 %
BASOPHILS ABSOLUTE: 0 THOU/MM3 (ref 0–0.1)
BILIRUB SERPL-MCNC: 0.3 MG/DL (ref 0.3–1.2)
BUN BLDV-MCNC: 13 MG/DL (ref 7–22)
CALCIUM SERPL-MCNC: 9.9 MG/DL (ref 8.5–10.5)
CHLORIDE BLD-SCNC: 98 MEQ/L (ref 98–111)
CHOLESTEROL, TOTAL: 152 MG/DL (ref 100–199)
CO2: 28 MEQ/L (ref 23–33)
CREAT SERPL-MCNC: 0.8 MG/DL (ref 0.4–1.2)
CREATININE URINE POCT: 50
EOSINOPHIL # BLD: 0.9 %
EOSINOPHILS ABSOLUTE: 0.1 THOU/MM3 (ref 0–0.4)
ERYTHROCYTE [DISTWIDTH] IN BLOOD BY AUTOMATED COUNT: 11.9 % (ref 11.5–14.5)
ERYTHROCYTE [DISTWIDTH] IN BLOOD BY AUTOMATED COUNT: 41.9 FL (ref 35–45)
GFR SERPL CREATININE-BSD FRML MDRD: 67 ML/MIN/1.73M2
GLUCOSE BLD-MCNC: 91 MG/DL (ref 70–108)
HBA1C MFR BLD: 7.1 % (ref 4.4–6.4)
HCT VFR BLD CALC: 33.4 % (ref 37–47)
HDLC SERPL-MCNC: 47 MG/DL
HEMOGLOBIN: 10.9 GM/DL (ref 12–16)
IMMATURE GRANS (ABS): 0.03 THOU/MM3 (ref 0–0.07)
IMMATURE GRANULOCYTES: 0.4 %
LDL CHOLESTEROL CALCULATED: 86 MG/DL
LYMPHOCYTES # BLD: 16.8 %
LYMPHOCYTES ABSOLUTE: 1.2 THOU/MM3 (ref 1–4.8)
MCH RBC QN AUTO: 31.5 PG (ref 26–33)
MCHC RBC AUTO-ENTMCNC: 32.6 GM/DL (ref 32.2–35.5)
MCV RBC AUTO: 96.5 FL (ref 81–99)
MICROALBUMIN/CREAT 24H UR: 150 MG/G{CREAT}
MICROALBUMIN/CREAT UR-RTO: 300
MONOCYTES # BLD: 10 %
MONOCYTES ABSOLUTE: 0.7 THOU/MM3 (ref 0.4–1.3)
NUCLEATED RED BLOOD CELLS: 0 /100 WBC
PLATELET # BLD: 344 THOU/MM3 (ref 130–400)
PMV BLD AUTO: 9.9 FL (ref 9.4–12.4)
POTASSIUM SERPL-SCNC: 5.2 MEQ/L (ref 3.5–5.2)
RBC # BLD: 3.46 MILL/MM3 (ref 4.2–5.4)
SEG NEUTROPHILS: 71.4 %
SEGMENTED NEUTROPHILS ABSOLUTE COUNT: 5.3 THOU/MM3 (ref 1.8–7.7)
SODIUM BLD-SCNC: 136 MEQ/L (ref 135–145)
TOTAL PROTEIN: 7.7 G/DL (ref 6.1–8)
TRIGL SERPL-MCNC: 97 MG/DL (ref 0–199)
WBC # BLD: 7.4 THOU/MM3 (ref 4.8–10.8)

## 2022-06-15 PROCEDURE — 82044 UR ALBUMIN SEMIQUANTITATIVE: CPT | Performed by: FAMILY MEDICINE

## 2022-06-15 PROCEDURE — 1123F ACP DISCUSS/DSCN MKR DOCD: CPT | Performed by: FAMILY MEDICINE

## 2022-06-15 PROCEDURE — G0439 PPPS, SUBSEQ VISIT: HCPCS | Performed by: FAMILY MEDICINE

## 2022-06-15 RX ORDER — PRAVASTATIN SODIUM 40 MG
40 TABLET ORAL EVERY EVENING
Qty: 90 TABLET | Refills: 3 | Status: SHIPPED | OUTPATIENT
Start: 2022-06-15

## 2022-06-15 RX ORDER — LANCETS 28 GAUGE
1 EACH MISCELLANEOUS DAILY
Qty: 100 EACH | Refills: 3 | Status: SHIPPED | OUTPATIENT
Start: 2022-06-15

## 2022-06-15 RX ORDER — LISINOPRIL 30 MG/1
30 TABLET ORAL DAILY
Qty: 90 TABLET | Refills: 3 | Status: ON HOLD | OUTPATIENT
Start: 2022-06-15 | End: 2022-10-07 | Stop reason: HOSPADM

## 2022-06-15 RX ORDER — METFORMIN HYDROCHLORIDE 500 MG/1
TABLET, EXTENDED RELEASE ORAL
Qty: 270 TABLET | Refills: 3 | Status: ON HOLD | OUTPATIENT
Start: 2022-06-15 | End: 2022-10-02 | Stop reason: SDUPTHER

## 2022-06-15 RX ORDER — GLIMEPIRIDE 2 MG/1
2 TABLET ORAL
Qty: 90 TABLET | Refills: 3 | Status: ON HOLD | OUTPATIENT
Start: 2022-06-15 | End: 2022-10-02 | Stop reason: HOSPADM

## 2022-06-15 RX ORDER — BLOOD-GLUCOSE METER
1 KIT MISCELLANEOUS DAILY
Qty: 100 EACH | Refills: 3 | Status: SHIPPED | OUTPATIENT
Start: 2022-06-15

## 2022-06-15 SDOH — ECONOMIC STABILITY: FOOD INSECURITY: WITHIN THE PAST 12 MONTHS, THE FOOD YOU BOUGHT JUST DIDN'T LAST AND YOU DIDN'T HAVE MONEY TO GET MORE.: NEVER TRUE

## 2022-06-15 SDOH — ECONOMIC STABILITY: FOOD INSECURITY: WITHIN THE PAST 12 MONTHS, YOU WORRIED THAT YOUR FOOD WOULD RUN OUT BEFORE YOU GOT MONEY TO BUY MORE.: NEVER TRUE

## 2022-06-15 ASSESSMENT — PATIENT HEALTH QUESTIONNAIRE - PHQ9
2. FEELING DOWN, DEPRESSED OR HOPELESS: 0
1. LITTLE INTEREST OR PLEASURE IN DOING THINGS: 0
SUM OF ALL RESPONSES TO PHQ QUESTIONS 1-9: 0
SUM OF ALL RESPONSES TO PHQ QUESTIONS 1-9: 0
SUM OF ALL RESPONSES TO PHQ9 QUESTIONS 1 & 2: 0
SUM OF ALL RESPONSES TO PHQ QUESTIONS 1-9: 0
SUM OF ALL RESPONSES TO PHQ QUESTIONS 1-9: 0

## 2022-06-15 ASSESSMENT — SOCIAL DETERMINANTS OF HEALTH (SDOH): HOW HARD IS IT FOR YOU TO PAY FOR THE VERY BASICS LIKE FOOD, HOUSING, MEDICAL CARE, AND HEATING?: NOT HARD AT ALL

## 2022-06-15 NOTE — PATIENT INSTRUCTIONS
Personalized Preventive Plan for Arabella Melara - 6/15/2022  Medicare offers a range of preventive health benefits. Some of the tests and screenings are paid in full while other may be subject to a deductible, co-insurance, and/or copay. Some of these benefits include a comprehensive review of your medical history including lifestyle, illnesses that may run in your family, and various assessments and screenings as appropriate. After reviewing your medical record and screening and assessments performed today your provider may have ordered immunizations, labs, imaging, and/or referrals for you. A list of these orders (if applicable) as well as your Preventive Care list are included within your After Visit Summary for your review. Other Preventive Recommendations:    · A preventive eye exam performed by an eye specialist is recommended every 1-2 years to screen for glaucoma; cataracts, macular degeneration, and other eye disorders. · A preventive dental visit is recommended every 6 months. · Try to get at least 150 minutes of exercise per week or 10,000 steps per day on a pedometer . · Order or download the FREE \"Exercise & Physical Activity: Your Everyday Guide\" from [employer]. Call 6-353.550.3170 or search [employer] online. · You need 6863-2913 mg of calcium and 5952-1486 IU of vitamin D per day. It is possible to meet your calcium requirement with diet alone, but a vitamin D supplement is usually necessary to meet this goal.  · When exposed to the sun, use a sunscreen that protects against both UVA and UVB radiation with an SPF of 30 or greater. Reapply every 2 to 3 hours or after sweating, drying off with a towel, or swimming. · Always wear a seat belt when traveling in a car. Always wear a helmet when riding a bicycle or motorcycle. Patient Education        Well Visit, Over 72: Care Instructions  Overview     Well visits can help you stay healthy. Your doctor has checked your overall health and may have suggested ways to take good care of yourself. Your doctor also may have recommended tests. At home, you can help prevent illness withhealthy eating, regular exercise, and other steps. Follow-up care is a key part of your treatment and safety. Be sure to make and go to all appointments, and call your doctor if you are having problems. It's also a good idea to know your test results and keep alist of the medicines you take. How can you care for yourself at home? Get screening tests that you and your doctor decide on. Screening helps find diseases before any symptoms appear. Eat healthy foods. Choose fruits, vegetables, whole grains, protein, and low-fat dairy foods. Limit fat, especially saturated fat. Reduce salt in your diet. Limit alcohol. If you are a man, have no more than 2 drinks a day or 14 drinks a week. If you are a woman, have no more than 1 drink a day or 7 drinks a week. Since alcohol affects older adults differently, you may want to limit alcohol even more. Or you may not want to drink at all. Get at least 30 minutes of exercise on most days of the week. Walking is a good choice. You also may want to do other activities, such as running, swimming, cycling, or playing tennis or team sports. Reach and stay at a healthy weight. This will lower your risk for many problems, such as obesity, diabetes, heart disease, and high blood pressure. Do not smoke. Smoking can make health problems worse. If you need help quitting, talk to your doctor about stop-smoking programs and medicines. These can increase your chances of quitting for good. Care for your mental health. It is easy to get weighed down by worry and stress. Learn strategies to manage stress, like deep breathing and mindfulness, and stay connected with your family and community. If you find you often feel sad or hopeless, talk with your doctor. Treatment can help.   Talk to your doctor about whether you have any risk factors for sexually transmitted infections (STIs). You can help prevent STIs if you wait to have sex with a new partner (or partners) until you've each been tested for STIs. It also helps if you use condoms (male or female condoms) and if you limit your sex partners to one person who only has sex with you. Vaccines are available for some STIs. If you think you may have a problem with alcohol or drug use, talk to your doctor. This includes prescription medicines (such as amphetamines and opioids) and illegal drugs (such as cocaine and methamphetamine). Your doctor can help you figure out what type of treatment is best for you. Protect your skin from too much sun. When you're outdoors from 10 a.m. to 4 p.m., stay in the shade or cover up with clothing and a hat with a wide brim. Wear sunglasses that block UV rays. Even when it's cloudy, put broad-spectrum sunscreen (SPF 30 or higher) on any exposed skin. See a dentist one or two times a year for checkups and to have your teeth cleaned. Wear a seat belt in the car. When should you call for help? Watch closely for changes in your health, and be sure to contact your doctor if you have any problems or symptoms that concern you. Where can you learn more? Go to https://Ebook Gluejaime.healthChippmunkpartners. org and sign in to your [employer] account. Enter Q844 in the [facility] box to learn more about \"Well Visit, Over 65: Care Instructions. \"     If you do not have an account, please click on the \"Sign Up Now\" link. Current as of: October 6, 2021               Content Version: 13.2  © 6127-0550 Healthwise, Incorporated. Care instructions adapted under license by [facility] ([facility]). If you have questions about a medical condition or this instruction, always ask your healthcare professional. [address] any warranty or liability for your use of this information.

## 2022-06-15 NOTE — PROGRESS NOTES
Medicare Annual Wellness Visit    Maryjo Steven is here for Medicare AWV    Assessment & Plan   Medicare annual wellness visit, subsequent  Pure hypercholesterolemia  -     pravastatin (PRAVACHOL) 40 MG tablet; Take 1 tablet by mouth every evening, Disp-90 tablet, R-3Normal  -     Lipid Panel; Future  -     CBC with Auto Differential; Future  -     Comprehensive Metabolic Panel; Future  Hypercholesteremia  -     pravastatin (PRAVACHOL) 40 MG tablet; Take 1 tablet by mouth every evening, Disp-90 tablet, R-3Normal  Persistent proteinuria associated with type 2 diabetes mellitus (HCC)  -     lisinopril (PRINIVIL;ZESTRIL) 30 MG tablet; Take 1 tablet by mouth daily, Disp-90 tablet, R-3Normal  Type 2 diabetes mellitus with microalbuminuria, without long-term current use of insulin (HCC)  -     glimepiride (AMARYL) 2 MG tablet; Take 1 tablet by mouth every morning (before breakfast), Disp-90 tablet, R-3Normal  -     FreeStyle Lancets MISC; DAILY Starting Wed 6/15/2022, Disp-100 each, R-3, NormalDx: E11.29, check bs daily  -     CBC with Auto Differential; Future  -     Comprehensive Metabolic Panel; Future  -     Hemoglobin A1C; Future  -     POCT microalbumin  Type 2 diabetes mellitus without complication, without long-term current use of insulin (HCC)  -     blood glucose test strips (FREESTYLE LITE) strip; 1 each by In Vitro route daily Dx: E11.29, check BS daily, Disp-100 each, R-3Normal  -     metFORMIN (GLUCOPHAGE-XR) 500 MG extended release tablet; Take 3 tabs with supper, Disp-270 tablet, R-3Normal   Reviewed health maintenance     Recommendations for Preventive Services Due: see orders and patient instructions/AVS.  Recommended screening schedule for the next 5-10 years is provided to the patient in written form: see Patient Instructions/AVS.     Return for Medicare Annual Wellness Visit in 1 year.      Subjective       Patient's complete Health Risk Assessment and screening values have been reviewed and are found in [facility]. The following problems were reviewed today and where indicated follow up appointments were made and/or referrals ordered.     Positive Risk Factor Screenings with Interventions:    Fall Risk:  Do you feel unsteady or are you worried about falling? : (!) yes  2 or more falls in past year?: no  Fall with injury in past year?: (!) yes     Fall Risk Interventions:    · Home safety tips provided              General Health and ACP:  General  In general, how would you say your health is?: Fair  In the past 7 days, have you experienced any of the following: New or Increased Pain, New or Increased Fatigue, Loneliness, Social Isolation, Stress or Anger?: (!) Yes  Select all that apply: (!) New or Increased Fatigue  Do you get the social and emotional support that you need?: Yes  Do you have a Living Will?: (!) No    Advance Directives     Power of  Living Will ACP-Advance Directive ACP-Power of     Not on File Not on File Not on File Not on File      General Health Risk Interventions:  · Fatigue: labs ordered- routine labs, cbc,cmp, tsh, a1c  · No Living Will: Advance Care Planning addressed with patient today       ADLs:  In the past 7 days, did you need help from others to perform any of the following everyday activities: Eating, dressing, grooming, bathing, toileting, or walking/balance?: (!) Yes  Select all that apply: (!) Eating,Dressing,Grooming,Bathing,Toileting,Walking/Balance  In the past 7 days, did you need help from others to take care of any of the following: Laundry, housekeeping, banking/finances, shopping, telephone use, food preparation, transportation, or taking medications?: (!) Yes  Select all that apply: (!) Laundry,Housekeeping,Banking/Finances,Shopping,Telephone Use,Food Preparation,Transportation,Taking Medications    ADL Interventions:  · family helps with these          Objective   Vitals:    06/15/22 0933   BP: 116/64   Pulse: 88   Resp: 16   SpO2: 97%   Weight: 96 lb 12.8 oz (43.9 kg)   Height: 4' 10\" (1.473 m)      Body mass index is 20.23 kg/m². General Appearance: alert and oriented to person, place and time, well developed and well- nourished, in no acute distress  Skin: warm and dry, no rash or erythema, left side of face has a large skin cancer noted, she does not want it addressed/removed. Head: normocephalic and atraumatic  Eyes: pupils equal, round, and reactive to light, extraocular eye movements intact, conjunctivae normal  ENT: tympanic membrane, external ear and ear canal normal bilaterally, nose without deformity, nasal mucosa and turbinates normal without polyps  Neck: supple and non-tender without mass, no thyromegaly or thyroid nodules, no cervical lymphadenopathy  Pulmonary/Chest: clear to auscultation bilaterally- no wheezes, rales or rhonchi, normal air movement, no respiratory distress  Cardiovascular: normal rate, regular rhythm, normal S1 and S2, no murmurs, rubs, clicks, or gallops, distal pulses intact, no carotid bruits  Abdomen: soft, non-tender, non-distended, normal bowel sounds, no masses or organomegaly  Extremities: no cyanosis, clubbing or edema  Musculoskeletal: normal range of motion, no joint swelling, deformity or tenderness  Neurologic: reflexes normal and symmetric, no cranial nerve deficit, gait, coordination and speech normal   No open areas on the feet. Sensation intact. Allergies   Allergen Reactions    Pcn [Penicillins]      Prior to Visit Medications    Medication Sig Taking?  Authorizing Provider   pravastatin (PRAVACHOL) 40 MG tablet Take 1 tablet by mouth every evening Yes Earnest Fermin MD   lisinopril (PRINIVIL;ZESTRIL) 30 MG tablet Take 1 tablet by mouth daily Yes Earnest Fermin MD   glimepiride (AMARYL) 2 MG tablet Take 1 tablet by mouth every morning (before breakfast) Yes Earnest Fermin MD   blood glucose test strips (FREESTYLE LITE) strip 1 each by In Vitro route daily Dx: E11.29, check BS daily Yes Irena Cerda MD   FreeStyle Lancets MISC 1 each by Does not apply route daily Dx: E11.29, check bs daily Yes Irena Cerda MD   metFORMIN (GLUCOPHAGE-XR) 500 MG extended release tablet Take 3 tabs with supper Yes Irena Cerda MD   Misc. Devices [facility]) MISC R40.4, R53.1, H54.10 Yes Irena Cerda MD   aspirin EC 81 MG EC tablet Take 81 mg by mouth daily Yes Historical Provider, MD   multivitamin [facility]) per tablet Take 1 tablet by mouth daily. Yes Historical Provider, MD   Naproxen Sodium (ALEVE PO) Take  by mouth daily as needed. Yes Historical Provider, MD   Loratadine (CLARITIN PO) Take  by mouth as needed.    Yes Historical Provider, MD [place] (Including outside providers/suppliers regularly involved in providing care):   Patient Care Team:  Irena Cerda MD as PCP - General (Family Medicine)  Irena Cerda MD as PCP - [facility] Empaneled Provider     Reviewed and updated this visit:  Tobacco  Allergies  Meds  Problems  Med Hx  Surg Hx  Soc Hx  Fam Hx

## 2022-06-16 ENCOUNTER — TELEPHONE (OUTPATIENT)
Dept: FAMILY MEDICINE CLINIC | Age: 87
End: 2022-06-16

## 2022-06-16 DIAGNOSIS — E11.29 TYPE 2 DIABETES MELLITUS WITH MICROALBUMINURIA, WITHOUT LONG-TERM CURRENT USE OF INSULIN (HCC): Primary | ICD-10-CM

## 2022-06-16 DIAGNOSIS — R80.9 TYPE 2 DIABETES MELLITUS WITH MICROALBUMINURIA, WITHOUT LONG-TERM CURRENT USE OF INSULIN (HCC): Primary | ICD-10-CM

## 2022-06-16 NOTE — TELEPHONE ENCOUNTER
----- Message from Radha Dejesus MD sent at 6/16/2022  7:06 AM EDT -----  A1C is stable at 7.1. Recheck a1c in 6 months. CMP is normal.  Cholesterol at 152 with normal CRR. CBC is stable. Continue present.

## 2022-08-24 ENCOUNTER — OFFICE VISIT (OUTPATIENT)
Dept: FAMILY MEDICINE CLINIC | Age: 87
End: 2022-08-24
Payer: MEDICARE

## 2022-08-24 VITALS
DIASTOLIC BLOOD PRESSURE: 76 MMHG | RESPIRATION RATE: 16 BRPM | HEART RATE: 87 BPM | OXYGEN SATURATION: 98 % | TEMPERATURE: 97.2 F | SYSTOLIC BLOOD PRESSURE: 132 MMHG

## 2022-08-24 DIAGNOSIS — C44.300 CANCER OF SKIN OF FACE: Primary | ICD-10-CM

## 2022-08-24 DIAGNOSIS — E11.29 PERSISTENT PROTEINURIA ASSOCIATED WITH TYPE 2 DIABETES MELLITUS (HCC): ICD-10-CM

## 2022-08-24 DIAGNOSIS — R80.9 PERSISTENT PROTEINURIA ASSOCIATED WITH TYPE 2 DIABETES MELLITUS (HCC): ICD-10-CM

## 2022-08-24 PROCEDURE — 1123F ACP DISCUSS/DSCN MKR DOCD: CPT | Performed by: FAMILY MEDICINE

## 2022-08-24 PROCEDURE — 99214 OFFICE O/P EST MOD 30 MIN: CPT | Performed by: FAMILY MEDICINE

## 2022-08-24 PROCEDURE — G8420 CALC BMI NORM PARAMETERS: HCPCS | Performed by: FAMILY MEDICINE

## 2022-08-24 PROCEDURE — 1090F PRES/ABSN URINE INCON ASSESS: CPT | Performed by: FAMILY MEDICINE

## 2022-08-24 PROCEDURE — 1036F TOBACCO NON-USER: CPT | Performed by: FAMILY MEDICINE

## 2022-08-24 PROCEDURE — G8427 DOCREV CUR MEDS BY ELIG CLIN: HCPCS | Performed by: FAMILY MEDICINE

## 2022-08-24 PROCEDURE — 3051F HG A1C>EQUAL 7.0%<8.0%: CPT | Performed by: FAMILY MEDICINE

## 2022-08-24 ASSESSMENT — ENCOUNTER SYMPTOMS
WHEEZING: 0
ABDOMINAL PAIN: 0
COUGH: 0
BACK PAIN: 0
VOMITING: 0
DIARRHEA: 0
EYE PAIN: 0
NAUSEA: 0
BLOOD IN STOOL: 0
CONSTIPATION: 0
SORE THROAT: 0
RHINORRHEA: 0
SHORTNESS OF BREATH: 0
CHEST TIGHTNESS: 0

## 2022-08-24 NOTE — PROGRESS NOTES
Referral, OV notes, Pt summary, medication list, insurance card faxed to Dr Julia Yeh office [phone]

## 2022-08-24 NOTE — PROGRESS NOTES
Ben Alcazar (:  1926) is a 80 y.o. female,Established patient, here for evaluation of the following chief complaint(s):  Skin Lesion (Left side of face has doubled in size x 1 month, bleeds, sore)         ASSESSMENT/PLAN:  1. Cancer of skin of face  -     [facility] - Emperatriz Quinn MD, Dermatology, [facility]  -chronic condition, exacerbated, enlarging bleeding skin cancer, had declined treatment but now agreeable. Refer to derm for further evaluation and treatment. 2. Persistent proteinuria associated with type 2 diabetes mellitus (HCC)  -stable, controlled on lisinopril. No follow-ups on file. Subjective   SUBJECTIVE/OBJECTIVE:  HPI  Patient here today for a check up. Reviewed BMI of 20. Normal.  Enlarging skin cancer on the left cheek. Didn't want intervention, but now larger and bleeds. Willing to have it taken off. , nonsmoker,pmh reviewed. Review of Systems   Constitutional:  Negative for chills, fatigue, fever and unexpected weight change. HENT:  Negative for congestion, ear pain, rhinorrhea and sore throat. Eyes:  Negative for pain and visual disturbance. Respiratory:  Negative for cough, chest tightness, shortness of breath and wheezing. Cardiovascular:  Negative for chest pain and palpitations. Gastrointestinal:  Negative for abdominal pain, blood in stool, constipation, diarrhea, nausea and vomiting. Genitourinary:  Negative for difficulty urinating, frequency, hematuria and urgency. Musculoskeletal:  Negative for back pain, joint swelling, myalgias and neck pain. Skin:  Negative for rash. Neurological:  Negative for dizziness and headaches. Hematological:  Negative for adenopathy. Does not bruise/bleed easily. Psychiatric/Behavioral:  Negative for behavioral problems and sleep disturbance. The patient is not nervous/anxious. Objective   Physical Exam  Vitals and nursing note reviewed.    Constitutional:       Appearance: She is well-developed. HENT:      Head: Normocephalic and atraumatic. Comments: Large 3 x 3 cm raised skin cancer. Some bleeding     Right Ear: External ear normal.      Left Ear: External ear normal.      Nose: Nose normal.      Mouth/Throat:      Mouth: Mucous membranes are moist.   Eyes:      Pupils: Pupils are equal, round, and reactive to light. Neck:      Thyroid: No thyromegaly. Cardiovascular:      Rate and Rhythm: Normal rate and regular rhythm. Heart sounds: Normal heart sounds. Pulmonary:      Breath sounds: Normal breath sounds. No wheezing or rales. Abdominal:      General: Bowel sounds are normal.      Palpations: Abdomen is soft. Tenderness: There is no abdominal tenderness. There is no guarding or rebound. Musculoskeletal:         General: Normal range of motion. Cervical back: Neck supple. Lymphadenopathy:      Cervical: No cervical adenopathy. Skin:     General: Skin is warm and dry. Findings: No rash. Neurological:      Mental Status: She is alert and oriented to person, place, and time. Cranial Nerves: No cranial nerve deficit. Deep Tendon Reflexes: Reflexes are normal and symmetric. An electronic signature was used to authenticate this note.     --Oumou Keita MD

## 2022-09-22 ENCOUNTER — NURSE ONLY (OUTPATIENT)
Dept: FAMILY MEDICINE CLINIC | Age: 87
End: 2022-09-22
Payer: MEDICARE

## 2022-09-22 DIAGNOSIS — Z23 NEED FOR INFLUENZA VACCINATION: Primary | ICD-10-CM

## 2022-09-22 PROCEDURE — G0008 ADMIN INFLUENZA VIRUS VAC: HCPCS | Performed by: NURSE PRACTITIONER

## 2022-09-22 PROCEDURE — 90694 VACC AIIV4 NO PRSRV 0.5ML IM: CPT | Performed by: NURSE PRACTITIONER

## 2022-09-22 NOTE — PROGRESS NOTES
Immunizations Administered       Name Date Dose Route    Influenza, FLUAD, (age 72 y+), Adjuvanted, 0.5mL 9/22/2022 0.5 mL Intramuscular    Site: Deltoid- Right    Lot: [id number]    [facility]: [phone]

## 2022-09-29 ENCOUNTER — APPOINTMENT (OUTPATIENT)
Dept: CT IMAGING | Age: 87
DRG: 689 | End: 2022-09-29
Payer: MEDICARE

## 2022-09-29 ENCOUNTER — APPOINTMENT (OUTPATIENT)
Dept: ULTRASOUND IMAGING | Age: 87
DRG: 689 | End: 2022-09-29
Payer: MEDICARE

## 2022-09-29 ENCOUNTER — HOSPITAL ENCOUNTER (INPATIENT)
Age: 87
LOS: 1 days | Discharge: HOME | DRG: 689 | End: 2022-10-02
Attending: EMERGENCY MEDICINE | Admitting: INTERNAL MEDICINE
Payer: MEDICARE

## 2022-09-29 ENCOUNTER — APPOINTMENT (OUTPATIENT)
Dept: GENERAL RADIOLOGY | Age: 87
DRG: 689 | End: 2022-09-29
Payer: MEDICARE

## 2022-09-29 DIAGNOSIS — N30.00 ACUTE CYSTITIS WITHOUT HEMATURIA: ICD-10-CM

## 2022-09-29 DIAGNOSIS — R80.9 PERSISTENT PROTEINURIA ASSOCIATED WITH TYPE 2 DIABETES MELLITUS (HCC): ICD-10-CM

## 2022-09-29 DIAGNOSIS — E11.9 TYPE 2 DIABETES MELLITUS WITHOUT COMPLICATION, WITHOUT LONG-TERM CURRENT USE OF INSULIN (HCC): ICD-10-CM

## 2022-09-29 DIAGNOSIS — R41.82 ALTERED MENTAL STATUS, UNSPECIFIED ALTERED MENTAL STATUS TYPE: Primary | ICD-10-CM

## 2022-09-29 DIAGNOSIS — E11.29 PERSISTENT PROTEINURIA ASSOCIATED WITH TYPE 2 DIABETES MELLITUS (HCC): ICD-10-CM

## 2022-09-29 LAB
ALBUMIN SERPL-MCNC: 3.8 G/DL (ref 3.5–5.1)
ALP BLD-CCNC: 88 U/L (ref 38–126)
ALT SERPL-CCNC: 8 U/L (ref 11–66)
ANION GAP SERPL CALCULATED.3IONS-SCNC: 12 MEQ/L (ref 8–16)
APTT: 26 SECONDS (ref 22–38)
AST SERPL-CCNC: 24 U/L (ref 5–40)
BACTERIA: ABNORMAL /HPF
BASOPHILS # BLD: 0.2 %
BASOPHILS ABSOLUTE: 0 THOU/MM3 (ref 0–0.1)
BILIRUB SERPL-MCNC: 0.6 MG/DL (ref 0.3–1.2)
BILIRUBIN URINE: NEGATIVE
BLOOD, URINE: ABNORMAL
BUN BLDV-MCNC: 14 MG/DL (ref 7–22)
C-REACTIVE PROTEIN: 4.52 MG/DL (ref 0–1)
CALCIUM SERPL-MCNC: 9.5 MG/DL (ref 8.5–10.5)
CASTS 2: ABNORMAL /LPF
CASTS UA: ABNORMAL /LPF
CHARACTER, URINE: ABNORMAL
CHLORIDE BLD-SCNC: 95 MEQ/L (ref 98–111)
CHP ED QC CHECK: 137
CO2: 23 MEQ/L (ref 23–33)
COLOR: YELLOW
CREAT SERPL-MCNC: 1 MG/DL (ref 0.4–1.2)
CRYSTALS, UA: ABNORMAL
EKG ATRIAL RATE: 81 BPM
EKG ATRIAL RATE: 94 BPM
EKG P AXIS: 68 DEGREES
EKG P-R INTERVAL: 136 MS
EKG P-R INTERVAL: 178 MS
EKG Q-T INTERVAL: 352 MS
EKG Q-T INTERVAL: 392 MS
EKG QRS DURATION: 72 MS
EKG QRS DURATION: 74 MS
EKG QTC CALCULATION (BAZETT): 440 MS
EKG QTC CALCULATION (BAZETT): 455 MS
EKG R AXIS: 45 DEGREES
EKG R AXIS: 55 DEGREES
EKG T AXIS: 38 DEGREES
EKG T AXIS: 57 DEGREES
EKG VENTRICULAR RATE: 81 BPM
EKG VENTRICULAR RATE: 94 BPM
EOSINOPHIL # BLD: 0 %
EOSINOPHILS ABSOLUTE: 0 THOU/MM3 (ref 0–0.4)
EPITHELIAL CELLS, UA: ABNORMAL /HPF
ERYTHROCYTE [DISTWIDTH] IN BLOOD BY AUTOMATED COUNT: 12.9 % (ref 11.5–14.5)
ERYTHROCYTE [DISTWIDTH] IN BLOOD BY AUTOMATED COUNT: 45.7 FL (ref 35–45)
FLU A ANTIGEN: NEGATIVE
FLU B ANTIGEN: NEGATIVE
GFR SERPL CREATININE-BSD FRML MDRD: 51 ML/MIN/1.73M2
GLUCOSE BLD-MCNC: 114 MG/DL (ref 70–108)
GLUCOSE BLD-MCNC: 137 MG/DL
GLUCOSE BLD-MCNC: 137 MG/DL (ref 70–108)
GLUCOSE BLD-MCNC: 155 MG/DL (ref 70–108)
GLUCOSE BLD-MCNC: 160 MG/DL (ref 70–108)
GLUCOSE URINE: NEGATIVE MG/DL
HCT VFR BLD CALC: 30.6 % (ref 37–47)
HEMOGLOBIN: 10 GM/DL (ref 12–16)
IMMATURE GRANS (ABS): 0.19 THOU/MM3 (ref 0–0.07)
IMMATURE GRANULOCYTES: 1.1 %
INR BLD: 1.02 (ref 0.85–1.13)
KETONES, URINE: NEGATIVE
LACTIC ACID, SEPSIS: 1.5 MMOL/L (ref 0.5–1.9)
LACTIC ACID, SEPSIS: 2 MMOL/L (ref 0.5–1.9)
LEUKOCYTE ESTERASE, URINE: ABNORMAL
LYMPHOCYTES # BLD: 4.6 %
LYMPHOCYTES ABSOLUTE: 0.8 THOU/MM3 (ref 1–4.8)
MCH RBC QN AUTO: 31.8 PG (ref 26–33)
MCHC RBC AUTO-ENTMCNC: 32.7 GM/DL (ref 32.2–35.5)
MCV RBC AUTO: 97.5 FL (ref 81–99)
MISCELLANEOUS 2: ABNORMAL
MONOCYTES # BLD: 7.4 %
MONOCYTES ABSOLUTE: 1.3 THOU/MM3 (ref 0.4–1.3)
NITRITE, URINE: NEGATIVE
NUCLEATED RED BLOOD CELLS: 0 /100 WBC
OSMOLALITY CALCULATION: 264.4 MOSMOL/KG (ref 275–300)
PH UA: 7 (ref 5–9)
PLATELET # BLD: 261 THOU/MM3 (ref 130–400)
PMV BLD AUTO: 9.6 FL (ref 9.4–12.4)
POC CREATININE WHOLE BLOOD: 1.1 MG/DL (ref 0.5–1.2)
POTASSIUM REFLEX MAGNESIUM: 5.3 MEQ/L (ref 3.5–5.2)
PROTEIN UA: 30
RBC # BLD: 3.14 MILL/MM3 (ref 4.2–5.4)
RBC URINE: ABNORMAL /HPF
RENAL EPITHELIAL, UA: ABNORMAL
SARS-COV-2, NAAT: NOT  DETECTED
SEDIMENTATION RATE, ERYTHROCYTE: 70 MM/HR (ref 0–20)
SEG NEUTROPHILS: 86.7 %
SEGMENTED NEUTROPHILS ABSOLUTE COUNT: 15.5 THOU/MM3 (ref 1.8–7.7)
SODIUM BLD-SCNC: 130 MEQ/L (ref 135–145)
SPECIFIC GRAVITY, URINE: 1.01 (ref 1–1.03)
T4 FREE: 1.17 NG/DL (ref 0.93–1.76)
TOTAL PROTEIN: 7 G/DL (ref 6.1–8)
TROPONIN T: 0.04 NG/ML
TROPONIN T: 0.05 NG/ML
TSH SERPL DL<=0.05 MIU/L-ACNC: 6.14 UIU/ML (ref 0.4–4.2)
UROBILINOGEN, URINE: 0.2 EU/DL (ref 0–1)
WBC # BLD: 17.9 THOU/MM3 (ref 4.8–10.8)
WBC UA: > 200 /HPF
YEAST: ABNORMAL

## 2022-09-29 PROCEDURE — 85730 THROMBOPLASTIN TIME PARTIAL: CPT

## 2022-09-29 PROCEDURE — 70496 CT ANGIOGRAPHY HEAD: CPT

## 2022-09-29 PROCEDURE — 93005 ELECTROCARDIOGRAM TRACING: CPT | Performed by: EMERGENCY MEDICINE

## 2022-09-29 PROCEDURE — 85651 RBC SED RATE NONAUTOMATED: CPT

## 2022-09-29 PROCEDURE — 96361 HYDRATE IV INFUSION ADD-ON: CPT

## 2022-09-29 PROCEDURE — G0378 HOSPITAL OBSERVATION PER HR: HCPCS

## 2022-09-29 PROCEDURE — 96365 THER/PROPH/DIAG IV INF INIT: CPT

## 2022-09-29 PROCEDURE — 87635 SARS-COV-2 COVID-19 AMP PRB: CPT

## 2022-09-29 PROCEDURE — 85025 COMPLETE CBC W/AUTO DIFF WBC: CPT

## 2022-09-29 PROCEDURE — 93005 ELECTROCARDIOGRAM TRACING: CPT | Performed by: STUDENT IN AN ORGANIZED HEALTH CARE EDUCATION/TRAINING PROGRAM

## 2022-09-29 PROCEDURE — 87077 CULTURE AEROBIC IDENTIFY: CPT

## 2022-09-29 PROCEDURE — 84443 ASSAY THYROID STIM HORMONE: CPT

## 2022-09-29 PROCEDURE — 6360000002 HC RX W HCPCS

## 2022-09-29 PROCEDURE — 6370000000 HC RX 637 (ALT 250 FOR IP): Performed by: STUDENT IN AN ORGANIZED HEALTH CARE EDUCATION/TRAINING PROGRAM

## 2022-09-29 PROCEDURE — 82565 ASSAY OF CREATININE: CPT

## 2022-09-29 PROCEDURE — 2580000003 HC RX 258: Performed by: STUDENT IN AN ORGANIZED HEALTH CARE EDUCATION/TRAINING PROGRAM

## 2022-09-29 PROCEDURE — 93010 ELECTROCARDIOGRAM REPORT: CPT | Performed by: NUCLEAR MEDICINE

## 2022-09-29 PROCEDURE — 36415 COLL VENOUS BLD VENIPUNCTURE: CPT

## 2022-09-29 PROCEDURE — 80053 COMPREHEN METABOLIC PANEL: CPT

## 2022-09-29 PROCEDURE — 87804 INFLUENZA ASSAY W/OPTIC: CPT

## 2022-09-29 PROCEDURE — 2580000003 HC RX 258

## 2022-09-29 PROCEDURE — 6360000002 HC RX W HCPCS: Performed by: STUDENT IN AN ORGANIZED HEALTH CARE EDUCATION/TRAINING PROGRAM

## 2022-09-29 PROCEDURE — 99285 EMERGENCY DEPT VISIT HI MDM: CPT

## 2022-09-29 PROCEDURE — 70450 CT HEAD/BRAIN W/O DYE: CPT

## 2022-09-29 PROCEDURE — 81001 URINALYSIS AUTO W/SCOPE: CPT

## 2022-09-29 PROCEDURE — 84484 ASSAY OF TROPONIN QUANT: CPT

## 2022-09-29 PROCEDURE — 99222 1ST HOSP IP/OBS MODERATE 55: CPT | Performed by: PHYSICIAN ASSISTANT

## 2022-09-29 PROCEDURE — 85610 PROTHROMBIN TIME: CPT

## 2022-09-29 PROCEDURE — 70498 CT ANGIOGRAPHY NECK: CPT

## 2022-09-29 PROCEDURE — 84439 ASSAY OF FREE THYROXINE: CPT

## 2022-09-29 PROCEDURE — 83605 ASSAY OF LACTIC ACID: CPT

## 2022-09-29 PROCEDURE — 6360000004 HC RX CONTRAST MEDICATION: Performed by: EMERGENCY MEDICINE

## 2022-09-29 PROCEDURE — 82948 REAGENT STRIP/BLOOD GLUCOSE: CPT

## 2022-09-29 PROCEDURE — 71045 X-RAY EXAM CHEST 1 VIEW: CPT

## 2022-09-29 PROCEDURE — 87086 URINE CULTURE/COLONY COUNT: CPT

## 2022-09-29 PROCEDURE — 96375 TX/PRO/DX INJ NEW DRUG ADDON: CPT

## 2022-09-29 PROCEDURE — 76770 US EXAM ABDO BACK WALL COMP: CPT

## 2022-09-29 PROCEDURE — 86140 C-REACTIVE PROTEIN: CPT

## 2022-09-29 PROCEDURE — 87040 BLOOD CULTURE FOR BACTERIA: CPT

## 2022-09-29 PROCEDURE — 87186 SC STD MICRODIL/AGAR DIL: CPT

## 2022-09-29 RX ORDER — SODIUM CHLORIDE 9 MG/ML
INJECTION, SOLUTION INTRAVENOUS CONTINUOUS
Status: ACTIVE | OUTPATIENT
Start: 2022-09-29 | End: 2022-09-30

## 2022-09-29 RX ORDER — SODIUM CHLORIDE 0.9 % (FLUSH) 0.9 %
5-40 SYRINGE (ML) INJECTION PRN
Status: DISCONTINUED | OUTPATIENT
Start: 2022-09-29 | End: 2022-10-02 | Stop reason: HOSPADM

## 2022-09-29 RX ORDER — ACETAMINOPHEN 650 MG/1
650 SUPPOSITORY RECTAL EVERY 6 HOURS PRN
Status: DISCONTINUED | OUTPATIENT
Start: 2022-09-29 | End: 2022-10-02 | Stop reason: HOSPADM

## 2022-09-29 RX ORDER — INSULIN LISPRO 100 [IU]/ML
0-4 INJECTION, SOLUTION INTRAVENOUS; SUBCUTANEOUS
Status: DISCONTINUED | OUTPATIENT
Start: 2022-09-29 | End: 2022-09-30

## 2022-09-29 RX ORDER — DEXTROSE MONOHYDRATE 100 MG/ML
INJECTION, SOLUTION INTRAVENOUS CONTINUOUS PRN
Status: DISCONTINUED | OUTPATIENT
Start: 2022-09-29 | End: 2022-10-02 | Stop reason: HOSPADM

## 2022-09-29 RX ORDER — ONDANSETRON 2 MG/ML
4 INJECTION INTRAMUSCULAR; INTRAVENOUS EVERY 6 HOURS PRN
Status: DISCONTINUED | OUTPATIENT
Start: 2022-09-29 | End: 2022-10-02 | Stop reason: HOSPADM

## 2022-09-29 RX ORDER — SODIUM CHLORIDE 0.9 % (FLUSH) 0.9 %
5-40 SYRINGE (ML) INJECTION EVERY 12 HOURS SCHEDULED
Status: DISCONTINUED | OUTPATIENT
Start: 2022-09-29 | End: 2022-10-02 | Stop reason: HOSPADM

## 2022-09-29 RX ORDER — 0.9 % SODIUM CHLORIDE 0.9 %
500 INTRAVENOUS SOLUTION INTRAVENOUS ONCE
Status: COMPLETED | OUTPATIENT
Start: 2022-09-29 | End: 2022-09-29

## 2022-09-29 RX ORDER — GLIPIZIDE 5 MG/1
5 TABLET ORAL
Refills: 3 | Status: DISCONTINUED | OUTPATIENT
Start: 2022-09-30 | End: 2022-09-30

## 2022-09-29 RX ORDER — INSULIN LISPRO 100 [IU]/ML
0-4 INJECTION, SOLUTION INTRAVENOUS; SUBCUTANEOUS NIGHTLY
Status: DISCONTINUED | OUTPATIENT
Start: 2022-09-29 | End: 2022-09-30

## 2022-09-29 RX ORDER — HEPARIN SODIUM 5000 [USP'U]/ML
5000 INJECTION, SOLUTION INTRAVENOUS; SUBCUTANEOUS 2 TIMES DAILY
Status: DISCONTINUED | OUTPATIENT
Start: 2022-09-29 | End: 2022-10-02 | Stop reason: HOSPADM

## 2022-09-29 RX ORDER — SODIUM CHLORIDE 9 MG/ML
INJECTION, SOLUTION INTRAVENOUS PRN
Status: DISCONTINUED | OUTPATIENT
Start: 2022-09-29 | End: 2022-10-02 | Stop reason: HOSPADM

## 2022-09-29 RX ORDER — POLYETHYLENE GLYCOL 3350 17 G/17G
17 POWDER, FOR SOLUTION ORAL DAILY PRN
Status: DISCONTINUED | OUTPATIENT
Start: 2022-09-29 | End: 2022-10-02 | Stop reason: HOSPADM

## 2022-09-29 RX ORDER — ACETAMINOPHEN 325 MG/1
650 TABLET ORAL EVERY 6 HOURS PRN
Status: DISCONTINUED | OUTPATIENT
Start: 2022-09-29 | End: 2022-10-02 | Stop reason: HOSPADM

## 2022-09-29 RX ORDER — PRAVASTATIN SODIUM 40 MG
40 TABLET ORAL EVERY EVENING
Status: DISCONTINUED | OUTPATIENT
Start: 2022-09-29 | End: 2022-10-02 | Stop reason: HOSPADM

## 2022-09-29 RX ORDER — ONDANSETRON 4 MG/1
4 TABLET, ORALLY DISINTEGRATING ORAL EVERY 8 HOURS PRN
Status: DISCONTINUED | OUTPATIENT
Start: 2022-09-29 | End: 2022-10-02 | Stop reason: HOSPADM

## 2022-09-29 RX ORDER — ASPIRIN 81 MG/1
81 TABLET ORAL DAILY
Status: DISCONTINUED | OUTPATIENT
Start: 2022-09-30 | End: 2022-10-02 | Stop reason: HOSPADM

## 2022-09-29 RX ADMIN — SODIUM CHLORIDE 500 ML: 9 INJECTION, SOLUTION INTRAVENOUS at 14:02

## 2022-09-29 RX ADMIN — PRAVASTATIN SODIUM 40 MG: 40 TABLET ORAL at 18:37

## 2022-09-29 RX ADMIN — SODIUM CHLORIDE, PRESERVATIVE FREE 10 ML: 5 INJECTION INTRAVENOUS at 22:38

## 2022-09-29 RX ADMIN — LISINOPRIL 30 MG: 20 TABLET ORAL at 18:38

## 2022-09-29 RX ADMIN — ACETAMINOPHEN 650 MG: 325 TABLET ORAL at 23:07

## 2022-09-29 RX ADMIN — HEPARIN SODIUM 5000 UNITS: 5000 INJECTION INTRAVENOUS; SUBCUTANEOUS at 22:37

## 2022-09-29 RX ADMIN — CEFTRIAXONE SODIUM 1000 MG: 1 INJECTION, POWDER, FOR SOLUTION INTRAMUSCULAR; INTRAVENOUS at 14:03

## 2022-09-29 RX ADMIN — SODIUM CHLORIDE: 9 INJECTION, SOLUTION INTRAVENOUS at 15:45

## 2022-09-29 RX ADMIN — IOPAMIDOL 80 ML: 755 INJECTION, SOLUTION INTRAVENOUS at 11:28

## 2022-09-29 ASSESSMENT — PAIN - FUNCTIONAL ASSESSMENT
PAIN_FUNCTIONAL_ASSESSMENT: ACTIVITIES ARE NOT PREVENTED
PAIN_FUNCTIONAL_ASSESSMENT: NONE - DENIES PAIN

## 2022-09-29 ASSESSMENT — PAIN SCALES - GENERAL
PAINLEVEL_OUTOF10: 3
PAINLEVEL_OUTOF10: 0

## 2022-09-29 ASSESSMENT — PAIN SCALES - WONG BAKER: WONGBAKER_NUMERICALRESPONSE: 0

## 2022-09-29 ASSESSMENT — PAIN DESCRIPTION - LOCATION: LOCATION: GENERALIZED

## 2022-09-29 ASSESSMENT — PAIN DESCRIPTION - DESCRIPTORS: DESCRIPTORS: ACHING

## 2022-09-29 NOTE — ED NOTES
Pt to ER via ems for altered mental status. It is reported by son who lives with patient that she was her normal self when they went to bed around 0030 this morning. Son states he has a camera in her room. He woke up to her coughing. He went in to check on mother and noticed her speech was not right, but he thought maybe she was having some leg pain the way she was grabbing at her leg. He gave patient tylenol for the pain. Son reports patient could hardly swallow the pills and seemed to choke on the water when trying to get them down. Once patient calmed down from her choking spell, pt son reports patient was able to fall back asleep. This morning when she woke up, son noticed the pt seemed even more confused and her speech was worse. Upon arrival, pt had notable dysarthria and aphasia. Pt is bilaterally weak. Weakness is not worse on either side. No facial droop noted. Charge nurse and doctor called to bedside for code stroke.       Trevon Amezquita RN  09/29/22 [id number]

## 2022-09-29 NOTE — ED NOTES
Pt back to ER room 4 from ct scan at this time. Dr. Virginie Ho updating family at bedside.      Aylin Holguin RN  09/29/22 [id number]

## 2022-09-29 NOTE — PROGRESS NOTES
Pt admitted to  [facility] from ED and via cart/stretcher. Complaints: altered MS. IV normal saline infusing into the forearm right, condition patent and no redness at a rate of 100 mls/ hour with about 700 mls in the bag still. IV site free of s/s of infection or infiltration. Vital signs obtained. Assessment and data collection initiated. Two nurse skin assessment performed by Frida Pandey and Orlin Monson RN. Oriented to room. Policies and procedures for [facility] explained. Yennifer Mccabe RN discussed hourly rounding with patient addressing 5 P's. Fall prevention and safety brochure discussed with patient. Bed alarm on. Call light in reach. The best day to schedule a follow up Dr appointment is:  Monday and Tuesday a.m. Explained patients right to have family, representative or physician notified of their admission. Family at bedside. All questions answered with no further questions at this time.

## 2022-09-29 NOTE — ED PROVIDER NOTES
[place] ENCOUNTER          Pt Name: Emeli Brown  MRN: [id number]  [place] 6/6/1926  Date of evaluation: 9/29/2022  Treating Resident Physician: Chino López MD  Supervising Physician: Ardie Gaucher, MD    History obtained from child, chart review, and the patient. CODE STROKE   Activation By:  ED  Arrived By: EMS    NIHSS, POCT glucose, vitals including weight obtained in the ED before transfer to CT. The [facility] Team met the patient on arrival to CT. Last Known Well: 12:30am 9/29/2022  Initial NIHSS: 5 (from Screenings)  NIH Stroke Scale  Interval: Baseline  Level of Consciousness (1a): Not alert, but arousable by minor stimulation to obey  LOC Questions (1b): Answers one correctly  LOC Commands (1c): Performs both tasks correctly  Best Gaze (2): Normal  Visual (3): No visual loss  Facial Palsy (4): Normal symmetrical movement  Motor Arm, Left (5a): No drift  Motor Arm, Right (5b): No drift  Motor Leg, Left (6a): No drift  Motor Leg, Right (6b): No drift  Limb Ataxia (7): Absent  Sensory (8): Normal  Best Language (9): Severe aphasia  Dysarthria (10): Mild to moderate, slurs some words  Extinction and Inattention (11): No abnormality  Total: 5    POCT Glucose: 137 mg/dl    CT Head Without Contrast: No acute intracranial hemorrhage    ECG, IV inserted, and blood obtained between CT non-contrast and CTA. ECG: Normal sinus rhythm, no ischemic changes. CTA Head and Neck: No large vessel occlusion. Thrombolysis:   The patient was not a candidate for thrombolysis due to being outside the timeframe for tenecteplase with last known well approximately 11 hours prior to arrival.       Endovascular Intervention: Does not meet inclusion criteria. Patient had a chest x-ray on the way back to ED. Initial Assessment & Plan:  Patient presenting to emergency department with new onset confusion, cough as reported by patient's son. States the patient lives at home with her son and she is normally alert and oriented x3. She is confused today alert and oriented to person only. Attending Neurointerventionalist: Dr. Lonny Knott       References:  2019 AHA/ASA Guideline on Management of Acute Ischemic Stroke Update  2018 AHA/ASA Guidelines for Management of Acute Ischemic Stroke  2018 ACEP Clinical Policy: Use of Acute TPA for the Management of Acute Ischemic Stroke in the Emergency Department        CHIEF COMPLAINT       Chief Complaint   Patient presents with    Altered Mental Status             HISTORY OF PRESENT ILLNESS        Shantelle Nagel is a 80 y.o. female who presents to the emergency department for evaluation of altered mental status, confusion. Patient lives with her son who reported the patient was last well at 12:30 AM today. Stated that she woke up in the night cold clammy approximately 3 AM and he gave her Tylenol which she was able to take and started coughing choked. She was able to swallow and breathe okay. Patient awoke this morning and was having more confusion and was noted to have some discoordination of her arms. The patient's son stated that the patient has not been able to walk for the last year still has movement in her lower extremities. The son denied any recent falls of the patient. On presentation to the emergency department code stroke was activated for patient's confusion and dysarthria. The patient has no other acute complaints at this time. Review of Systems   Reason unable to perform ROS: Altered mental status, patient is only alert and oriented to self.          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 [address]     Varicose veins      No past surgical history on file. MEDICATIONS     Current Facility-Administered Medications:     0.9 % sodium chloride bolus, 500 mL, IntraVENous, Once, Jamison Garcia MD    cefTRIAXone (ROCEPHIN) 1,000 mg in dextrose 5 % 50 mL IVPB mini-bag, 1,000 mg, IntraVENous, Once, Jamison Garcia MD    Current Outpatient Medications:     pravastatin (PRAVACHOL) 40 MG tablet, Take 1 tablet by mouth every evening, Disp: 90 tablet, Rfl: 3    lisinopril (PRINIVIL;ZESTRIL) 30 MG tablet, Take 1 tablet by mouth daily, Disp: 90 tablet, Rfl: 3    glimepiride (AMARYL) 2 MG tablet, Take 1 tablet by mouth every morning (before breakfast), Disp: 90 tablet, Rfl: 3    blood glucose test strips (FREESTYLE LITE) strip, 1 each by In Vitro route daily Dx: E11.29, check BS daily, Disp: 100 each, Rfl: 3    FreeStyle Lancets MISC, 1 each by Does not apply route daily Dx: E11.29, check bs daily, Disp: 100 each, Rfl: 3    metFORMIN (GLUCOPHAGE-XR) 500 MG extended release tablet, Take 3 tabs with supper, Disp: 270 tablet, Rfl: 3    Misc. Devices (WHEELCHAIR) MISC, R40.4, R53.1, H54.10, Disp: 1 each, Rfl: 0    aspirin EC 81 MG EC tablet, Take 81 mg by mouth daily, Disp: , Rfl:     multivitamin (THERAGRAN) per tablet, Take 1 tablet by mouth daily. , Disp: , Rfl:     Naproxen Sodium (ALEVE PO), Take  by mouth daily as needed. , Disp: , Rfl:     Loratadine (CLARITIN PO), Take  by mouth as needed.   , Disp: , Rfl:       SOCIAL HISTORY     Social History     Social History Narrative    Not on file     Social History     Tobacco Use    Smoking status: Never    Smokeless tobacco: Never   Vaping Use    Vaping Use: Never used   Substance Use Topics    Alcohol use: No    Drug use: No         ALLERGIES     Allergies   Allergen Reactions    Pcn [Penicillins]          FAMILY HISTORY     Family History   Problem Relation Age of Onset    Stroke Mother          PREVIOUS RECORDS   Previous records reviewed: Patient last seen in the office 9/22/2022 for flu shot.        PHYSICAL EXAM     ED Triage Vitals   BP Temp Temp src Pulse Resp SpO2 Height Weight   -- -- -- -- -- -- -- --     Initial vital signs and nursing assessment reviewed and abnormal from hypertensive . Body mass index is 20.06 kg/m². Pulsoximetry is normal per my interpretation. Additional Vital Signs:  Vitals:    09/29/22 1125   BP: (!) 142/64   Pulse: 99   Resp: 24   Temp:    SpO2: 97%       Physical Exam  Constitutional:       Appearance: She is ill-appearing. HENT:      Head: Normocephalic and atraumatic. Eyes:      General: No visual field deficit. Extraocular Movements: Extraocular movements intact. Pupils: Pupils are equal, round, and reactive to light. Cardiovascular:      Rate and Rhythm: Normal rate and regular rhythm. Heart sounds: Normal heart sounds. No murmur heard. Pulmonary:      Effort: Pulmonary effort is normal. No respiratory distress. Breath sounds: Normal breath sounds. Abdominal:      General: Bowel sounds are normal. There is no distension. Palpations: Abdomen is soft. Tenderness: There is no abdominal tenderness. Musculoskeletal:      Cervical back: Normal range of motion. No rigidity. Skin:     General: Skin is warm and dry. Capillary Refill: Capillary refill takes less than 2 seconds. Neurological:      Mental Status: She is alert. GCS: GCS eye subscore is 4. GCS verbal subscore is 5. GCS motor subscore is 6. Cranial Nerves: No cranial nerve deficit, dysarthria or facial asymmetry. Sensory: No sensory deficit. Motor: No weakness. Psychiatric:         Behavior: Behavior normal.         Cognition and Memory: Cognition is impaired. MEDICAL DECISION MAKING   Initial Assessment:   Patient is 77-year-old female presenting to emergency department for altered mental status with concern for possible stroke.   Head CT negative for acute bleed, no ischemic stroke visualized, no large vessel occlusions All other components within normal limits   LACTATE, SEPSIS - Abnormal; Notable for the following components:    Lactic Acid, Sepsis 2.0 (*)     All other components within normal limits   GLOMERULAR FILTRATION RATE, ESTIMATED - Abnormal; Notable for the following components:    Est, Glom Filt Rate 51 (*)     All other components within normal limits   OSMOLALITY - Abnormal; Notable for the following components:    Osmolality Calc 264.4 (*)     All other components within normal limits   URINE WITH REFLEXED MICRO - Abnormal; Notable for the following components:    Blood, Urine SMALL (*)     Protein, UA 30 (*)     Leukocyte Esterase, Urine LARGE (*)     Character, Urine CLOUDY (*)     All other components within normal limits   POCT GLUCOSE - Abnormal; Notable for the following components:    POC Glucose 137 (*)     All other components within normal limits   POCT GLUCOSE - Normal   COVID-19, RAPID   RAPID INFLUENZA A/B ANTIGENS   CULTURE, BLOOD 1   CULTURE, BLOOD 2   CULTURE, REFLEXED, URINE   PROTIME-INR   APTT   ANION GAP   LACTATE, SEPSIS   POCT CREATININE       Radiologic studies results:  XR CHEST PORTABLE   Final Result   1. 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 Kyphotic positioning of the patient. 2. Normal heart size. Moderately ectatic aortic arch. 2. Small noncalcified nodular density right midlung laterally, 9 mm in diameter, possibly a noncalcified granuloma. If further evaluation is desired, CT thorax would be helpful. **This report has been created using voice recognition software. It may contain minor errors which are inherent in voice recognition technology. **      Final report electronically signed by Dr. oNmi Babin on 9/29/2022 11:39 AM      CTA HEAD W WO CONTRAST (CODE STROKE)   Final Result       1. Mural calcification in the clinoid segments of internal carotid arteries with moderate stenosis.    2. Calcified mural plaque at the bilateral carotid bifurcations with tandem lesion in the left proximal internal carotid artery with 37% stenosis. There is no hemodynamic likely significant stenosis on the right by NASCET criteria. **This report has been created using voice recognition software. It may contain minor errors which are inherent in voice recognition technology. **      Final report electronically signed by Dr. Jennifer Hill MD on 9/29/2022 11:55 AM      CTA NECK W WO CONTRAST (CODE STROKE)   Final Result       1. Mural calcification in the clinoid segments of internal carotid arteries with moderate stenosis. 2. Calcified mural plaque at the bilateral carotid bifurcations with tandem lesion in the left proximal internal carotid artery with 37% stenosis. There is no hemodynamic likely significant stenosis on the right by NASCET criteria. **This report has been created using voice recognition software. It may contain minor errors which are inherent in voice recognition technology. **      Final report electronically signed by Dr. Jennifer Hill MD on 9/29/2022 11:55 AM      CT HEAD WO CONTRAST   Final Result   1. No acute intracranial abnormality. The finding(s) was called to Dr. Kasandra Hobson at 1127 hours on 9/29/2022 by Dr. Henrietta Gonzales. Verbal acknowledgment and readback was given. **This report has been created using voice recognition software. It may contain minor errors which are inherent in voice recognition technology. **      Final report electronically signed by Dr Hanna Garcia on 9/29/2022 11:28 AM          ED Medications administered this visit:   Medications   0.9 % sodium chloride bolus (has no administration in time range)   cefTRIAXone (ROCEPHIN) 1,000 mg in dextrose 5 % 50 mL IVPB mini-bag (has no administration in time range)   iopamidol (ISOVUE-370) 76 % injection 80 mL (80 mLs IntraVENous Given 9/29/22 1128)         ED COURSE            MEDICATION CHANGES     New Prescriptions    No medications on file         FINAL DISPOSITION     Final diagnoses: Altered mental status, unspecified altered mental status type   Acute cystitis without hematuria     Condition: condition: stable  Dispo: Admit to hospitalist.      This transcription was electronically signed. Parts of this transcriptions may have been dictated by use of voice recognition software and electronically transcribed, and parts may have been transcribed with the assistance of an ED scribe. The transcription may contain errors not detected in proofreading. Please refer to my supervising physician's documentation if my documentation differs. Electronically Signed:  Loren Negron MD, 09/29/22, 1:43 PM         Loren Negron MD  Resident  09/29/22 [id number]

## 2022-09-29 NOTE — H&P
Hospitalist - History & Physical      Patient: Anat Coker    Unit/Bed:04/004A  YOB: 1926  MRN: [id number]   Acct: [de-identified]   PCP: Jair Luna MD    Date of Service: Pt seen/examined on 09/29/22  and Admitted to Observation with expected LOS less than two midnights due to medical therapy. Chief Complaint:  confusion      History Of Present Illness:    79 yo F with history NIDDMII, HLD, and recently noted left facial mole undergoing work up for possible malignancy, brought in to [facility] ED per son due to notable acute confusion overnight. ED: afebrile, hemodynamically stable but notable for tachypnea RR 28, saturating well on RA. NIHSS 5. STROKE ALERT called due to confusion. POC Gluc 137. CT head wo contrast with no acute abnormality. CTA head/neck showed moderate internal carotid artery stenosis, otherwise no significant stenosis. Labs notable for Na 130, K 5.3, Cl 5.3, BUN 14, Cr 1.0 with eGFR 51 (previous 0.8 on 06/2022), WBC 17.9, Hb 10. Given 1L IVNS bolus with Rocephin x1 dose. On-call interventional neurologist team recommended MRI for further evaluation. Admitted to hospitalist team for further evaluation/management. On evaluation, per patient's two sons present at bedside, patient was at her baseline until around 3AM when son checked on her, gave some water with Tylenol, and noted her to cough. Patient was restless and did not sleep well since then. Son also noted her to be confused and not doing things right as her routine this morning which prompted this ED visit. Son states, she was unable to recognize him and his brother, unable to tell her birth date, and did not know how to wear her dentures, all of which is not her normal. Has been at baseline recently. No recent URI, complaints of chest pain, dyspnea, change in activity level, diarrhea, dysuria. Had a UTI last year and had a fall sustaining back back injury ~1.5 year.  Has had a \"foggy mind\" since then per son but all symptoms since this morning has been new. No prior stroke or MI. Lifetime nonsmoker, no alcohol or IVDU history. Lives with son. Patient reports no symptoms including subjective fever, nausea, chest pain, dyspnea, dizziness, abdominal pain, dysuria. Oriented to self, place, and person now. Knows son brought her in to the hospital but unable to recall details. Knows birth date and recognized sons at bedside. Past Medical History:        Diagnosis Date    Broken leg 1982    Lt    Controlled type 2 diabetes mellitus with microalbuminuria, without long-term current use of insulin (HCC)     Hyperlipidemia     Persistent proteinuria associated with type 2 diabetes mellitus ([facility] [address]     Varicose veins        Past Surgical History:  No past surgical history on file. Home Medications:   No current facility-administered medications on file prior to encounter. Current Outpatient Medications on File Prior to Encounter   Medication Sig Dispense Refill    pravastatin (PRAVACHOL) 40 MG tablet Take 1 tablet by mouth every evening 90 tablet 3    lisinopril (PRINIVIL;ZESTRIL) 30 MG tablet Take 1 tablet by mouth daily 90 tablet 3    glimepiride (AMARYL) 2 MG tablet Take 1 tablet by mouth every morning (before breakfast) 90 tablet 3    blood glucose test strips (FREESTYLE LITE) strip 1 each by In Vitro route daily Dx: E11.29, check BS daily 100 each 3    FreeStyle Lancets MISC 1 each by Does not apply route daily Dx: E11.29, check bs daily 100 each 3    metFORMIN (GLUCOPHAGE-XR) 500 MG extended release tablet Take 3 tabs with supper 270 tablet 3    Misc. Devices ([facility]) MISC R40.4, R53.1, H54.10 1 each 0    aspirin EC 81 MG EC tablet Take 81 mg by mouth daily      multivitamin (THERAGRAN) per tablet Take 1 tablet by mouth daily. Naproxen Sodium (ALEVE PO) Take  by mouth daily as needed. Loratadine (CLARITIN PO) Take  by mouth as needed.            Allergies:    Pcn [penicillins]    Social History: reports that she has never smoked. She has never used smokeless tobacco. She reports that she does not drink alcohol and does not use drugs. Family History:       Problem Relation Age of Onset    Stroke Mother        Review of systems:   Pertinent positives as noted in the HPI. All other systems reviewed and negative. EKG: NSR    PHYSICAL EXAM:  BP (!) 140/57   Pulse 83   Temp 98.3 °F (36.8 °C) (Oral)   Resp 15   Wt 96 lb (43.5 kg)   SpO2 96%   BMI 20.06 kg/m²   General appearance: No apparent distress, appears stated age and cooperative. ~1cm left facial scabbed lesion- s/p biopsy of mole with concern for malignancy. HEENT: Normal cephalic, atraumatic without obvious deformity. EOMI. Extra ocular muscles intact. Conjunctivae/corneas clear. Dry mucosal membranes  Neck: Supple, with full range of motion. No jugular venous distention. Respiratory:  Normal respiratory effort. Clear to auscultation, bilaterally without Rales/Wheezes/Rhonchi. Cardiovascular: Regular rate and rhythm with normal S1/S2 without murmurs, rubs or gallops. Abdomen/Genitourinary: Soft, mild tenderness in RUQ/lateral/back (per son, chronic since fall), non-distended with normal bowel sounds. +suprapubic tenderness  Musculoskeletal:  No clubbing, cyanosis or edema bilaterally. Skin: Skin color, texture, turgor normal.  No rashes or lesions. Neurologic:  Neurovascularly intact without any focal sensory/motor deficits.  Cranial nerves: II-XII intact, grossly non-focal.  Psychiatric: Alert and oriented, thought content appropriate, normal insight  Capillary Refill: Brisk,< 3 seconds   Peripheral Pulses: +2 palpable, equal bilaterally     Labs:   Recent Labs     09/29/22  1117   WBC 17.9*   HGB 10.0*   HCT 30.6*        Recent Labs     09/29/22  1117   *   K 5.3*   CL 95*   CO2 23   BUN 14   CREATININE 1.0   CALCIUM 9.5     Recent Labs     09/29/22  1117   AST 24   ALT 8*   BILITOT 0.6   ALKPHOS 88     Recent Labs 09/29/22  1117   INR 1.02     No results for input(s): CKTOTAL, TROPONINI in the last 72 hours. Urinalysis:    Lab Results   Component Value Date/Time    NITRU NEGATIVE 09/29/2022 12:10 PM    WBCUA > 200 09/29/2022 12:10 PM    WBCUA 0-5 12/31/2021 09:45 PM    BACTERIA MANY 09/29/2022 12:10 PM    RBCUA 3-5 09/29/2022 12:10 PM    BLOODU SMALL 09/29/2022 12:10 PM    GLUCOSEU NEGATIVE 09/29/2022 12:10 PM       Radiology:   XR CHEST PORTABLE   Final Result   1. 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 Kyphotic positioning of the patient. 2. Normal heart size. Moderately ectatic aortic arch. 2. Small noncalcified nodular density right midlung laterally, 9 mm in diameter, possibly a noncalcified granuloma. If further evaluation is desired, CT thorax would be helpful. **This report has been created using voice recognition software. It may contain minor errors which are inherent in voice recognition technology. **      Final report electronically signed by Dr. Alin San on 9/29/2022 11:39 AM      CTA HEAD W WO CONTRAST (CODE STROKE)   Final Result       1. Mural calcification in the clinoid segments of internal carotid arteries with moderate stenosis. 2. Calcified mural plaque at the bilateral carotid bifurcations with tandem lesion in the left proximal internal carotid artery with 37% stenosis. There is no hemodynamic likely significant stenosis on the right by NASCET criteria. **This report has been created using voice recognition software. It may contain minor errors which are inherent in voice recognition technology. **      Final report electronically signed by Dr. Alyson Malhotra MD on 9/29/2022 11:55 AM      CTA NECK W WO CONTRAST (CODE STROKE)   Final Result       1. Mural calcification in the clinoid segments of internal carotid arteries with moderate stenosis.    2. Calcified mural plaque at the bilateral carotid bifurcations with tandem lesion in the left proximal internal carotid artery with 37% stenosis. There is no hemodynamic likely significant stenosis on the right by NASCET criteria. **This report has been created using voice recognition software. It may contain minor errors which are inherent in voice recognition technology. **      Final report electronically signed by Dr. Kinsey Aguilar MD on 9/29/2022 11:55 AM      CT HEAD WO CONTRAST   Final Result   1. No acute intracranial abnormality. The finding(s) was called to Dr. Delphina Gottron at 1127 hours on 9/29/2022 by Dr. Ezequiel Lindsay. Verbal acknowledgment and readback was given. **This report has been created using voice recognition software. It may contain minor errors which are inherent in voice recognition technology. **      Final report electronically signed by Dr Baljinder Velasquez on 9/29/2022 11:28 AM        CTA HEAD W WO CONTRAST (CODE STROKE)    Result Date: 9/29/2022  PROCEDURE: CTA HEAD W WO CONTRAST, CTA NECK W WO CONTRAST CLINICAL INFORMATION: Stroke Symptoms . COMPARISON: CT head from the same date. TECHNIQUE: Helical CT from the aortic arch through the head in arterial phase during fast bolus administration of 80 mL Isovue-370 injected in the right forearm with planar reconstructions to include volumetric maximum intensity projection sequences. All  CT scans at this facility use dose modulation, iterative reconstruction, and/or weight-based dosing when appropriate to reduce radiation dose to as low as reasonably achievable. FINDINGS: CTA HEAD: There are prominent mural calcifications in the clinoid segments of the internal carotid arteries with moderate stenosis. No aneurysmal dilatation is identified in the intracranial segments of internal carotid arteries. The bilateral middle cerebral and anterior cerebral arteries are patent without focal abnormality identified. There is a diminutive A1 segment on the left likely representing normal anatomic relation.  The basilar artery is patent without focal stenosis or visualized aneurysm. The bilateral posterior cerebral and superior cerebellar arteries are patent without focal abnormality identified. Dural venous sinuses appear patent without focal filling defect. No focal areas of abnormal parenchymal enhancement are identified. CTA NECK: There is a three-vessel arch. There is mild mural calcification in the brachiocephalic and left subclavian arteries without flow-limiting stenosis. There is mural cast condition in the takeoff of the left common carotid artery without flow-limiting stenosis. The left common carotid artery is smaller than the right. There is mild stenosis of the distal small segment of the left common carotid artery. There is calcified mural plaque at the carotid bulbs with tandem lesion in the proximal left internal carotid artery with narrowest luminal diameter measuring 2.4 mm and a point more distal, where the walls are parallel, measuring 3.8 mm. There is no hemodynamically significant stenosis on the right. Cervical segments of internal carotid arteries are otherwise patent without focal stenosis. Left vertebral artery is dominant. Vertebral arteries are patent throughout their course without focal stenosis. Mucosal surfaces of the aerodigestive tract are symmetric without focal nodular thickening or visualized mass. No cervical lymphadenopathy is identified. The parotid, submandibular and thyroid glands are unremarkable. Visualized portions of the lungs are  clear. There are moderate degenerative changes of the cervical spine. No suspicious osseous lesion is identified. 1. Mural calcification in the clinoid segments of internal carotid arteries with moderate stenosis. 2. Calcified mural plaque at the bilateral carotid bifurcations with tandem lesion in the left proximal internal carotid artery with 37% stenosis. There is no hemodynamic likely significant stenosis on the right by NASCET criteria.  **This report has been created using voice recognition software. It may contain minor errors which are inherent in voice recognition technology. ** Final report electronically signed by Dr. Julia Hargrove MD on 9/29/2022 11:55 AM    CT HEAD WO CONTRAST    Result Date: 9/29/2022  PROCEDURE: CT HEAD WO CONTRAST CLINICAL INFORMATION:Stroke Symptoms COMPARISON: None TECHNIQUE: 5 mm axial imaging through the head without IV contrast. All CT scans at this facility use dose modulation, iterative reconstruction, and/or weight based dosing when appropriate to reduce the radiation dose to as low as reasonably achievable. FINDINGS: Mild diffuse cerebral volume loss. Scattered periventricular and subcortical white matter hypodensities that likely relate to chronic microangiopathic disease. 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 Intracranial atherosclerotic calcifications. No ventriculomegaly. No midline shift or mass effect. No acute intracranial hemorrhage. No intracranial collection. Gray-white differentiation is unremarkable. The posterior fossa is unremarkable. The craniocervical junction is unremarkable. No acute bony abnormality. The bones appear demineralized. The  paranasal sinuses are clear. The  mastoid air cells are clear. The orbits are unremarkable. 1. No acute intracranial abnormality. The finding(s) was called to Dr. Michoacano Baltazar at 1127 hours on 9/29/2022 by Dr. Afua Hernandez. Verbal acknowledgment and readback was given. **This report has been created using voice recognition software. It may contain minor errors which are inherent in voice recognition technology. ** Final report electronically signed by Dr Lyn Pascual on 9/29/2022 11:28 AM    CTA NECK W [address] (CODE STROKE)    Result Date: 9/29/2022  PROCEDURE: CTA HEAD W WO CONTRAST, CTA NECK W WO CONTRAST CLINICAL INFORMATION: Stroke Symptoms . COMPARISON: CT head from the same date.  TECHNIQUE: Helical CT from the aortic arch through the head in arterial phase during fast bolus administration of 80 mL Isovue-370 injected in the right forearm with planar reconstructions to include volumetric maximum intensity projection sequences. All  CT scans at this facility use dose modulation, iterative reconstruction, and/or weight-based dosing when appropriate to reduce radiation dose to as low as reasonably achievable. FINDINGS: CTA HEAD: There are prominent mural calcifications in the clinoid segments of the internal carotid arteries with moderate stenosis. No aneurysmal dilatation is identified in the intracranial segments of internal carotid arteries. The bilateral middle cerebral and anterior cerebral arteries are patent without focal abnormality identified. There is a diminutive A1 segment on the left likely representing normal anatomic relation. The basilar artery is patent without focal stenosis or visualized aneurysm. The bilateral posterior cerebral and superior cerebellar arteries are patent without focal abnormality identified. Dural venous sinuses appear patent without focal filling defect. No focal areas of abnormal parenchymal enhancement are identified. CTA NECK: There is a three-vessel arch. There is mild mural calcification in the brachiocephalic and left subclavian arteries without flow-limiting stenosis. There is mural cast condition in the takeoff of the left common carotid artery without flow-limiting stenosis. The left common carotid artery is smaller than the right. There is mild stenosis of the distal small segment of the left common carotid artery. There is calcified mural plaque at the carotid bulbs with tandem lesion in the proximal left internal carotid artery with narrowest luminal diameter measuring 2.4 mm and a point more distal, where the walls are parallel, measuring 3.8 mm. There is no hemodynamically significant stenosis on the right.  Cervical segments of internal carotid arteries are otherwise patent without focal stenosis. Left vertebral artery is dominant. Vertebral arteries are patent throughout their course without focal stenosis. Mucosal surfaces of the aerodigestive tract are symmetric without focal nodular thickening or visualized mass. No cervical lymphadenopathy is identified. The parotid, submandibular and thyroid glands are unremarkable. Visualized portions of the lungs are  clear. There are moderate degenerative changes of the cervical spine. No suspicious osseous lesion is identified. 1. Mural calcification in the clinoid segments of internal carotid arteries with moderate stenosis. 2. Calcified mural plaque at the bilateral carotid bifurcations with tandem lesion in the left proximal internal carotid artery with 37% stenosis. There is no hemodynamic likely significant stenosis on the right by NASCET criteria. **This report has been created using voice recognition software. It may contain minor errors which are inherent in voice recognition technology. ** Final report electronically signed by Dr. Juli Mcmanus MD on 9/29/2022 11:55 AM    XR CHEST PORTABLE    Result Date: 9/29/2022  PROCEDURE: XR CHEST PORTABLE CLINICAL INFORMATION: stroke COMPARISON: No prior study. TECHNIQUE: A single mobile view of the chest was obtained. 1. 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 Kyphotic positioning of the patient. 2. Normal heart size. Moderately ectatic aortic arch. 2. Small noncalcified nodular density right midlung laterally, 9 mm in diameter, possibly a noncalcified granuloma. If further evaluation is desired, CT thorax would be helpful. **This report has been created using voice recognition software. It may contain minor errors which are inherent in voice recognition technology. ** Final report electronically signed by Dr. Sommer Greenfield on 9/29/2022 11:39 AM        Assessment and Plan:  Acute metabolic encephalopathy: confusion since this early morning.  Likely due to UTI in the setting of sleep disturbance last night. Symptoms resolving. CT head and CTA head/neck without acute findings, low suspicion for stroke. MRI cancelled per Neuro recommendation. Re-consider MRI if changes in mentation or neuro exam is noted. Neurology signed off. Treat for UTI with Rocephin. Monitor. Concern for CKD/Mild hyponatremia/mild hypokalemia: eGFR 51 with Cr 1.0 on admission, previously eGFR >60 with Cr 0.7-1.0 at baseline. Does not meet criteria for PONCHO. Na 130. K 5.3. No EKG changes. Son reports patient has been taking Aleve daily for knee pain due to Arthritis for 20+ . Will check renal US in the setting of UTI to rule out other abnormalities. Gentle short time IVF. Encourage PO intake. NIDDMII: controlled. HbA1c 7.1 on 06/15/2022. On Amaryl 2 mg and Metformin-XR 1500 mg nightly. Will continue amaryl (glipizide substitute inpatient). Hold Metformin. Low dose SSI. Monitor BG with inpatient goal 140-180. Elevated Troponin: Troponin 0.039 on admission. No prior level to compare. Likely demand ischemia in the setting of UTI. No chest pain. No ischemic changes on EKG. Monitor on Tele. Repeat level x1. Left facial mole s/p biopsy: pending results. Scabbed healing lesion ~ 1 cm. No associated paresthesia. Following with Dr. Yariel Dickerson. If malignant, plan for surgical excision on 10/26/2022. Hx Arthritis: takes Aleve daily x 20+ years. Chronic pain in knees. Weakness in legs while ambulating. PTOT. Avoid NSAIDs for now with concern for possible mild/borderline PONCHO. Code Status: FULL    Tele:   [x] yes             [] no    Fluids: mIVNS at 30 cc/hr  Diet: ADULT DIET; Regular;  Low Sodium (2 gm)    DVT prophylaxis: [] Lovenox                                 [] SCDs                                 [x] SQ Heparin                                 [] Encourage ambulation                                 [] Already on Anticoagulation      Disposition:      [x] TBD                             [] Home [] TCU                             [] Rehab                             [] Psych                             [] SNF                             [] [place]                             [] Other-      Electronically signed by   Rebekah Mayberry MD   PGY3, Internal Medicine  9/29/2022

## 2022-09-29 NOTE — FLOWSHEET NOTE
09/29/22 1604   Safe Environment   Safety Measures Other (comment)  (this VN completed pt admission with juan ramon Burrows)

## 2022-09-29 NOTE — ED NOTES
Pt transported to [facility]K20 by cart in stable condition. Called [facility] and informed Traci Gregg that the patient was on their way to the unit.       Victorino Lamb, MARY  40/09/37 [id number]

## 2022-09-29 NOTE — CONSULTS
flush  5-40 mL IntraVENous 2 times per day    heparin (porcine)  5,000 Units SubCUTAneous BID    [START ON 9/30/2022] cefTRIAXone (ROCEPHIN) IV  1,000 mg IntraVENous Once    aspirin EC  81 mg Oral Daily    [START ON 9/30/2022] glipiZIDE  5 mg Oral QAM AC    lisinopril  30 mg Oral Daily    pravastatin  40 mg Oral QPM    insulin lispro  0-4 Units SubCUTAneous TID WC    insulin lispro  0-4 Units SubCUTAneous Nightly     Continuous Infusions:    sodium chloride      dextrose      sodium chloride       PRN Meds: sodium chloride flush, sodium chloride, ondansetron **OR** ondansetron, polyethylene glycol, acetaminophen **OR** acetaminophen, glucose, dextrose bolus **OR** dextrose bolus, glucagon (rDNA), dextrose  Medications Prior to Admission:   No current facility-administered medications on file prior to encounter. Current Outpatient Medications on File Prior to Encounter   Medication Sig Dispense Refill    pravastatin (PRAVACHOL) 40 MG tablet Take 1 tablet by mouth every evening 90 tablet 3    lisinopril (PRINIVIL;ZESTRIL) 30 MG tablet Take 1 tablet by mouth daily 90 tablet 3    glimepiride (AMARYL) 2 MG tablet Take 1 tablet by mouth every morning (before breakfast) 90 tablet 3    blood glucose test strips (FREESTYLE LITE) strip 1 each by In Vitro route daily Dx: E11.29, check BS daily 100 each 3    FreeStyle Lancets MISC 1 each by Does not apply route daily Dx: E11.29, check bs daily 100 each 3    metFORMIN (GLUCOPHAGE-XR) 500 MG extended release tablet Take 3 tabs with supper 270 tablet 3    Misc. Devices ([address] [facility]) MISC R40.4, R53.1, H54.10 1 each 0    aspirin EC 81 MG EC tablet Take 81 mg by mouth daily      multivitamin (THERAGRAN) per tablet Take 1 tablet by mouth daily. Naproxen Sodium (ALEVE PO) Take  by mouth daily as needed. Loratadine (CLARITIN PO) Take  by mouth as needed.            Past History    Past Medical History:   has a past medical history of Broken leg, Controlled type 2 diabetes mellitus with microalbuminuria, without long-term current use of insulin ([facility] [address] Hyperlipidemia, Persistent proteinuria associated with type 2 diabetes mellitus ([facility] [address] and Varicose veins. Social History:   reports that she has never smoked. She has never used smokeless tobacco. She reports that she does not drink alcohol and does not use drugs. Family History:   Family History   Problem Relation Age of Onset    Stroke Mother        Physical Examination        NIH Stroke Scale  1a Level of consciousness 0=alert; keenly responsive   1b LOC questions 2=Performs neither task correctly   1c LOC commands 0=Performs both tasks correctly   2 Best Gaze 0=normal   3 Visual 0=No visual loss   4 Facial Palsy 0=Normal symmetric movement   5a Motor left arm 0=No drift, limb holds 90 (or 45) degrees for full 10 seconds   5b Motor right arm 0=No drift, limb holds 90 (or 45) degrees for full 10 seconds   6a Motor left leg 0=No drift, limb holds 90 (or 45) degrees for full 10 seconds   6b Motor right leg 0=No drift, limb holds 90 (or 45) degrees for full 10 seconds   7 Limb Ataxia 0=Absent   8 Sensory 0=Normal; no sensory loss   9 Best Language 0=No aphasia, normal   10 Dysarthria 0=Normal   11 Extinction and Inattention 0=No abnormality   TOTAL  2     Vitals:  BP (!) 140/57   Pulse 83   Temp 98.3 °F (36.8 °C) (Oral)   Resp 15   Wt 96 lb (43.5 kg)   SpO2 96%   BMI 20.06 kg/m²   Temp (24hrs), Av.3 °F (36.8 °C), Min:98.3 °F (36.8 °C), Max:98.3 °F (36.8 °C)      I/O (24Hr): No intake or output data in the 24 hours ending 22 1453    Physical Exam  Vitals reviewed. Constitutional:       General: She is not in acute distress. Appearance: She is not ill-appearing. Comments: Frail-appearing female in no acute distress   HENT:      Head: Normocephalic and atraumatic.       Right Ear: External ear normal.      Left Ear: External ear normal.      Nose: Nose normal.      Mouth/Throat:      Mouth: Mucous membranes are moist.      Pharynx: No oropharyngeal exudate or posterior oropharyngeal erythema. Comments: No tongue bite  Eyes:      Extraocular Movements: EOM normal.      Pupils: Pupils are equal, round, and reactive to light. Cardiovascular:      Rate and Rhythm: Normal rate and regular rhythm. Heart sounds: Normal heart sounds. No murmur heard. Pulmonary:      Effort: Pulmonary effort is normal. No respiratory distress. Breath sounds: Normal breath sounds. No wheezing. Abdominal:      General: Bowel sounds are normal.      Palpations: Abdomen is soft. Tenderness: There is no abdominal tenderness. Musculoskeletal:         General: Normal range of motion. Right lower leg: No edema. Left lower leg: No edema. Skin:     General: Skin is warm. Findings: No rash. Neurological:      Mental Status: She is alert. Coordination: Finger-Nose-Finger Test and Heel to [employer] normal.   Psychiatric:         Mood and Affect: Mood normal.         Behavior: Behavior normal.     Neurologic Exam     Mental Status   Attention: normal. Concentration: normal.   Level of consciousness: alert  Normal comprehension. Patient exhibits perseveration. No aphasia or dysarthria. Follows commands briskly. Cranial Nerves     CN II   Visual fields full to confrontation. Right visual field deficit: none  Left visual field deficit: none     CN III, IV, VI   Pupils are equal, round, and reactive to light. Extraocular motions are normal.   Right pupil: Shape: regular. Reactivity: brisk. Left pupil: Shape: regular. Reactivity: brisk. CN V   Facial sensation intact. Right facial sensation deficit: none  Left facial sensation deficit: none    CN VII   Facial expression full, symmetric.    Right facial weakness: none  Left facial weakness: none    CN VIII   CN VIII normal.   Hearing: intact    CN IX, X   CN IX normal.   CN X normal.   Palate: symmetric    CN XI   CN XI normal.   Right trapezius strength: normal  Left trapezius strength: normal    CN XII   CN XII normal.   Tongue: not atrophic  Fasciculations: absent  Tongue deviation: none    Motor Exam   Muscle bulk: normal  Overall muscle tone: normal  Right arm tone: normal  Left arm tone: normal  Right arm pronator drift: absent  Left arm pronator drift: absent  Right leg tone: normal  Left leg tone: normal  Muscle strength 5/5 in bilateral upper and lower extremities     Sensory Exam   Light touch normal.     Gait, Coordination, and Reflexes     Coordination   Finger to nose coordination: normal  Heel to shin coordination: normal     Labs/Imaging/Diagnostics   Labs:  CBC:  Recent Labs     09/29/22  1117   WBC 17.9*   RBC 3.14*   HGB 10.0*   HCT 30.6*   MCV 97.5        CHEMISTRIES:  Recent Labs     09/29/22  1049 09/29/22  1117   NA  --  130*   K  --  5.3*   CL  --  95*   CO2  --  23   BUN  --  14   CREATININE  --  1.0   GLUCOSE 137 155*     COAGULATION STUDIES:  Recent Labs     09/29/22  1117   INR 1.02   APTT 26.0     LIVER PROFILE:  Recent Labs     09/29/22  1117   AST 24   ALT 8*   BILITOT 0.6   ALKPHOS 88     CHOLESTEROL AND A1C:No results for input(s): LDLCALC, HDL, CHOL, TRIG, LABA1C in the last 720 hours. Imaging Last 24 Hours:  CTA HEAD W WO CONTRAST (CODE STROKE)    Result Date: 9/29/2022  PROCEDURE: CTA HEAD W WO CONTRAST, CTA NECK W WO CONTRAST CLINICAL INFORMATION: Stroke Symptoms . COMPARISON: CT head from the same date. TECHNIQUE: Helical CT from the aortic arch through the head in arterial phase during fast bolus administration of 80 mL Isovue-370 injected in the right forearm with planar reconstructions to include volumetric maximum intensity projection sequences. All  CT scans at this facility use dose modulation, iterative reconstruction, and/or weight-based dosing when appropriate to reduce radiation dose to as low as reasonably achievable.  FINDINGS: CTA HEAD: There are prominent mural calcifications in the clinoid segments of the internal carotid arteries with moderate stenosis. No aneurysmal dilatation is identified in the intracranial segments of internal carotid arteries. The bilateral middle cerebral and anterior cerebral arteries are patent without focal abnormality identified. There is a diminutive A1 segment on the left likely representing normal anatomic relation. The basilar artery is patent without focal stenosis or visualized aneurysm. The bilateral posterior cerebral and superior cerebellar arteries are patent without focal abnormality identified. Dural venous sinuses appear patent without focal filling defect. No focal areas of abnormal parenchymal enhancement are identified. CTA NECK: There is a three-vessel arch. There is mild mural calcification in the brachiocephalic and left subclavian arteries without flow-limiting stenosis. There is mural cast condition in the takeoff of the left common carotid artery without flow-limiting stenosis. The left common carotid artery is smaller than the right. There is mild stenosis of the distal small segment of the left common carotid artery. There is calcified mural plaque at the carotid bulbs with tandem lesion in the proximal left internal carotid artery with narrowest luminal diameter measuring 2.4 mm and a point more distal, where the walls are parallel, measuring 3.8 mm. There is no hemodynamically significant stenosis on the right. Cervical segments of internal carotid arteries are otherwise patent without focal stenosis. Left vertebral artery is dominant. Vertebral arteries are patent throughout their course without focal stenosis. Mucosal surfaces of the aerodigestive tract are symmetric without focal nodular thickening or visualized mass. No cervical lymphadenopathy is identified. The parotid, submandibular and thyroid glands are unremarkable. Visualized portions of the lungs are  clear. There are moderate degenerative changes of the cervical spine.  No suspicious osseous lesion is identified. 1. Mural calcification in the clinoid segments of internal carotid arteries with moderate stenosis. 2. Calcified mural plaque at the bilateral carotid bifurcations with tandem lesion in the left proximal internal carotid artery with 37% stenosis. There is no hemodynamic likely significant stenosis on the right by NASCET criteria. **This report has been created using voice recognition software. It may contain minor errors which are inherent in voice recognition technology. ** Final report electronically signed by Dr. Carlos Grider MD on 9/29/2022 11:55 AM    CT HEAD WO CONTRAST    Result Date: 9/29/2022  PROCEDURE: CT HEAD WO CONTRAST CLINICAL INFORMATION:Stroke Symptoms COMPARISON: None TECHNIQUE: 5 mm axial imaging through the head without IV contrast. All CT scans at this facility use dose modulation, iterative reconstruction, and/or weight based dosing when appropriate to reduce the radiation dose to as low as reasonably achievable. FINDINGS: Mild diffuse cerebral volume loss. Scattered periventricular and subcortical white matter hypodensities that likely relate to chronic microangiopathic disease. 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 Intracranial atherosclerotic calcifications. No ventriculomegaly. No midline shift or mass effect. No acute intracranial hemorrhage. No intracranial collection. Gray-white differentiation is unremarkable. The posterior fossa is unremarkable. The craniocervical junction is unremarkable. No acute bony abnormality. The bones appear demineralized. The  paranasal sinuses are clear. The  mastoid air cells are clear. The orbits are unremarkable. 1. No acute intracranial abnormality. The finding(s) was called to Dr. Alicia Lefort at 1127 hours on 9/29/2022 by Dr. Milagro Orantes. Verbal acknowledgment and readback was given. **This report has been created using voice recognition software.   It may contain minor errors which are inherent in voice recognition technology. ** Final report electronically signed by Dr Lowell Javed on 9/29/2022 11:28 AM    CTA NECK W [address] (CODE STROKE)    Result Date: 9/29/2022  PROCEDURE: CTA HEAD W WO CONTRAST, CTA NECK W WO CONTRAST CLINICAL INFORMATION: Stroke Symptoms . COMPARISON: CT head from the same date. TECHNIQUE: Helical CT from the aortic arch through the head in arterial phase during fast bolus administration of 80 mL Isovue-370 injected in the right forearm with planar reconstructions to include volumetric maximum intensity projection sequences. All  CT scans at this facility use dose modulation, iterative reconstruction, and/or weight-based dosing when appropriate to reduce radiation dose to as low as reasonably achievable. FINDINGS: CTA HEAD: There are prominent mural calcifications in the clinoid segments of the internal carotid arteries with moderate stenosis. No aneurysmal dilatation is identified in the intracranial segments of internal carotid arteries. The bilateral middle cerebral and anterior cerebral arteries are patent without focal abnormality identified. There is a diminutive A1 segment on the left likely representing normal anatomic relation. The basilar artery is patent without focal stenosis or visualized aneurysm. The bilateral posterior cerebral and superior cerebellar arteries are patent without focal abnormality identified. Dural venous sinuses appear patent without focal filling defect. No focal areas of abnormal parenchymal enhancement are identified. CTA NECK: There is a three-vessel arch. There is mild mural calcification in the brachiocephalic and left subclavian arteries without flow-limiting stenosis. There is mural cast condition in the takeoff of the left common carotid artery without flow-limiting stenosis. The left common carotid artery is smaller than the right. There is mild stenosis of the distal small segment of the left common carotid artery.  There is calcified mural plaque at the carotid bulbs with tandem lesion in the proximal left internal carotid artery with narrowest luminal diameter measuring 2.4 mm and a point more distal, where the walls are parallel, measuring 3.8 mm. There is no hemodynamically significant stenosis on the right. Cervical segments of internal carotid arteries are otherwise patent without focal stenosis. Left vertebral artery is dominant. Vertebral arteries are patent throughout their course without focal stenosis. Mucosal surfaces of the aerodigestive tract are symmetric without focal nodular thickening or visualized mass. No cervical lymphadenopathy is identified. The parotid, submandibular and thyroid glands are unremarkable. Visualized portions of the lungs are  clear. There are moderate degenerative changes of the cervical spine. No suspicious osseous lesion is identified. 1. Mural calcification in the clinoid segments of internal carotid arteries with moderate stenosis. 2. Calcified mural plaque at the bilateral carotid bifurcations with tandem lesion in the left proximal internal carotid artery with 37% stenosis. There is no hemodynamic likely significant stenosis on the right by NASCET criteria. **This report has been created using voice recognition software. It may contain minor errors which are inherent in voice recognition technology. ** Final report electronically signed by Dr. Eliezer Alvarado MD on 9/29/2022 11:55 AM    XR CHEST PORTABLE    Result Date: 9/29/2022  PROCEDURE: XR CHEST PORTABLE CLINICAL INFORMATION: stroke COMPARISON: No prior study. TECHNIQUE: A single mobile view of the chest was obtained. 1. 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 Kyphotic positioning of the patient. 2. Normal heart size. Moderately ectatic aortic arch. 2. Small noncalcified nodular density right midlung laterally, 9 mm in diameter, possibly a noncalcified granuloma.  If further evaluation is desired, CT thorax would be helpful. **This report has been created using voice recognition software. It may contain minor errors which are inherent in voice recognition technology. ** Final report electronically signed by Dr. Tiarra Patel on 9/29/2022 11:39 AM        Assessment and Plan:      1. Toxic metabolic encephalopathy secondary to underlying urinary tract infection  CT head without acute findings. CTA head and neck without significant stenosis. MRI can be deferred from neurologic standpoint at this time as patient has no focal neurologic deficit  Treatment of urinary tract infection and metabolic abnormalities per primary team  Neurology will sign off at this time. Please call with any questions or concerns. This patient was seen and evaluated with Dr. Tricia Hill and he is in agreement with the assessment and plan.     Electronically signed by Jeremías Hernandez PA-C on 9/29/22 at 3:00 PM EDT

## 2022-09-30 LAB
ANION GAP SERPL CALCULATED.3IONS-SCNC: 10 MEQ/L (ref 8–16)
BASOPHILS # BLD: 0.3 %
BASOPHILS ABSOLUTE: 0 THOU/MM3 (ref 0–0.1)
BUN BLDV-MCNC: 13 MG/DL (ref 7–22)
CALCIUM SERPL-MCNC: 8.8 MG/DL (ref 8.5–10.5)
CHLORIDE BLD-SCNC: 98 MEQ/L (ref 98–111)
CO2: 24 MEQ/L (ref 23–33)
CREAT SERPL-MCNC: 0.7 MG/DL (ref 0.4–1.2)
EOSINOPHIL # BLD: 0.1 %
EOSINOPHILS ABSOLUTE: 0 THOU/MM3 (ref 0–0.4)
ERYTHROCYTE [DISTWIDTH] IN BLOOD BY AUTOMATED COUNT: 12.9 % (ref 11.5–14.5)
ERYTHROCYTE [DISTWIDTH] IN BLOOD BY AUTOMATED COUNT: 45.3 FL (ref 35–45)
GFR SERPL CREATININE-BSD FRML MDRD: 78 ML/MIN/1.73M2
GLUCOSE BLD-MCNC: 136 MG/DL (ref 70–108)
GLUCOSE BLD-MCNC: 193 MG/DL (ref 70–108)
GLUCOSE BLD-MCNC: 366 MG/DL (ref 70–108)
GLUCOSE BLD-MCNC: 54 MG/DL (ref 70–108)
GLUCOSE BLD-MCNC: 61 MG/DL (ref 70–108)
GLUCOSE BLD-MCNC: 71 MG/DL (ref 70–108)
HCT VFR BLD CALC: 28.2 % (ref 37–47)
HEMOGLOBIN: 9.3 GM/DL (ref 12–16)
IMMATURE GRANS (ABS): 0.04 THOU/MM3 (ref 0–0.07)
IMMATURE GRANULOCYTES: 0.4 %
LYMPHOCYTES # BLD: 8.2 %
LYMPHOCYTES ABSOLUTE: 0.8 THOU/MM3 (ref 1–4.8)
MCH RBC QN AUTO: 31.6 PG (ref 26–33)
MCHC RBC AUTO-ENTMCNC: 33 GM/DL (ref 32.2–35.5)
MCV RBC AUTO: 95.9 FL (ref 81–99)
MONOCYTES # BLD: 9 %
MONOCYTES ABSOLUTE: 0.8 THOU/MM3 (ref 0.4–1.3)
NUCLEATED RED BLOOD CELLS: 0 /100 WBC
PLATELET # BLD: 254 THOU/MM3 (ref 130–400)
PMV BLD AUTO: 9.9 FL (ref 9.4–12.4)
POTASSIUM REFLEX MAGNESIUM: 4.4 MEQ/L (ref 3.5–5.2)
RBC # BLD: 2.94 MILL/MM3 (ref 4.2–5.4)
SEG NEUTROPHILS: 82 %
SEGMENTED NEUTROPHILS ABSOLUTE COUNT: 7.7 THOU/MM3 (ref 1.8–7.7)
SODIUM BLD-SCNC: 132 MEQ/L (ref 135–145)
TROPONIN T: 0.05 NG/ML
WBC # BLD: 9.4 THOU/MM3 (ref 4.8–10.8)

## 2022-09-30 PROCEDURE — 96366 THER/PROPH/DIAG IV INF ADDON: CPT

## 2022-09-30 PROCEDURE — 80048 BASIC METABOLIC PNL TOTAL CA: CPT

## 2022-09-30 PROCEDURE — 82948 REAGENT STRIP/BLOOD GLUCOSE: CPT

## 2022-09-30 PROCEDURE — 97530 THERAPEUTIC ACTIVITIES: CPT

## 2022-09-30 PROCEDURE — 84484 ASSAY OF TROPONIN QUANT: CPT

## 2022-09-30 PROCEDURE — 85025 COMPLETE CBC W/AUTO DIFF WBC: CPT

## 2022-09-30 PROCEDURE — 51701 INSERT BLADDER CATHETER: CPT

## 2022-09-30 PROCEDURE — 6370000000 HC RX 637 (ALT 250 FOR IP)

## 2022-09-30 PROCEDURE — 6360000002 HC RX W HCPCS: Performed by: STUDENT IN AN ORGANIZED HEALTH CARE EDUCATION/TRAINING PROGRAM

## 2022-09-30 PROCEDURE — 92610 EVALUATE SWALLOWING FUNCTION: CPT

## 2022-09-30 PROCEDURE — G0378 HOSPITAL OBSERVATION PER HR: HCPCS

## 2022-09-30 PROCEDURE — 6370000000 HC RX 637 (ALT 250 FOR IP): Performed by: STUDENT IN AN ORGANIZED HEALTH CARE EDUCATION/TRAINING PROGRAM

## 2022-09-30 PROCEDURE — 99220 PR INITIAL OBSERVATION CARE/DAY 70 MINUTES: CPT | Performed by: INTERNAL MEDICINE

## 2022-09-30 PROCEDURE — G0378 HOSPITAL OBSERVATION PER HR: HCPCS | Performed by: INTERNAL MEDICINE

## 2022-09-30 PROCEDURE — 51798 US URINE CAPACITY MEASURE: CPT

## 2022-09-30 PROCEDURE — 2580000003 HC RX 258: Performed by: STUDENT IN AN ORGANIZED HEALTH CARE EDUCATION/TRAINING PROGRAM

## 2022-09-30 PROCEDURE — 92523 SPEECH SOUND LANG COMPREHEN: CPT

## 2022-09-30 PROCEDURE — 96372 THER/PROPH/DIAG INJ SC/IM: CPT

## 2022-09-30 PROCEDURE — 97535 SELF CARE MNGMENT TRAINING: CPT

## 2022-09-30 PROCEDURE — 96361 HYDRATE IV INFUSION ADD-ON: CPT

## 2022-09-30 PROCEDURE — 97166 OT EVAL MOD COMPLEX 45 MIN: CPT

## 2022-09-30 PROCEDURE — 36415 COLL VENOUS BLD VENIPUNCTURE: CPT

## 2022-09-30 RX ORDER — INSULIN GLARGINE 100 [IU]/ML
5 INJECTION, SOLUTION SUBCUTANEOUS NIGHTLY
Status: DISCONTINUED | OUTPATIENT
Start: 2022-09-30 | End: 2022-10-02 | Stop reason: HOSPADM

## 2022-09-30 RX ORDER — INSULIN LISPRO 100 [IU]/ML
0-4 INJECTION, SOLUTION INTRAVENOUS; SUBCUTANEOUS NIGHTLY
Status: DISCONTINUED | OUTPATIENT
Start: 2022-09-30 | End: 2022-10-02 | Stop reason: HOSPADM

## 2022-09-30 RX ORDER — INSULIN LISPRO 100 [IU]/ML
0-8 INJECTION, SOLUTION INTRAVENOUS; SUBCUTANEOUS
Status: DISCONTINUED | OUTPATIENT
Start: 2022-09-30 | End: 2022-10-02 | Stop reason: HOSPADM

## 2022-09-30 RX ADMIN — ASPIRIN 81 MG: 81 TABLET, COATED ORAL at 08:41

## 2022-09-30 RX ADMIN — SODIUM CHLORIDE, PRESERVATIVE FREE 10 ML: 5 INJECTION INTRAVENOUS at 21:53

## 2022-09-30 RX ADMIN — SODIUM CHLORIDE, PRESERVATIVE FREE 10 ML: 5 INJECTION INTRAVENOUS at 08:40

## 2022-09-30 RX ADMIN — PRAVASTATIN SODIUM 40 MG: 40 TABLET ORAL at 17:11

## 2022-09-30 RX ADMIN — INSULIN LISPRO 8 UNITS: 100 INJECTION, SOLUTION INTRAVENOUS; SUBCUTANEOUS at 17:10

## 2022-09-30 RX ADMIN — CEFTRIAXONE SODIUM 1000 MG: 1 INJECTION, POWDER, FOR SOLUTION INTRAMUSCULAR; INTRAVENOUS at 00:13

## 2022-09-30 RX ADMIN — INSULIN GLARGINE 5 UNITS: 100 INJECTION, SOLUTION SUBCUTANEOUS at 21:49

## 2022-09-30 RX ADMIN — GLIPIZIDE 5 MG: 5 TABLET ORAL at 06:47

## 2022-09-30 RX ADMIN — LISINOPRIL 30 MG: 20 TABLET ORAL at 08:40

## 2022-09-30 RX ADMIN — HEPARIN SODIUM 5000 UNITS: 5000 INJECTION INTRAVENOUS; SUBCUTANEOUS at 08:41

## 2022-09-30 RX ADMIN — HEPARIN SODIUM 5000 UNITS: 5000 INJECTION INTRAVENOUS; SUBCUTANEOUS at 21:52

## 2022-09-30 ASSESSMENT — PAIN SCALES - GENERAL
PAINLEVEL_OUTOF10: 0
PAINLEVEL_OUTOF10: 0

## 2022-09-30 NOTE — FLOWSHEET NOTE
09/30/22 1801   Safe Environment   Safety Measures Other (comment)  (virtual safety round complete)   Virtual nurse rounds, when audio in, conversation in room not interrupted and camera not turned on.

## 2022-09-30 NOTE — PROGRESS NOTES
[address] [place] [address]  SPEECH THERAPY  [facility] RENAL TELEMETRY 6K  Speech - Language - Cognitive Evaluation + Clinical Swallow Evaluation    SLP Individual Minutes  Time In: 5141  Time Out: 0780  Minutes: 37  Timed Code Treatment Minutes: 0 Minutes     Speech, Language, Cognitive Evaluation: 29 minutes   Clinical Swallow Evaluation: 8 minutes     Date: 2022  Patient Name: Anat Coker      CSN: [id number]   : 1926  (80 y.o.)  Gender: female   Referring Physician: Amanuel Gaitan MD  Diagnosis: Acute cystitis without hematuria   Precautions: Fall risk, aspiration precautions   History of Present Illness/Injury: Patient admit to [facility] with above medical dx. Per physician H&P, \"27 yo F with history NIDDMII, HLD, and recently noted left facial mole undergoing work up for possible malignancy, brought in to [facility] ED per son due to notable acute confusion overnight. ED: afebrile, hemodynamically stable but notable for tachypnea RR 28, saturating well on RA. NIHSS 5. STROKE ALERT called due to confusion. POC Gluc 137. CT head wo contrast with no acute abnormality. CTA head/neck showed moderate internal carotid artery stenosis, otherwise no significant stenosis. Labs notable for Na 130, K 5.3, Cl 5.3, BUN 14, Cr 1.0 with eGFR 51 (previous 0.8 on 2022), WBC 17.9, Hb 10. Given 1L IVNS bolus with Rocephin x1 dose. On-call interventional neurologist team recommended MRI for further evaluation. Admitted to hospitalist team for further evaluation/management. On evaluation, per patient's two sons present at bedside, patient was at her baseline until around 3AM when son checked on her, gave some water with Tylenol, and noted her to cough. Patient was restless and did not sleep well since then. Son also noted her to be confused and not doing things right as her routine this morning which prompted this ED visit.  Son states, she was unable to recognize him and his brother, unable to tell her birth date, and did not know how to wear her dentures, all of which is not her normal. Has been at baseline recently. No recent URI, complaints of chest pain, dyspnea, change in activity level, diarrhea, dysuria. Had a UTI last year and had a fall sustaining back back injury ~1.5 year. Has had a \"foggy mind\" since then per son but all symptoms since this morning has been new. No prior stroke or MI. Lifetime nonsmoker, no alcohol or IVDU history. Lives with son. Patient reports no symptoms including subjective fever, nausea, chest pain, dyspnea, dizziness, abdominal pain, dysuria. Oriented to self, place, and person now. Knows son brought her in to the hospital but unable to recall details. Knows birth date and recognized sons at bedside. \"   Stroke work up negative with concerns for possible UTI. ST consult for CSE/cognitive assessment to r/o deficits and determine need for further follow up. Past Medical History:   Diagnosis Date    Broken leg 1982    Lt    Controlled type 2 diabetes mellitus with microalbuminuria, without long-term current use of insulin ([facility])     Hyperlipidemia     Persistent proteinuria associated with type 2 diabetes mellitus ([facility] [address]     Varicose veins        Pain: No pain reported. Subjective:  Session approved by RNMert. Patient seen upright bed with son present. Alert and cooperative. SOCIAL HISTORY:   Living Arrangements: Lives at home with son   Work History: Retired  Education Level: HS degree   Driving Status: Does not drive  Finance Management: Assistance Required  Medication Management:  Supervision required   ADL's: Assistance Required.    Hobbies: playing games with sons   Vision Status: Glasses for reading   Hearing: decreased acuity   Type of Home: House  Home Layout: One level  Home Access: Level entry  Home Equipment: Dacia Class, rolling    SPEECH / VOICE:  Speech and Voice appear to be grossly intact for basic and complex daily communication    LANGUAGE:  Receptive:  2 Step Commands: 3/3  Complex Yes/No Questions: 2/3    Expressive:  Confrontational Naming: 3/3 objects, 3/3 MOCA  Responsive Namin/3  Sentence Formulation: WFL   Conversational Speech: needed extra time to formulate thoughts, perseveration, high level anomia   Paraphasias: present  Repetition: sentence level 2/2    COGNITION:  Jaden Cognitive Assessment (MOCA) version 7.2 completed. Pt scored /30. Normal is greater than or equal to 26/30. Inclusion of +1 point given highest level of education achieved less than/equal to 12th grade or GED with limited-0 post-secondary schooling     Orientation: 46  Immediate Recall: 1/5 indep, improved to 2/5 with repetition  Short-Term Recall: 0/5 indep, 1/5 min cues, 4/5 FO2  Divergent Naming: x2 wpm (N=11+)  Reasonin/2  Thought Organization: moderate deficits  Attention: impaired   Math Computation: 0/1  Executive Functionin/5    SWALLOWING:    Respiratory Status: Room Air      Behavioral Observation: Alert and cooperative    CRANIAL NERVE ASSESSMENT   CN V (Trigeminal) Closes and Opens Mandible WFL    Rotary Jaw Movement Impaired      CN VII (Facial) Cheeks Hold Food out of Sulci WFL    Opens, Closes/Seals, Protrudes, Retracts Lips WFL    General Appearance WFL    Sensation Not Tested      CN X (Vagus - Pharyngeal) Raises Back of Tongue WFL      CN XI (Accessory) Lifts Soft Palate WFL      CN XII (Hypoglossal) Elevates Tongue Up and Back Impaired    Protrusion   WFL    Lateralizes Tongue WFL    Sensation Not Tested      Other Observations Dentition Ill fitting dentures     Vocal Quality WFL     Cough Not tested     PATIENT WAS EVALUATED USING:  Thin Liquids, Puree, and Coarse Solids    ORAL PHASE:  Impaired:  Impaired Mastication    PHARYNGEAL PHASE:  WFL:  Pharyngeal phase appears WFL but cannot rule out pharyngeal phase deficits from a bedside swallowing evaluation alone.     SIGNS AND SYMPTOMS OF LARYNGEAL PENETRATION / ASPIRATION:  No signs/symptoms of aspiration evident in this evaluation, but cannot rule out silent aspiration. INSTRUMENTAL EVALUATION: Instrumental evaluation not indicated at this time. DIET RECOMMENDATIONS:  Soft/bite sized and thin liquids     STRATEGIES: Full Upright Position, Small Bite/Sip, Pulmonary Monitoring, Medication in Applesauce, Alternate Solids and Liquids, Limit Distractions, and Monitor for Fatigue          RECOMMENDATIONS/ASSESSMENT:  DIAGNOSTIC IMPRESSIONS:  Clinical Swallow Evaluation: Patient presents with mild oral dysphagia with inability to fully discern potential presence of pharyngeal phase deficits without formal instrumentation. Demonstration of impaired mastication s/t ill fitting dentures; however, adequate formation/transit when provided with extra time. Absence of oral stasis to follow. Patient with a consistent audible swallow with inability to r/o premature spillage. Multiple swallows to follow PO consumption. Certainly cannot r/o pharyngeal stasis and/or airway invasion without formal instrumentation. Recommendations for advancement to soft/bite sized and thin liquids with medications in applesauce. ST to follow up with ongoing dietary monitor to ensure tolerance. Speech, Language, Cognitive Evaluation: Patient presents with moderate cognitive deficits evidenced by impairments within functional carryover (I.e. orientation, immediate/delayed recall), decreased mental flexibility, abstract reasoning, thought organization, attention, mathematical computation and executive functioning. Mild high level receptive language deficits with need for repetitions to successfully follow complex commands and answer complex yes/no questions.  At least mild expressive language deficits with difficulty with higher level naming/mental flexibility (I.e. responsive/divergent naming), need for extra time to formulate thoughts, perseveration, presence of paraphasias and high level anomia. No overt dysarthria/dysphonia. Son confirms patient skills are at baseline with low level cognitive demands in the home. Will sign off on further cognitive rehabilitation. Rehabilitation Potential: good  Discharge Recommendations: Home with Home Exercise Program    EDUCATION:  Learner: Patient and son  Education:  Reviewed results and recommendations of this evaluation, Reviewed diet and strategies, and Reviewed ST goals and Plan of Care  Evaluation of Education: Verbalizes understanding, Demonstrates with assistance, and Needs further instruction    PLAN:  Skilled SLP intervention on acute care 3-5 x per week or until goals met and/or pt plateaus in function. Specific interventions for next session may include: dietary monitor. PATIENT GOAL:    Did not state. Will further assess during treatment. SHORT TERM GOALS:  Short Term Goals  Time Frame for Short Term Goals: 1-2 sessions  Goal 1: Patient will consume a soft/bite sized diet and thin liquids without overt s/s of airway invasion to meet nutritional/hydration measures safely.     LONG TERM GOALS:  No established LTG's given short KALINOS       JOHN Herring 9/30/2022

## 2022-09-30 NOTE — PROGRESS NOTES
Occupational Therapy  [address]  INPATIENT OCCUPATIONAL THERAPY  [facility] RENAL TELEMETRY 6K  EVALUATION    Time:   Time In: 0900  Time Out: 4734  Timed Code Treatment Minutes: 45 Minutes  Minutes: 47          Date: 2022  Patient Name: Tory Live,   Gender: female      MRN: [id number]  : 1926  (80 y.o.)  Referring Practitioner: Familia Leonardo MD  Diagnosis: acute cystitis without hematuria       Restrictions/Precautions:  Restrictions/Precautions: Fall Risk, Up as Tolerated    Subjective  Chart Reviewed: Yes, Orders, Imaging    Subjective: Nurse approved OT evaluation. Pt in bed upon OT arrival. pleasant and agreeable to OT evaluation. Pain: denies    Vitals: Vitals not assessed per clinical judgement, see nursing flowsheet    Social/Functional History:  Lives With: Son (Son is retired  and provides  supervision. Pt has a life alert for when he has to run out for groceries.)  Type of Home: House  Home Layout: One level  Home Access: Level entry  Home Equipment: Mardee Negro, rolling   Bathroom Shower/Tub: Tub/Shower unit  Bathroom Equipment: Grab bars in shower, Shower chair    Receives Help From: Family  ADL Assistance: Needs assistance  Homemaking Assistance: Needs assistance  Ambulation Assistance: Needs assistance (Per son, pt uses crutches or walker for short functional mobility and constant CGA.  Pt's son also uses wc frequently due to hx of knee buckling with ambulation in the past.)  Transfer Assistance: Needs assistance    Active : No  Occupation: Retired       VISION:WFL    HEARING:   [place]    COGNITION: Slow Processing, Decreased Recall, Impaired Memory, Decreased Problem Solving, and Decreased Safety Awareness    RANGE OF MOTION:  Right Upper Extremity: WFL  Left Upper Extremity:  WFL    STRENGTH:  Right Upper Extremity: Impaired - grossly 3-/5  Left Upper Extremity:  Impaired - grossly 3-/5    ADL:   Grooming: Stand By Assistance, with set-up, and with increased time for completion. To brush denture and face  Bathing: Minimal Assistance for UB bathing and Maximum Assistance for LB bathing from recliner for sponge bath. Upper Extremity Dressing: Moderate Assistance. For hospital gown   Lower Extremity Dressing: Maximum Assistance. For pull up brief  Toileting: Dependent. Pt incontinent of urine upon arrival to this session. Pt unaware that her pull up is saturated. Dependent to doff/good/provide any care. Jayshree Landeros BALANCE:  Sitting Balance:  Contact Guard Assistance. Standing Balance: Minimal Assistance. With RW and cues for upright postural alignment    BED MOBILITY:  Supine to Sit: Maximum Assistance      TRANSFERS:  To/From Bed and Chair: Minimal Assistance. X1 with verbal cues for walker management and upright posture    FUNCTIONAL MOBILITY:  Assistive Device: Rolling Walker  Assist Level:  Minimal Assistance, X 1, and with increased time for completion. Distance: To bedside chair on opposite side of IV pole. Pt unable to safely ambulate distance to bathroom this date. Activity Tolerance:  Patient tolerance of  treatment: fair. Assessment:  Assessment: Pt presents with exacerbated above deficits s/p hospitalization for acute toxic metabolic encephalopathy 2/2 acute cystitis. Pt requires increased assist for all ADL transfers and ADLs compared to PLOF when she was at home with son requiring Roberta. Skilled OT services is warranted to progrss pt to PLOF in order to safely return home with 24/7 S from son. Performance deficits / Impairments: Decreased functional mobility , Decreased ADL status, Decreased strength, Decreased endurance, Decreased safe awareness, Decreased balance  Prognosis: Good  REQUIRES OT FOLLOW-UP: Yes  Decision Making: Medium Complexity    Treatment Initiated: Treatment and education initiated within context of evaluation.   Evaluation time included review of current medical information, gathering information related to past medical, social and functional history, completion of standardized testing, formal and informal observation of tasks, assessment of data and development of plan of care and goals. Treatment time included skilled education and facilitation of tasks to increase safety and independence with ADL's for improved functional independence and quality of life. Discharge Recommendations:  24 hour supervision or assist    Patient Education:     Patient Education  Education Given To: Patient, Family  Education Provided: Role of Therapy, Plan of Care, Transfer Training, Equipment  Education Method: Demonstration, Verbal  Barriers to Learning: None  Education Outcome: Verbalized understanding, Demonstrated understanding    Equipment Recommendations:  Equipment Needed: No    Plan:  Times per Week: 3-5x  Times per Day: Daily  Current Treatment Recommendations: Strengthening, Self-Care / ADL, Neuromuscular re-education, Balance training, Patient/Caregiver education & training. See long-term goal time frame for expected duration of plan of care. If no long-term goals established, a short length of stay is anticipated. Goals:  Patient goals : to return home  Short Term Goals  Time Frame for Short term goals: until discharge  Short Term Goal 1: Pt will safely navigate short HH distances utilizing RW with CGA and 0-1 cues for postural correction and safe walker management to improve access to ADLs. Short Term Goal 2: Pt will perform ADL transfers with CGA utiizing RW and 0-1 cues for technique. Short Term Goal 3: Pt will complete UB/LB dressing with Min A. Short Term Goal 4: Pt will tolerate x3 minute standing with single UE release to improve independence with sinkside ADLs and clothing management when toileting. Following session, patient left in safe position with all fall risk precautions in place.

## 2022-09-30 NOTE — FLOWSHEET NOTE
09/30/22 1015   Safe Environment   Safety Measures Other (comment)  (virtual safety round complete)   Virtual nurse rounds, pt did not respond to audio, so camera turned on to visually check safety of patient.  pt sleeping, resp unlabored

## 2022-09-30 NOTE — PROGRESS NOTES
Internal Medicine Resident Progress Note    Patient:  Marla James    YOB: 1926  Unit/Bed:6K-20/020-A  MRN: [id number]    Acct: [de-identified]   PCP: Dion Riley MD    Date of Admission: 9/29/2022    Assessment/Plan:    Acute Metabolic Encephalopathy: Improving. Likely due to UTI. UA was positive for large amount of leukocyte esterase and small amount of blood. Patient presented to the ER 9/29/22 for confusion. Code stroke was called. CT of head showed no acute intracranial abnormality. CTA of head and neck showed no hemodynamic likely significant stenosis on the right. No acute finding. Neurology has signed off. Re-consider MRI if changes in mentation or neuro exam is noted. -Continuing rocephin 1 gram Q24 hrs x5 days.  -Urine Culture pending, will deescalate antibiotics as indicated. -Continuing to monitor    CKD: eGFR 51 with Cr 1.0 on admission, previously eGFR >60 with Cr 0.7-1.0 at baseline. Does not meet criteria for PONCHO. Na 130. K 5.3. No EKG changes. Son reports patient has been taking Aleve daily for knee pain due to Arthritis for 20+ . Renal US showed mild bilateral hydronephrosis, left greater than right. Large urinary bladder volume with 1.4L of fluid with debris present. Gentle short time IVF were given. Encourage PO intake. Most recent Cr 0.7 and GFR 78. NIDDMII: Controlled. HbA1c 7.1 on 06/15/2022. On Amaryl 2 mg and Metformin-XR 1500 mg nightly. Amaryl was continued with a low dose sliding scale. Metformin was held. Monitor BG with inpatient goal 140-180. Hypoglycemia protocol in place. Patient had an episode of hypoglycemia.   -Amaryl discontinued. Elevated Troponin: Down trending. Troponin 0.039 on admission. No prior level to compare. Likely demand ischemia in the setting of UTI. No chest pain. No ischemic changes on EKG. Monitor on Tele. Repeat level x1. Left facial mole s/p biopsy: pending results. Scabbed healing lesion ~ 1 cm.  No associated paresthesia. Following with Dr. Gely Scott. If malignant, plan for surgical excision on 10/26/2022. Hypertension: Controlled. Lisinopril 30 mg continued  Hyperlipidemia: Pravastatin 40 mg continued  Nodular density, right mid lung: In the right mid lung there is a 9 mm diameter small noncalcified nodular density. Possibly representing a non calcified granuloma. For further evaluation radiology recommended a CT thorax. Patient can follow up in the outpatient setting for further imaging. Expected discharge date:  TBD    Disposition: TBD  [] Home  [] TCU  [] Rehab  [] Psych  [] SNF  [] [facility]  [] Other-    ===================================================================    Chief Complaint: Altered Mental Status    Hospital Course:    Per H&P, \"81 yo F with history NIDDMII, HLD, and recently noted left facial mole undergoing work up for possible malignancy, brought in to [facility] ED per son due to notable acute confusion overnight. On evaluation, per patient's two sons present at bedside, patient was at her baseline until around 3AM when son checked on her, gave some water with Tylenol, and noted her to cough. Patient was restless and did not sleep well since then. Son also noted her to be confused and not doing things right as her routine this morning which prompted this ED visit. Son states, she was unable to recognize him and his brother, unable to tell her birth date, and did not know how to wear her dentures, all of which is not her normal. Has been at baseline recently. No recent URI, complaints of chest pain, dyspnea, change in activity level, diarrhea, dysuria. Had a UTI last year and had a fall sustaining back back injury ~1.5 year. Has had a \"foggy mind\" since then per son but all symptoms since this morning has been new. No prior stroke or MI. Lifetime nonsmoker, no alcohol or IVDU history. Lives with son.  Patient reports no symptoms including subjective fever, nausea, chest pain, dyspnea, dizziness, abdominal pain, dysuria. Oriented to self, place, and person now. Knows son brought her in to the hospital but unable to recall details. Knows birth date and recognized sons at bedside. ED: afebrile, hemodynamically stable but notable for tachypnea RR 28, saturating well on RA. NIHSS 2. STROKE ALERT called due to confusion. POC Gluc 137. CT head wo contrast with no acute abnormality. CTA head/neck showed moderate internal carotid artery stenosis, otherwise no significant stenosis. Labs notable for Na 130, K 5.3, Cl 5.3, BUN 14, Cr 1.0 with eGFR 51 (previous 0.8 on 06/2022), WBC 17.9, Hb 10. Given 1L IVNS bolus with Rocephin x1 dose. On-call interventional neurologist team recommended MRI for further evaluation. Patient was outside of the window for [facility] and intervention. Admitted to hospitalist team for further evaluation/management. \"     Subjective (past 24 hours):   Toni Álvarez is seen and evaluated at bedside this morning. Patient reports she is doing well and has no concerns or complaints this morning. Vital signs are stable. She is afebrile. Patient states she knows who she is, where she is at and why she is here. Zepeda altered mental status appears to be improving. She denies fever, chills, weakness, dizziness, lightheadedness. Patient denies chest pain, chest heaviness, shortness of breath, cough, abdominal pain, nausea, vomiting. Patient has not noticed any blood in her urine or pain with urination. ROS: reviewed complete ROS unchanged unless otherwise statd in hospital course/subjective portion.      Medications:  Reviewed    Infusion Medications    sodium chloride      dextrose       Scheduled Medications    sodium chloride flush  5-40 mL IntraVENous 2 times per day    heparin (porcine)  5,000 Units SubCUTAneous BID    cefTRIAXone (ROCEPHIN) IV  1,000 mg IntraVENous Q24H    aspirin EC  81 mg Oral Daily    lisinopril  30 mg Oral Daily    pravastatin  40 mg Oral QPM    insulin lispro 09/29/22  1117   INR 1.02     No results for input(s): CKTOTAL, TROPONINI in the last 72 hours. No results for input(s): PROCAL in the last 72 hours. Lab Results   Component Value Date/Time    NITRU NEGATIVE 09/29/2022 12:10 PM    WBCUA > 200 09/29/2022 12:10 PM    WBCUA 0-5 12/31/2021 09:45 PM    BACTERIA MANY 09/29/2022 12:10 PM    RBCUA 3-5 09/29/2022 12:10 PM    BLOODU SMALL 09/29/2022 12:10 PM    GLUCOSEU NEGATIVE 09/29/2022 12:10 PM     Radiology (48 hours):  CTA HEAD W WO CONTRAST (CODE STROKE)    Result Date: 9/29/2022   1. Mural calcification in the clinoid segments of internal carotid arteries with moderate stenosis. 2. Calcified mural plaque at the bilateral carotid bifurcations with tandem lesion in the left proximal internal carotid artery with 37% stenosis. There is no hemodynamic likely significant stenosis on the right by NASCET criteria. **This report has been created using voice recognition software. It may contain minor errors which are inherent in voice recognition technology. ** Final report electronically signed by Dr. Carlos Grider MD on 9/29/2022 11:55 AM    CT HEAD WO CONTRAST    Result Date: 9/29/2022  1. No acute intracranial abnormality. The finding(s) was called to Dr. Alicia Lefort at 1127 hours on 9/29/2022 by Dr. Milagro Orantes. Verbal acknowledgment and readback was given. **This report has been created using voice recognition software. It may contain minor errors which are inherent in voice recognition technology. ** Final report electronically signed by Dr Mindy Bhakta on 9/29/2022 11:28 AM    CTA NECK W [address] (CODE STROKE)    Result Date: 9/29/2022   1. Mural calcification in the clinoid segments of internal carotid arteries with moderate stenosis. 2. Calcified mural plaque at the bilateral carotid bifurcations with tandem lesion in the left proximal internal carotid artery with 37% stenosis.  There is no hemodynamic likely significant stenosis on the right by NASCET criteria. **This report has been created using voice recognition software. It may contain minor errors which are inherent in voice recognition technology. ** Final report electronically signed by Dr. Kraig Elias MD on 9/29/2022 11:55 AM    US RENAL COMPLETE    Result Date: 9/29/2022  1. Mild bilateral hydronephrosis, left greater than right. 2. Large urinary bladder volume with 1.4 L of fluid. There is debris present within the urinary bladder, nonspecific. This document has been electronically signed by: Swetha Kahn MD on 09/29/2022 08:00 PM    XR CHEST PORTABLE    Result Date: 9/29/2022  1. 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 Kyphotic positioning of the patient. 2. Normal heart size. Moderately ectatic aortic arch. 2. Small noncalcified nodular density right midlung laterally, 9 mm in diameter, possibly a noncalcified granuloma. If further evaluation is desired, CT thorax would be helpful. **This report has been created using voice recognition software. It may contain minor errors which are inherent in voice recognition technology. ** Final report electronically signed by Dr. Clyde Bauman on 9/29/2022 11:39 AM     DVT prophylaxis:    [] Lovenox  [] SCDs  [x] SQ Heparin  [] Encourage ambulation   [] Already on Anticoagulation       Diet: ADULT DIET; Dysphagia - Soft and Bite Sized;  Low Sodium (2 gm)  Code Status: Full Code  PT/OT: Following  Tele: Yes, 24 hrs  IVF: No    Electronically signed by Adina Hamilton DO, PGY-1 on 9/30/2022 at 4:46 PM    Case was discussed with Attending, Dr. Katharine Lopez

## 2022-09-30 NOTE — CARE COORDINATION
Case Management Assessment  Initial Evaluation    Date/Time of Evaluation: 9/30/2022 12:01 PM  Assessment Completed by: Tyrone Carlton RN    If patient is discharged prior to next notation, then this note serves as note for discharge by case management. Patient Name: Flora Duarte                   YOB: 1926  Diagnosis: Acute cystitis without hematuria [N30.00]  Altered mental status, unspecified altered mental status type [R41.82]  AMS (altered mental status) [R41.82]                   Date / Time: 9/29/2022 10:46 AM    Patient Admission Status: Observation     Current PCP: Willian Lopes MD  PCP verified by CM? Yes    Chart Reviewed: Yes      History Provided by: Child/Family  Patient Orientation:      Patient Cognition: Other (see comment) (admitted with AMS)    Hospitalization in the last 30 days (Readmission):  No    If yes, Readmission Assessment in  Navigator will be completed. Advance Directives:     Code Status: Full Code     Primary Decision Maker: Adalberto Pratt - Child - [phone]    Discharge Planning  Patient lives with: Children Type of Home: House  Primary Care Giver: Family  Patient Support Systems include: Children   Current Financial resources: Medicare, Other (Comment) ()  Current community resources:    Current services prior to admission: [employer] (walker, wheelchair, cane, raised toilet seat, grab bars in bathroom and shower. ramp to leave the house, shower chair, intercom system and montior for safety and communication)   Type of Home Care services:  None    ADLS  Prior functional level: Assistance with the following:, Bathing, Dressing, Toileting, Cooking, Housework, Shopping, Mobility  Current functional level: Assistance with the following:, Bathing, Dressing, [address] [facility] [address] [facility], Housework    Family can provide assistance at DC:  Yes  Would you like Case Management to discuss the discharge plan with any other family members/significant others, and if so, who? Yes (sons)  Plans to Return to Present Housing: Yes  Other Identified Issues/Barriers to RETURNING to current housing: none  Potential Assistance needed at discharge: N/A  Patient expects to discharge to: [address] [place] [address] for transportation at discharge: Family    Financial  Payor: Pratima Covert / Plan: MEDICARE PART A AND B / Product Type: *No Product type* /     Does insurance require precert for SNF: No    Potential assistance Purchasing Medications: No  Meds-to-Beds request: Yes    Factors facilitating achievement of predicted outcomes: Family support, Pleasant, Good insight into deficits, and Has needed Durable Medical Equipment at home    Barriers to discharge: Confusion      Testin/29  Renal: Mild bilateral hydronephrosis, left greater than right. Large urinary bladder volume with 1.4 L of fluid. There is debris present within the urinary bladder, nonspecific. Additional Case Management Notes: Admit from ER as observation with AMS. NIHSS 5 in ER. Found to have UTI. Neurology signed off. IV Rocephin. PT, OT, SLP to see. 22 11:17 22 11:51 22 14:06 22 14:37   Sodium 130 (L)      Potassium 5.3 (H)      Lactic Acid, Sepsis  2.0 (H) 1.5    CRP    4.52 (H)   Troponin T 0.039 !   0.052 ! TSH    6.140 (H)   WBC 17.9 (H)          The Plan for Transition of Care is related to the following treatment goals of Acute cystitis without hematuria [N30.00]  Altered mental status, unspecified altered mental status type [R41.82]  AMS (altered mental status) [R41.82]    The Patient and/or Patient Representative Agree with the Discharge Plan? Yes (Plan to return home with son. Denies any further needs. )    Marin Schulz RN  Case Management Department

## 2022-09-30 NOTE — PLAN OF CARE
Problem: Discharge Planning  Goal: Discharge to home or other facility with appropriate resources  Outcome: Progressing  Flowsheets (Taken 9/30/2022 1811)  Discharge to home or other facility with appropriate resources:   Arrange for needed discharge resources and transportation as appropriate   Identify discharge learning needs (meds, wound care, etc)   Identify barriers to discharge with patient and caregiver     Problem: Skin/Tissue Integrity  Goal: Absence of new skin breakdown  Description: 1. Monitor for areas of redness and/or skin breakdown  2. Assess vascular access sites hourly  3. Every 4-6 hours minimum:  Change oxygen saturation probe site  4. Every 4-6 hours:  If on nasal continuous positive airway pressure, respiratory therapy assess nares and determine need for appliance change or resting period. Outcome: Progressing  Note: Mepilex in place. Pt turned frequently. Problem: Safety - Adult  Goal: Free from fall injury  Outcome: Progressing  Flowsheets (Taken 9/30/2022 1811)  Free From Fall Injury:   Instruct family/caregiver on patient safety   Based on caregiver fall risk screen, instruct family/caregiver to ask for assistance with transferring infant if caregiver noted to have fall risk factors     Problem: ABCDS Injury Assessment  Goal: Absence of physical injury  Outcome: Progressing  Flowsheets (Taken 9/30/2022 1811)  Absence of Physical Injury: Implement safety measures based on patient assessment     Problem: Confusion  Goal: Confusion, delirium, dementia, or psychosis is improved or at baseline  Description: INTERVENTIONS:  1. Assess for possible contributors to thought disturbance, including medications, impaired vision or hearing, underlying metabolic abnormalities, dehydration, psychiatric diagnoses, and notify attending LIP  2. [place] high risk fall precautions, as indicated  3. Provide frequent short contacts to provide reality reorientation, refocusing and direction  4. Decrease environmental stimuli, including noise as appropriate  5. Monitor and intervene to maintain adequate nutrition, hydration, elimination, sleep and activity  6. If unable to ensure safety without constant attention obtain sitter and review sitter guidelines with assigned personnel  7.  Initiate Psychosocial CNS and Spiritual Care consult, as indicated  Outcome: Progressing  Flowsheets (Taken 9/30/2022 1811)  Effect of thought disturbance (confusion, delirium, dementia, or psychosis) are managed with adequate functional status:   [place] high risk fall precautions, as indicated   Monitor and intervene to maintain adequate nutrition, hydration, elimination, sleep and activity   Assess for contributors to thought disturbance, including medications, impaired vision or hearing, underlying metabolic abnormalities, dehydration, psychiatric diagnoses, notify LIP

## 2022-09-30 NOTE — FLOWSHEET NOTE
09/30/22 1406   Safe Environment   Safety Measures Other (comment)  (virtual safety round complete)   Virtual Nurse introduced, pt denies needs at this time.

## 2022-10-01 PROBLEM — G93.41 ACUTE METABOLIC ENCEPHALOPATHY: Status: ACTIVE | Noted: 2022-10-01

## 2022-10-01 LAB
ALBUMIN SERPL-MCNC: 3.2 G/DL (ref 3.5–5.1)
ALP BLD-CCNC: 86 U/L (ref 38–126)
ALT SERPL-CCNC: 7 U/L (ref 11–66)
ANION GAP SERPL CALCULATED.3IONS-SCNC: 11 MEQ/L (ref 8–16)
AST SERPL-CCNC: 21 U/L (ref 5–40)
BASOPHILS # BLD: 0.2 %
BASOPHILS ABSOLUTE: 0 THOU/MM3 (ref 0–0.1)
BILIRUB SERPL-MCNC: < 0.2 MG/DL (ref 0.3–1.2)
BUN BLDV-MCNC: 12 MG/DL (ref 7–22)
CALCIUM SERPL-MCNC: 9.1 MG/DL (ref 8.5–10.5)
CHLORIDE BLD-SCNC: 100 MEQ/L (ref 98–111)
CO2: 25 MEQ/L (ref 23–33)
CREAT SERPL-MCNC: 0.7 MG/DL (ref 0.4–1.2)
EOSINOPHIL # BLD: 1.4 %
EOSINOPHILS ABSOLUTE: 0.1 THOU/MM3 (ref 0–0.4)
ERYTHROCYTE [DISTWIDTH] IN BLOOD BY AUTOMATED COUNT: 13.1 % (ref 11.5–14.5)
ERYTHROCYTE [DISTWIDTH] IN BLOOD BY AUTOMATED COUNT: 46.2 FL (ref 35–45)
GFR SERPL CREATININE-BSD FRML MDRD: 78 ML/MIN/1.73M2
GLUCOSE BLD-MCNC: 338 MG/DL (ref 70–108)
GLUCOSE BLD-MCNC: 87 MG/DL (ref 70–108)
GLUCOSE BLD-MCNC: 90 MG/DL (ref 70–108)
HCT VFR BLD CALC: 29.7 % (ref 37–47)
HEMOGLOBIN: 9.8 GM/DL (ref 12–16)
IMMATURE GRANS (ABS): 0.03 THOU/MM3 (ref 0–0.07)
IMMATURE GRANULOCYTES: 0.4 %
LYMPHOCYTES # BLD: 22.1 %
LYMPHOCYTES ABSOLUTE: 1.8 THOU/MM3 (ref 1–4.8)
MCH RBC QN AUTO: 31.8 PG (ref 26–33)
MCHC RBC AUTO-ENTMCNC: 33 GM/DL (ref 32.2–35.5)
MCV RBC AUTO: 96.4 FL (ref 81–99)
MONOCYTES # BLD: 14.8 %
MONOCYTES ABSOLUTE: 1.2 THOU/MM3 (ref 0.4–1.3)
NUCLEATED RED BLOOD CELLS: 0 /100 WBC
PLATELET # BLD: 281 THOU/MM3 (ref 130–400)
PMV BLD AUTO: 9.9 FL (ref 9.4–12.4)
POTASSIUM REFLEX MAGNESIUM: 4.8 MEQ/L (ref 3.5–5.2)
RBC # BLD: 3.08 MILL/MM3 (ref 4.2–5.4)
SEG NEUTROPHILS: 61.1 %
SEGMENTED NEUTROPHILS ABSOLUTE COUNT: 4.9 THOU/MM3 (ref 1.8–7.7)
SODIUM BLD-SCNC: 136 MEQ/L (ref 135–145)
TOTAL PROTEIN: 6.5 G/DL (ref 6.1–8)
WBC # BLD: 8.1 THOU/MM3 (ref 4.8–10.8)

## 2022-10-01 PROCEDURE — 99232 SBSQ HOSP IP/OBS MODERATE 35: CPT | Performed by: INTERNAL MEDICINE

## 2022-10-01 PROCEDURE — 6370000000 HC RX 637 (ALT 250 FOR IP): Performed by: STUDENT IN AN ORGANIZED HEALTH CARE EDUCATION/TRAINING PROGRAM

## 2022-10-01 PROCEDURE — 96366 THER/PROPH/DIAG IV INF ADDON: CPT

## 2022-10-01 PROCEDURE — 51702 INSERT TEMP BLADDER CATH: CPT

## 2022-10-01 PROCEDURE — 1200000000 HC SEMI PRIVATE

## 2022-10-01 PROCEDURE — 2580000003 HC RX 258: Performed by: STUDENT IN AN ORGANIZED HEALTH CARE EDUCATION/TRAINING PROGRAM

## 2022-10-01 PROCEDURE — 80053 COMPREHEN METABOLIC PANEL: CPT

## 2022-10-01 PROCEDURE — 82948 REAGENT STRIP/BLOOD GLUCOSE: CPT

## 2022-10-01 PROCEDURE — 36415 COLL VENOUS BLD VENIPUNCTURE: CPT

## 2022-10-01 PROCEDURE — 85025 COMPLETE CBC W/AUTO DIFF WBC: CPT

## 2022-10-01 PROCEDURE — 6360000002 HC RX W HCPCS: Performed by: STUDENT IN AN ORGANIZED HEALTH CARE EDUCATION/TRAINING PROGRAM

## 2022-10-01 PROCEDURE — G0378 HOSPITAL OBSERVATION PER HR: HCPCS

## 2022-10-01 PROCEDURE — 6370000000 HC RX 637 (ALT 250 FOR IP)

## 2022-10-01 PROCEDURE — 51798 US URINE CAPACITY MEASURE: CPT

## 2022-10-01 RX ORDER — CEFDINIR 300 MG/1
300 CAPSULE ORAL EVERY 12 HOURS SCHEDULED
Status: DISCONTINUED | OUTPATIENT
Start: 2022-10-02 | End: 2022-10-02 | Stop reason: HOSPADM

## 2022-10-01 RX ORDER — DIPHENHYDRAMINE HYDROCHLORIDE 50 MG/ML
25 INJECTION INTRAMUSCULAR; INTRAVENOUS
Status: ACTIVE | OUTPATIENT
Start: 2022-10-01 | End: 2022-10-02

## 2022-10-01 RX ADMIN — PRAVASTATIN SODIUM 40 MG: 40 TABLET ORAL at 18:44

## 2022-10-01 RX ADMIN — INSULIN GLARGINE 5 UNITS: 100 INJECTION, SOLUTION SUBCUTANEOUS at 20:29

## 2022-10-01 RX ADMIN — SODIUM CHLORIDE, PRESERVATIVE FREE 10 ML: 5 INJECTION INTRAVENOUS at 10:12

## 2022-10-01 RX ADMIN — SODIUM CHLORIDE, PRESERVATIVE FREE 10 ML: 5 INJECTION INTRAVENOUS at 20:45

## 2022-10-01 RX ADMIN — LISINOPRIL 30 MG: 20 TABLET ORAL at 10:11

## 2022-10-01 RX ADMIN — INSULIN LISPRO 4 UNITS: 100 INJECTION, SOLUTION INTRAVENOUS; SUBCUTANEOUS at 20:29

## 2022-10-01 RX ADMIN — HEPARIN SODIUM 5000 UNITS: 5000 INJECTION INTRAVENOUS; SUBCUTANEOUS at 10:12

## 2022-10-01 RX ADMIN — CEFTRIAXONE SODIUM 1000 MG: 1 INJECTION, POWDER, FOR SOLUTION INTRAMUSCULAR; INTRAVENOUS at 00:44

## 2022-10-01 RX ADMIN — ACETAMINOPHEN 650 MG: 325 TABLET ORAL at 00:53

## 2022-10-01 RX ADMIN — ASPIRIN 81 MG: 81 TABLET, COATED ORAL at 10:11

## 2022-10-01 RX ADMIN — HEPARIN SODIUM 5000 UNITS: 5000 INJECTION INTRAVENOUS; SUBCUTANEOUS at 20:29

## 2022-10-01 ASSESSMENT — PAIN DESCRIPTION - LOCATION: LOCATION: TOE (COMMENT WHICH ONE)

## 2022-10-01 ASSESSMENT — PAIN SCALES - GENERAL: PAINLEVEL_OUTOF10: 10

## 2022-10-01 ASSESSMENT — PAIN DESCRIPTION - DESCRIPTORS: DESCRIPTORS: ACHING

## 2022-10-01 ASSESSMENT — PAIN DESCRIPTION - ORIENTATION: ORIENTATION: RIGHT

## 2022-10-01 NOTE — PROGRESS NOTES
Internal Medicine Resident Progress Note    Patient:  Zee Castillo    YOB: 1926  Unit/Bed:6K-20/020-A  MRN: [id number]    Acct: [de-identified]   PCP: Jonathon John MD    Date of Admission: 9/29/2022    Assessment/Plan:  #Acute Metabolic Encephalopathy 2/2 UTI, resolved: Presented to ED on 09/29 for confusion. CT head showed no acute intracranial abnormality. CTA head/neck showed moderate stenosis of clinoid segments of internal carotid arteries, 37% stenosis left proximal ICA. No acute finding. Neurology has signed off. Re-consider MRI if changes in mentation or neuro exam is noted. - Patient A&O x4, family reports she is at her baseline  - Continue to monitor    #UTI: UA showing large leukocyte esterase, negative nitrite, many bacteria present. Urine cultures pos for enteric gram neg bacilli. Received Rocephin 1 g for 3 days. - Discontinue Rocephin  - Switch to PO Cefdinir 300 mg Q12H starting on 10/02/22, for two days (end date 10/03/22). 4 total doses to be given. - Patient reports Penicillin allergy, associated w/ hives and throat swelling. Patient has been tolerating Rocephin without any issues. - If patient develops itching/hives after antibiotic administration, administer Benadryl 25 mg IV and inform physician. - Sensitivities pending, will follow up    #Elevated Troponin 2/2 demand ischemia due to above: 0.039, 0.052, 0.047. Troponin equivocal, pt denies chest pain or SOB, no ST-T wave changes seen on EKG. - Continue telemetry    #CKD: eGFR 51 with Cr 1.0 on admission, previously eGFR >60 with Cr 0.7-1.0 at baseline. Does not meet criteria for PONCHO. Na 130. K 5.3. Son reports patient has been taking Aleve daily for knee pain due to Arthritis for 20+ years. Renal US showed mild bilateral hydronephrosis, left greater than right, distended bladder.  - Will continue to monitor with daily BMP    #NIDDMII, controlled: HBA1c 7.1% on 06/15/2022.  On Amaryle 2 mg and Metformin-XR 1500 mg nightly at home. Amaryl was continued on admission however patient had episode of hypoglycemia on 09/30, Amaryl was discontinued. - Continue Lantus 5 units nightly  - Continue SSI  - Hypoglycemia protocol   - Recommend patient follow up with PCP to discuss resuming Amaryl after discharge    #HTN, controlled: on Lisinopril 30 mg daily at home. - Continue PO Lisinopril 30 mg daily    #HLD: on Pravastatin 40 mg daily at home. - Continue PO Pravastatin 40 mg daily    #Left facial mole s/p biopsy: pathology results pending, scabbed healing lesion ~ 1 cm, no associated paresthesia. Follows with Dr. Pavel Calero. If malignant, plan for surgical excision on 10/26/2022. #Nodular density, right mid lung: small noncalcified nodular density in right mid-lung, 9 mm diameter, possibly representing non-calcified granuloma, noted on CXR  - Recommend outpatient follow up. Per radiology, recommending CT chest.    #GOC, Code Status discussion: Spoke with patient and family regarding code status. Patient currently full code. Elderly patient, frail. Patient would like to discuss with sons first before making decision regarding changing code status. Expected discharge date:  10/02/2022    Disposition: TBD  [] Home  [] TCU  [] Rehab  [] Psych  [] SNF  [] [facility]  [] Other-    ===================================================================      Chief Complaint: Altered mental status    Hospital Course:   Per H&P, \"79 yo F with history NIDDMII, HLD, and recently noted left facial mole undergoing work up for possible malignancy, brought in to [facility] ED per son due to notable acute confusion overnight. On evaluation, per patient's two sons present at bedside, patient was at her baseline until around 3AM when son checked on her, gave some water with Tylenol, and noted her to cough. Patient was restless and did not sleep well since then.  Son also noted her to be confused and not doing things right as her routine this morning which prompted this ED visit. Son states, she was unable to recognize him and his brother, unable to tell her birth date, and did not know how to wear her dentures, all of which is not her normal. Has been at baseline recently. No recent URI, complaints of chest pain, dyspnea, change in activity level, diarrhea, dysuria. Had a UTI last year and had a fall sustaining back back injury ~1.5 year. Has had a \"foggy mind\" since then per son but all symptoms since this morning has been new. No prior stroke or MI. Lifetime nonsmoker, no alcohol or IVDU history. Lives with son. Patient reports no symptoms including subjective fever, nausea, chest pain, dyspnea, dizziness, abdominal pain, dysuria. Oriented to self, place, and person now. Knows son brought her in to the hospital but unable to recall details. Knows birth date and recognized sons at bedside. ED: afebrile, hemodynamically stable but notable for tachypnea RR 28, saturating well on RA. NIHSS 2. STROKE ALERT called due to confusion. POC Gluc 137. CT head wo contrast with no acute abnormality. CTA head/neck showed moderate internal carotid artery stenosis, otherwise no significant stenosis. Labs notable for Na 130, K 5.3, Cl 5.3, BUN 14, Cr 1.0 with eGFR 51 (previous 0.8 on 06/2022), WBC 17.9, Hb 10. Given 1L IVNS bolus with Rocephin x1 dose. On-call interventional neurologist team recommended MRI for further evaluation. Patient was outside of the window for [facility] and intervention. Admitted to hospitalist team for further evaluation/management. \"      Subjective (past 24 hours):   Patient seen and examined at the bedside this morning, sons were at bedside. No overnight events. Patient says she is doing well. Denies N/V, chest pain, SOB, fevers, chills, abdominal pain, dysuria, hematuria. ROS: reviewed complete ROS unchanged unless otherwise stated in hospital course/subjective portion.        Medications:  Reviewed    Infusion Medications    sodium chloride      dextrose       Scheduled Medications    [START ON 10/2/2022] cefdinir  300 mg Oral 2 times per day    insulin glargine  5 Units SubCUTAneous Nightly    insulin lispro  0-8 Units SubCUTAneous TID     insulin lispro  0-4 Units SubCUTAneous Nightly    sodium chloride flush  5-40 mL IntraVENous 2 times per day    heparin (porcine)  5,000 Units SubCUTAneous BID    aspirin EC  81 mg Oral Daily    lisinopril  30 mg Oral Daily    pravastatin  40 mg Oral QPM     PRN Meds: sodium chloride flush, sodium chloride, ondansetron **OR** ondansetron, polyethylene glycol, acetaminophen **OR** acetaminophen, glucose, dextrose bolus **OR** dextrose bolus, glucagon (rDNA), dextrose        Intake/Output Summary (Last 24 hours) at 10/1/2022 1217  Last data filed at 10/1/2022 0100  Gross per 24 hour   Intake 180 ml   Output 1350 ml   Net -1170 ml       Exam:  BP (!) 150/65   Pulse 96   Temp 98.6 °F (37 °C) (Oral)   Resp 16   Ht 4' 10\" (1.473 m)   Wt 96 lb (43.5 kg)   SpO2 100%   BMI 20.06 kg/m²     General: No distress, appears stated age. Sitting comfortably in bed. Eyes:  PERRL. Conjunctivae/corneas clear. HENT: Head normal appearing. Nares normal. Oral mucosa moist.  Hearing intact. Neck: Supple, with full range of motion. Trachea midline. No gross JVD appreciated. Respiratory:  Normal effort. Clear to auscultation, without rales or wheezes or rhonchi. Cardiovascular: Normal rate, regular rhythm with normal S1/S2 without murmurs. No lower extremity edema. Abdomen: Soft, non-tender, non-distended with normal bowel sounds. Musculoskeletal: No joint swelling or tenderness. Normal tone. No abnormal movements. Skin: Warm and dry. Left-sided facial mole noted, scabbed healing lesion ~1 cm in size. Neurologic:  No focal sensory/motor deficits in the upper or lower extremities. Cranial nerves:  grossly non-focal 2-12. Psychiatric: Alert and oriented, normal insight and thought content.    Capillary Refill:

## 2022-10-01 NOTE — CONSULTS
Comprehensive Nutrition Assessment    Type and Reason for Visit:  Initial, Consult (General Nutrition Management)    Nutrition Recommendations/Plan:   Recommend a Multivitamin daily. Started Glucerna TID and Sal BID. Continue current diet. Malnutrition Assessment:  Malnutrition Status:       Context:  Acute Illness     Findings of the 6 clinical characteristics of malnutrition:  Energy Intake:  No significant decrease in energy intake (per son report pt was still eating 3 meals at baseline PTA)  Weight Loss:  No significant weight loss (advanced age)     Body Fat Loss:  Unable to assess (advanced age)     Muscle Mass Loss:  Unable to assess (advanced age)    Fluid Accumulation:  No significant fluid accumulation Extremities   Strength:  Not Performed    Nutrition Assessment: Pt. nutritionally compromised AEB wounds. At risk for further nutrition compromise r/t increased nutrient needs for wound healing, weight loss, admit w/ confusion and underlying medical condition (Hx: DM, HLD). Nutrition Related Findings:    Pt. Report/Treatments/Miscellaneous: Admit d/t confusion; pt seen with son present- pt reports she is unsure of UBW or how much she was eating PTA; per son- pt lives with the other son who was not there and pt was eating 3 meals daily at baseline PTA. Pt amenable to Glucerna TID and Sal BID during LOS. GI Status: Pt denies N/V. No BM yet. Pertinent Labs: HgA1c 7.1% on 6/15/22. 10/1/22: Glucose 90; POC Glucose 81. Pertinent Meds: ATB, Lantus, Humalog  Wound Type: Multiple (left face scab; coccyx wound; back wound)       Current Nutrition Intake & Therapies:    Average Meal Intake: %  Average Supplements Intake:  (initiated today)  ADULT DIET; Dysphagia - Soft and Bite Sized; Low Sodium (2 gm)  ADULT ORAL NUTRITION SUPPLEMENT; Breakfast, Lunch, Dinner; Diabetic Oral Supplement  ADULT ORAL NUTRITION SUPPLEMENT; Dinner, Lunch;  Wound Healing Oral Supplement    Anthropometric Measures:  Height: 4' 10\" (147.3 cm)  Ideal Body Weight (IBW): 90 lbs (41 kg)    Admission Body Weight: 96 lb (43.5 kg) (10/1; no edema noted)  Current Body Weight: 96 lb (43.5 kg) (10/1; no edema noted)  Current BMI (kg/m2): 20.1  Usual Body Weight:  (Pt unsure of UBW. Per EMR: 102 lb 3.2 oz on 11/30/21; 96 lb 12.8 oz on 6/15/22)  BMI Categories: Underweight (BMI less than 22) age over 72    Estimated Daily Nutrient Needs:  Energy Requirements Based On: Kcal/kg  Weight Used for Energy Requirements: Current  Energy (kcal/day): 2408-9467 kcal/day (30-35 kcal/kg)  Protein (g/day): 68+ g/day (1.5+ g/kg)    Nutrition Diagnosis:   Increased nutrient needs related to increase demand for energy/nutrients as evidenced by wounds    Nutrition Interventions:   Food and/or Nutrient Delivery: Continue Current Diet, Start Oral Nutrition Supplement, Vitamin Supplement  Nutrition Education/Counseling: Education initiated, Education declined (Encouraged po intake of meals and ONS at best effort during LOS. Pt declines need for Carbohydrate Controlled diet educaton during LOS. Pt states she follows this diet at home and chekc her blood sugars regularly.)  Coordination of Nutrition Care: Continue to monitor while inpatient    Goals:  Goals: PO intake 75% or greater, by next RD assessment    Nutrition Monitoring and Evaluation:   Behavioral-Environmental Outcomes: None Identified  Food/Nutrient Intake Outcomes: Diet Advancement/Tolerance, Food and Nutrient Intake, Supplement Intake, Vitamin/Mineral Intake  Physical Signs/Symptoms Outcomes: Biochemical Data, GI Status, Weight, Skin, Nutrition Focused Physical Findings    Discharge Planning:     Too soon to determine     Suzanna Leone, MS, RD, LD  Contact: [phone]

## 2022-10-01 NOTE — FLOWSHEET NOTE
10/01/22 1130   Safe Environment   Safety Measures   (Virtual RN rounds complete)   PT did not respond to audio, camera turned on in the interest of pt safety. Resting comfortably in bed, no needs identified.

## 2022-10-01 NOTE — PLAN OF CARE
Problem: Discharge Planning  Goal: Discharge to home or other facility with appropriate resources  10/1/2022 0218 by Maci Farias RN  Outcome: Progressing     Problem: Skin/Tissue Integrity  Goal: Absence of new skin breakdown  Description: 1. Monitor for areas of redness and/or skin breakdown  2. Assess vascular access sites hourly  3. Every 4-6 hours minimum:  Change oxygen saturation probe site  4. Every 4-6 hours:  If on nasal continuous positive airway pressure, respiratory therapy assess nares and determine need for appliance change or resting period. 10/1/2022 0218 by Maci Farias RN  Outcome: Progressing     Problem: Safety - Adult  Goal: Free from fall injury  10/1/2022 0218 by Maci Farias RN  Outcome: Progressing     Problem: ABCDS Injury Assessment  Goal: Absence of physical injury  10/1/2022 0218 by Maci Farias RN  Outcome: Progressing     Problem: Confusion  Goal: Confusion, delirium, dementia, or psychosis is improved or at baseline  Description: INTERVENTIONS:  1. Assess for possible contributors to thought disturbance, including medications, impaired vision or hearing, underlying metabolic abnormalities, dehydration, psychiatric diagnoses, and notify attending LIP  2. [place] high risk fall precautions, as indicated  3. Provide frequent short contacts to provide reality reorientation, refocusing and direction  4. Decrease environmental stimuli, including noise as appropriate  5. Monitor and intervene to maintain adequate nutrition, hydration, elimination, sleep and activity  6. If unable to ensure safety without constant attention obtain sitter and review sitter guidelines with assigned personnel  7.  Initiate Psychosocial CNS and Spiritual Care consult, as indicated  10/1/2022 0218 by Maci Farias RN  Outcome: Progressing

## 2022-10-01 NOTE — PROCEDURES
Bladder scan completed at 2320 and results told to [facility]. Scan showed 635 mL in the patients bladder. Last void was unknown.  Flores Villagomez CET

## 2022-10-02 VITALS
BODY MASS INDEX: 20.15 KG/M2 | TEMPERATURE: 98.2 F | WEIGHT: 96 LBS | HEIGHT: 58 IN | SYSTOLIC BLOOD PRESSURE: 137 MMHG | DIASTOLIC BLOOD PRESSURE: 62 MMHG | HEART RATE: 95 BPM | OXYGEN SATURATION: 95 % | RESPIRATION RATE: 16 BRPM

## 2022-10-02 LAB
ALBUMIN SERPL-MCNC: 3.2 G/DL (ref 3.5–5.1)
ALP BLD-CCNC: 85 U/L (ref 38–126)
ALT SERPL-CCNC: 9 U/L (ref 11–66)
ANION GAP SERPL CALCULATED.3IONS-SCNC: 12 MEQ/L (ref 8–16)
AST SERPL-CCNC: 22 U/L (ref 5–40)
BASOPHILS # BLD: 0.4 %
BASOPHILS ABSOLUTE: 0 THOU/MM3 (ref 0–0.1)
BILIRUB SERPL-MCNC: 0.2 MG/DL (ref 0.3–1.2)
BUN BLDV-MCNC: 15 MG/DL (ref 7–22)
CALCIUM SERPL-MCNC: 9.1 MG/DL (ref 8.5–10.5)
CHLORIDE BLD-SCNC: 96 MEQ/L (ref 98–111)
CO2: 23 MEQ/L (ref 23–33)
CREAT SERPL-MCNC: 0.6 MG/DL (ref 0.4–1.2)
EOSINOPHIL # BLD: 1.8 %
EOSINOPHILS ABSOLUTE: 0.1 THOU/MM3 (ref 0–0.4)
ERYTHROCYTE [DISTWIDTH] IN BLOOD BY AUTOMATED COUNT: 12.8 % (ref 11.5–14.5)
ERYTHROCYTE [DISTWIDTH] IN BLOOD BY AUTOMATED COUNT: 45.4 FL (ref 35–45)
GFR SERPL CREATININE-BSD FRML MDRD: > 90 ML/MIN/1.73M2
GLUCOSE BLD-MCNC: 110 MG/DL (ref 70–108)
GLUCOSE BLD-MCNC: 111 MG/DL (ref 70–108)
GLUCOSE BLD-MCNC: 352 MG/DL (ref 70–108)
HCT VFR BLD CALC: 30.5 % (ref 37–47)
HEMOGLOBIN: 10 GM/DL (ref 12–16)
IMMATURE GRANS (ABS): 0.03 THOU/MM3 (ref 0–0.07)
IMMATURE GRANULOCYTES: 0.4 %
LYMPHOCYTES # BLD: 22.2 %
LYMPHOCYTES ABSOLUTE: 1.6 THOU/MM3 (ref 1–4.8)
MCH RBC QN AUTO: 31.9 PG (ref 26–33)
MCHC RBC AUTO-ENTMCNC: 32.8 GM/DL (ref 32.2–35.5)
MCV RBC AUTO: 97.4 FL (ref 81–99)
MONOCYTES # BLD: 12 %
MONOCYTES ABSOLUTE: 0.9 THOU/MM3 (ref 0.4–1.3)
NUCLEATED RED BLOOD CELLS: 0 /100 WBC
ORGANISM: ABNORMAL
PLATELET # BLD: 309 THOU/MM3 (ref 130–400)
PMV BLD AUTO: 10.2 FL (ref 9.4–12.4)
POTASSIUM REFLEX MAGNESIUM: 4.3 MEQ/L (ref 3.5–5.2)
RBC # BLD: 3.13 MILL/MM3 (ref 4.2–5.4)
SEG NEUTROPHILS: 63.2 %
SEGMENTED NEUTROPHILS ABSOLUTE COUNT: 4.7 THOU/MM3 (ref 1.8–7.7)
SODIUM BLD-SCNC: 131 MEQ/L (ref 135–145)
TOTAL PROTEIN: 6.6 G/DL (ref 6.1–8)
URINE CULTURE REFLEX: ABNORMAL
WBC # BLD: 7.4 THOU/MM3 (ref 4.8–10.8)

## 2022-10-02 PROCEDURE — 6370000000 HC RX 637 (ALT 250 FOR IP): Performed by: STUDENT IN AN ORGANIZED HEALTH CARE EDUCATION/TRAINING PROGRAM

## 2022-10-02 PROCEDURE — 51798 US URINE CAPACITY MEASURE: CPT

## 2022-10-02 PROCEDURE — 6370000000 HC RX 637 (ALT 250 FOR IP)

## 2022-10-02 PROCEDURE — 80053 COMPREHEN METABOLIC PANEL: CPT

## 2022-10-02 PROCEDURE — 99239 HOSP IP/OBS DSCHRG MGMT >30: CPT | Performed by: INTERNAL MEDICINE

## 2022-10-02 PROCEDURE — 82948 REAGENT STRIP/BLOOD GLUCOSE: CPT

## 2022-10-02 PROCEDURE — 85025 COMPLETE CBC W/AUTO DIFF WBC: CPT

## 2022-10-02 PROCEDURE — 36415 COLL VENOUS BLD VENIPUNCTURE: CPT

## 2022-10-02 RX ORDER — CEFDINIR 300 MG/1
300 CAPSULE ORAL EVERY 12 HOURS SCHEDULED
Qty: 3 CAPSULE | Refills: 0 | Status: ON HOLD | OUTPATIENT
Start: 2022-10-02 | End: 2022-10-07 | Stop reason: HOSPADM

## 2022-10-02 RX ORDER — METFORMIN HYDROCHLORIDE 500 MG/1
TABLET, EXTENDED RELEASE ORAL
Qty: 270 TABLET | Refills: 3 | Status: SHIPPED | OUTPATIENT
Start: 2022-10-02

## 2022-10-02 RX ADMIN — CEFDINIR 300 MG: 300 CAPSULE ORAL at 10:17

## 2022-10-02 RX ADMIN — LISINOPRIL 30 MG: 20 TABLET ORAL at 10:17

## 2022-10-02 RX ADMIN — ASPIRIN 81 MG: 81 TABLET, COATED ORAL at 10:17

## 2022-10-02 NOTE — CARE COORDINATION
10/2/22, 1:12 PM EDT    Patient goals/plan/ treatment preferences discussed by  and . Patient goals/plan/ treatment preferences reviewed with patient/ family. Patient/ family verbalize understanding of discharge plan and are in agreement with goal/plan/treatment preferences. Understanding was demonstrated using the teach back method. AVS provided by RN at time of discharge, which includes all necessary medical information pertaining to the patients current course of illness, treatment, post-discharge goals of care, and treatment preferences. Services At/After Discharge: None       IMM Letter  IMM Letter given to Patient/Family/Significant other/Guardian/POA/by[de-identified] Justin Mondragon Mgr.   IMM Letter date given[de-identified] 10/01/22  IMM Letter time given[de-identified] 0935  Observation Status Letter date given[de-identified] 09/29/22  Observation Status Letter time given[de-identified] 46  Observation Status Letter given to Patient/Family/Significant other/Guardian/POA/by[de-identified] Pt. Access

## 2022-10-02 NOTE — DISCHARGE SUMMARY
.    Internal Medicine Resident Discharge Summary      Patient Identification:   Esthela Nichole   : 1926  MRN: [id number]   Account: [de-identified]      Patient's PCP: Rhina Long MD    Admit Date: 2022     Discharge Date:   10/02/2022    Admitting Physician: Arturo Royal MD     Discharge Physician: Ame Mueller MD       Hospital Course:     Per chart, \"44 yo F with history NIDDMII, HLD, and recently noted left facial mole undergoing work up for possible malignancy, brought in to [facility] ED per son due to notable acute confusion overnight. On evaluation, per patient's two sons present at bedside, patient was at her baseline until around 3AM when son checked on her, gave some water with Tylenol, and noted her to cough. Patient was restless and did not sleep well since then. Son also noted her to be confused and not doing things right as her routine this morning which prompted this ED visit. Son states, she was unable to recognize him and his brother, unable to tell her birth date, and did not know how to wear her dentures, all of which is not her normal. Has been at baseline recently. No recent URI, complaints of chest pain, dyspnea, change in activity level, diarrhea, dysuria. Had a UTI last year and had a fall sustaining back back injury ~1.5 year. Has had a \"foggy mind\" since then per son but all symptoms since this morning has been new. No prior stroke or MI. Lifetime nonsmoker, no alcohol or IVDU history. Lives with son. Patient reports no symptoms including subjective fever, nausea, chest pain, dyspnea, dizziness, abdominal pain, dysuria. Oriented to self, place, and person now. Knows son brought her in to the hospital but unable to recall details. Knows birth date and recognized sons at bedside. ED: afebrile, hemodynamically stable but notable for tachypnea RR 28, saturating well on RA. NIHSS 2. STROKE ALERT called due to confusion. POC Gluc 137.   CT head wo contrast with no acute abnormality. CTA head/neck showed moderate internal carotid artery stenosis, otherwise no significant stenosis. Labs notable for Na 130, K 5.3, Cl 5.3, BUN 14, Cr 1.0 with eGFR 51 (previous 0.8 on 06/2022), WBC 17.9, Hb 10. Given 1L IVNS bolus with Rocephin x1 dose. On-call interventional neurologist team recommended MRI for further evaluation. Patient was outside of the window for [facility] and intervention. Admitted to hospitalist team for further evaluation/management. \"        9/30:   Continuing w. Rocephin 1g for UTI, cultures are pending. Neurology signed off. Gentle IVF given for drop in GFR. Pt. was having hhypoglycemic episodes, and amaryl was held. Troponin downtrending. 10/01:  Continuing w. Rocephin 1g for UTI. Troponin equivocal.    10/02:  Switched to Cefdinir 300mg PO. Stevens catheter being taken out. Discharging today. Discharge Diagnoses:  - Acute Metabolic Encephalopathy 2/2 UTI (resolved)  - UTI  - Elevated Troponin 2/2 demand ischemia   - CKD stage 3a  - NIDDMII, controlled  - HTN  - HLD  - Left facial mole s/p biopsy  - Nodular density, right mid lung      The patient was seen and examined on day of discharge and this discharge summary is in conjunction with any daily progress note from day of discharge. The patient is discharged in stable condition.      Exam:     Vitals:  Vitals:    10/01/22 2016 10/01/22 2312 10/02/22 0309 10/02/22 1209   BP: (!) 170/77 (!) 142/90 (!) 140/68 137/62   Pulse: 87 (!) 104 (!) 103 95   Resp: 16 17 16 16   Temp: 98.1 °F (36.7 °C) 98.2 °F (36.8 °C) 98 °F (36.7 °C) 98.2 °F (36.8 °C)   TempSrc: Oral Oral Oral Oral   SpO2: 98% 96% 95% 95%   Weight:       Height:         Weight: Weight: 96 lb (43.5 kg)     24 hour intake/output:  Intake/Output Summary (Last 24 hours) at 10/2/2022 1217  Last data filed at 10/2/2022 1047  Gross per 24 hour   Intake 300 ml   Output 800 ml   Net -500 ml       General appearance: No apparent distress, well developed, appears stated age.  Eyes:  PERRL. HENT: Head normal in appearance. External nares normal.  Oral mucosa moist without lesions. Hearing grossly intact. Neck: Supple, with full range of motion. Trachea midline. No gross JVD appreciated. Respiratory:  Normal effort. Clear to auscultation, bilaterally without rales or wheezes or rhonchi. Cardiovascular: Normal rate, regular rhythm with normal S1/S2 without murmurs. No lower extremity edema. Abdomen: Soft, non-tender, non-distended with normal bowel sounds. Musculoskeletal: There is no joint swelling or tenderness. Normal tone. No abnormal movements. Skin: Warm and dry. No rashes or lesions. Neurologic:  No focal sensory/motor deficits in the upper and lower extremities. Cranial nerves:  grossly non-focal 2-12. Psychiatric: Alert and oriented, normal insight and thought content. Capillary Refill: Brisk,< 3 seconds. Peripheral Pulses: +2 palpable, equal bilaterally. Labs: For convenience and continuity at follow-up the following most recent labs are provided:    CBC:    Lab Results   Component Value Date/Time    WBC 7.4 10/02/2022 05:12 AM    HGB 10.0 10/02/2022 05:12 AM    HCT 30.5 10/02/2022 05:12 AM     10/02/2022 05:12 AM       Renal:    Lab Results   Component Value Date/Time     10/02/2022 05:12 AM    K 4.3 10/02/2022 05:12 AM    CL 96 10/02/2022 05:12 AM    CO2 23 10/02/2022 05:12 AM    BUN 15 10/02/2022 05:12 AM    CREATININE 0.6 10/02/2022 05:12 AM    CALCIUM 9.1 10/02/2022 05:12 AM         Significant Diagnostic Studies    Radiology:   US RENAL COMPLETE   Final Result   1. Mild bilateral hydronephrosis, left greater than right. 2. Large urinary bladder volume with 1.4 L of fluid. There is debris    present within the urinary bladder, nonspecific. This document has been electronically signed by: Luis Carlos Alexandre MD on    09/29/2022 08:00 PM      XR CHEST PORTABLE   Final Result   1.  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 Kyphotic positioning of the patient. 2. Normal heart size. Moderately ectatic aortic arch. 2. Small noncalcified nodular density right midlung laterally, 9 mm in diameter, possibly a noncalcified granuloma. If further evaluation is desired, CT thorax would be helpful. **This report has been created using voice recognition software. It may contain minor errors which are inherent in voice recognition technology. **      Final report electronically signed by Dr. Evelyn Almonte on 9/29/2022 11:39 AM      CTA HEAD W WO CONTRAST (CODE STROKE)   Final Result       1. Mural calcification in the clinoid segments of internal carotid arteries with moderate stenosis. 2. Calcified mural plaque at the bilateral carotid bifurcations with tandem lesion in the left proximal internal carotid artery with 37% stenosis. There is no hemodynamic likely significant stenosis on the right by NASCET criteria. **This report has been created using voice recognition software. It may contain minor errors which are inherent in voice recognition technology. **      Final report electronically signed by Dr. Varsha Chi MD on 9/29/2022 11:55 AM      CTA NECK W WO CONTRAST (CODE STROKE)   Final Result       1. Mural calcification in the clinoid segments of internal carotid arteries with moderate stenosis. 2. Calcified mural plaque at the bilateral carotid bifurcations with tandem lesion in the left proximal internal carotid artery with 37% stenosis. There is no hemodynamic likely significant stenosis on the right by NASCET criteria. **This report has been created using voice recognition software. It may contain minor errors which are inherent in voice recognition technology. **      Final report electronically signed by Dr. Varsha Chi MD on 9/29/2022 11:55 AM      CT HEAD WO CONTRAST   Final Result   1. No acute intracranial abnormality.  The finding(s) was called to  Laverne Rdz at 1127 hours on 9/29/2022 by Dr. Heavenly León. Verbal acknowledgment and readback was given. **This report has been created using voice recognition software. It may contain minor errors which are inherent in voice recognition technology. **      Final report electronically signed by Dr Karen Chan on 9/29/2022 11:28 AM             Consults:     Heriberto SETH CONSULT TO DIETITIAN    Disposition: Home  Condition at Discharge: Stable    Code Status:  DNR-CC  Patient Instructions:    Discharge lab work: BMP on 10/05/22  Activity: activity as tolerated  Diet: ADULT DIET; Dysphagia - Soft and Bite Sized; Low Sodium (2 gm)  ADULT ORAL NUTRITION SUPPLEMENT; Breakfast, Lunch, Dinner; Diabetic Oral Supplement  ADULT ORAL NUTRITION SUPPLEMENT; Dinner, Lunch; Wound Healing Oral Supplement      Follow-up visits:   No follow-up provider specified.        Discharge Medications:        Medication List        CONTINUE taking these medications      FreeStyle Lancets Misc  1 each by Does not apply route daily Dx: E11.29, check bs daily     Wheelchair Misc  R40.4, R53.1, H54.10            ASK your doctor about these medications      ALEVE PO     aspirin EC 81 MG EC tablet     CLARITIN PO     FREESTYLE LITE strip  Generic drug: blood glucose test strips  1 each by In Vitro route daily Dx: E11.29, check BS daily     glimepiride 2 MG tablet  Commonly known as: AMARYL  Take 1 tablet by mouth every morning (before breakfast)     lisinopril 30 MG tablet  Commonly known as: PRINIVIL;ZESTRIL  Take 1 tablet by mouth daily     metFORMIN 500 MG extended release tablet  Commonly known as: GLUCOPHAGE-XR  Take 3 tabs with supper     multivitamin per tablet     pravastatin 40 MG tablet  Commonly known as: Pravachol  Take 1 tablet by mouth every evening                   Time Spent on discharge is 45 minutes in the examination, evaluation, counseling and review of medications and discharge plan. Thank you Simon Urena MD for the opportunity to be involved in this patient's care.       Signed:    Electronically signed by Chel Lau MD  PGY-1 Internal Medicine Resident on 10/2/22 at 12:17 PM EDT     Case was discussed with Attending, Dr. Josy Macias

## 2022-10-02 NOTE — PROCEDURES
A Bladder scan was performed at 1705   The residual amount was measured to be 382 ML. Report of results was given to [facility].

## 2022-10-02 NOTE — PLAN OF CARE
Problem: Discharge Planning  Goal: Discharge to home or other facility with appropriate resources  Outcome: Progressing  Flowsheets (Taken 10/1/2022 2234)  Discharge to home or other facility with appropriate resources:   Identify barriers to discharge with patient and caregiver   Identify discharge learning needs (meds, wound care, etc)   Arrange for needed discharge resources and transportation as appropriate     Problem: Safety - Adult  Goal: Free from fall injury  Outcome: Progressing  Flowsheets (Taken 10/1/2022 2234)  Free From Fall Injury:   Instruct family/caregiver on patient safety   Based on caregiver fall risk screen, instruct family/caregiver to ask for assistance with transferring infant if caregiver noted to have fall risk factors     Problem: ABCDS Injury Assessment  Goal: Absence of physical injury  Outcome: Progressing  Flowsheets (Taken 10/1/2022 2234)  Absence of Physical Injury: Implement safety measures based on patient assessment     Problem: Confusion  Goal: Confusion, delirium, dementia, or psychosis is improved or at baseline  Description: INTERVENTIONS:  1. Assess for possible contributors to thought disturbance, including medications, impaired vision or hearing, underlying metabolic abnormalities, dehydration, psychiatric diagnoses, and notify attending LIP  2. [place] high risk fall precautions, as indicated  3. Provide frequent short contacts to provide reality reorientation, refocusing and direction  4. Decrease environmental stimuli, including noise as appropriate  5. Monitor and intervene to maintain adequate nutrition, hydration, elimination, sleep and activity  6. If unable to ensure safety without constant attention obtain sitter and review sitter guidelines with assigned personnel  7.  Initiate Psychosocial CNS and Spiritual Care consult, as indicated  Outcome: Progressing  Flowsheets (Taken 10/1/2022 2234)  Effect of thought disturbance (confusion, delirium, dementia, or psychosis) are managed with adequate functional status:   Assess for contributors to thought disturbance, including medications, impaired vision or hearing, underlying metabolic abnormalities, dehydration, psychiatric diagnoses, notify [place] high risk fall precautions, as indicated     Problem: Chronic Conditions and Co-morbidities  Goal: Patient's chronic conditions and co-morbidity symptoms are monitored and maintained or improved  Outcome: Progressing  Flowsheets (Taken 10/1/2022 2234)  Care Plan - Patient's Chronic Conditions and Co-Morbidity Symptoms are Monitored and Maintained or Improved:   Monitor and assess patient's chronic conditions and comorbid symptoms for stability, deterioration, or improvement   Collaborate with multidisciplinary team to address chronic and comorbid conditions and prevent exacerbation or deterioration   Update acute care plan with appropriate goals if chronic or comorbid symptoms are exacerbated and prevent overall improvement and discharge     Problem: Nutrition Deficit:  Goal: Optimize nutritional status  Outcome: Progressing  Flowsheets (Taken 10/1/2022 2234)  Nutrient intake appropriate for improving, restoring, or maintaining nutritional needs:   Assess nutritional status and recommend course of action   Monitor oral intake, labs, and treatment plans   Provide specific nutrition education to patient or family as appropriate

## 2022-10-03 ENCOUNTER — HOSPITAL ENCOUNTER (INPATIENT)
Age: 87
LOS: 4 days | Discharge: HOME HEALTH | DRG: 643 | End: 2022-10-07
Attending: EMERGENCY MEDICINE | Admitting: INTERNAL MEDICINE
Payer: MEDICARE

## 2022-10-03 ENCOUNTER — CARE COORDINATION (OUTPATIENT)
Dept: CASE MANAGEMENT | Age: 87
End: 2022-10-03

## 2022-10-03 DIAGNOSIS — R55 NEAR SYNCOPE: ICD-10-CM

## 2022-10-03 DIAGNOSIS — R41.82 ALTERED MENTAL STATUS, UNSPECIFIED ALTERED MENTAL STATUS TYPE: Primary | ICD-10-CM

## 2022-10-03 DIAGNOSIS — E87.1 HYPONATREMIA: Primary | ICD-10-CM

## 2022-10-03 LAB
ALBUMIN SERPL-MCNC: 3.3 G/DL (ref 3.5–5.1)
ALP BLD-CCNC: 102 U/L (ref 38–126)
ALT SERPL-CCNC: 17 U/L (ref 11–66)
ANION GAP SERPL CALCULATED.3IONS-SCNC: 13 MEQ/L (ref 8–16)
AST SERPL-CCNC: 39 U/L (ref 5–40)
BASOPHILS # BLD: 0.2 %
BASOPHILS ABSOLUTE: 0 THOU/MM3 (ref 0–0.1)
BILIRUB SERPL-MCNC: 0.3 MG/DL (ref 0.3–1.2)
BUN BLDV-MCNC: 20 MG/DL (ref 7–22)
CALCIUM SERPL-MCNC: 9.5 MG/DL (ref 8.5–10.5)
CHLORIDE BLD-SCNC: 90 MEQ/L (ref 98–111)
CO2: 23 MEQ/L (ref 23–33)
CREAT SERPL-MCNC: 0.6 MG/DL (ref 0.4–1.2)
EKG ATRIAL RATE: 91 BPM
EKG P AXIS: 66 DEGREES
EKG P-R INTERVAL: 180 MS
EKG Q-T INTERVAL: 356 MS
EKG QRS DURATION: 72 MS
EKG QTC CALCULATION (BAZETT): 437 MS
EKG R AXIS: 34 DEGREES
EKG T AXIS: 48 DEGREES
EKG VENTRICULAR RATE: 91 BPM
EOSINOPHIL # BLD: 0.2 %
EOSINOPHILS ABSOLUTE: 0 THOU/MM3 (ref 0–0.4)
ERYTHROCYTE [DISTWIDTH] IN BLOOD BY AUTOMATED COUNT: 12.4 % (ref 11.5–14.5)
ERYTHROCYTE [DISTWIDTH] IN BLOOD BY AUTOMATED COUNT: 42.8 FL (ref 35–45)
GFR SERPL CREATININE-BSD FRML MDRD: > 90 ML/MIN/1.73M2
GLUCOSE BLD-MCNC: 210 MG/DL (ref 70–108)
GLUCOSE BLD-MCNC: 246 MG/DL (ref 70–108)
GLUCOSE BLD-MCNC: 303 MG/DL (ref 70–108)
HCT VFR BLD CALC: 32.1 % (ref 37–47)
HEMOGLOBIN: 10.7 GM/DL (ref 12–16)
IMMATURE GRANS (ABS): 0.05 THOU/MM3 (ref 0–0.07)
IMMATURE GRANULOCYTES: 0.4 %
LYMPHOCYTES # BLD: 7.5 %
LYMPHOCYTES ABSOLUTE: 0.9 THOU/MM3 (ref 1–4.8)
MCH RBC QN AUTO: 31.6 PG (ref 26–33)
MCHC RBC AUTO-ENTMCNC: 33.3 GM/DL (ref 32.2–35.5)
MCV RBC AUTO: 94.7 FL (ref 81–99)
MONOCYTES # BLD: 10.1 %
MONOCYTES ABSOLUTE: 1.3 THOU/MM3 (ref 0.4–1.3)
NUCLEATED RED BLOOD CELLS: 0 /100 WBC
OSMOLALITY CALCULATION: 262.2 MOSMOL/KG (ref 275–300)
PLATELET # BLD: 309 THOU/MM3 (ref 130–400)
PMV BLD AUTO: 9.5 FL (ref 9.4–12.4)
POTASSIUM SERPL-SCNC: 5.2 MEQ/L (ref 3.5–5.2)
RBC # BLD: 3.39 MILL/MM3 (ref 4.2–5.4)
SARS-COV-2, NAAT: NOT  DETECTED
SEG NEUTROPHILS: 81.6 %
SEGMENTED NEUTROPHILS ABSOLUTE COUNT: 10.3 THOU/MM3 (ref 1.8–7.7)
SODIUM BLD-SCNC: 126 MEQ/L (ref 135–145)
TOTAL PROTEIN: 6.6 G/DL (ref 6.1–8)
TROPONIN T: 0.05 NG/ML
WBC # BLD: 12.6 THOU/MM3 (ref 4.8–10.8)

## 2022-10-03 PROCEDURE — 82948 REAGENT STRIP/BLOOD GLUCOSE: CPT

## 2022-10-03 PROCEDURE — 80053 COMPREHEN METABOLIC PANEL: CPT

## 2022-10-03 PROCEDURE — 2580000003 HC RX 258

## 2022-10-03 PROCEDURE — 93005 ELECTROCARDIOGRAM TRACING: CPT | Performed by: EMERGENCY MEDICINE

## 2022-10-03 PROCEDURE — 99285 EMERGENCY DEPT VISIT HI MDM: CPT

## 2022-10-03 PROCEDURE — 84484 ASSAY OF TROPONIN QUANT: CPT

## 2022-10-03 PROCEDURE — 36415 COLL VENOUS BLD VENIPUNCTURE: CPT

## 2022-10-03 PROCEDURE — 1111F DSCHRG MED/CURRENT MED MERGE: CPT | Performed by: FAMILY MEDICINE

## 2022-10-03 PROCEDURE — 99223 1ST HOSP IP/OBS HIGH 75: CPT | Performed by: INTERNAL MEDICINE

## 2022-10-03 PROCEDURE — 93010 ELECTROCARDIOGRAM REPORT: CPT | Performed by: INTERNAL MEDICINE

## 2022-10-03 PROCEDURE — 6360000002 HC RX W HCPCS

## 2022-10-03 PROCEDURE — 84295 ASSAY OF SERUM SODIUM: CPT

## 2022-10-03 PROCEDURE — 87635 SARS-COV-2 COVID-19 AMP PRB: CPT

## 2022-10-03 PROCEDURE — 85025 COMPLETE CBC W/AUTO DIFF WBC: CPT

## 2022-10-03 PROCEDURE — 2580000003 HC RX 258: Performed by: EMERGENCY MEDICINE

## 2022-10-03 PROCEDURE — 6370000000 HC RX 637 (ALT 250 FOR IP)

## 2022-10-03 PROCEDURE — 1200000003 HC TELEMETRY R&B

## 2022-10-03 RX ORDER — INSULIN LISPRO 100 [IU]/ML
0.08 INJECTION, SOLUTION INTRAVENOUS; SUBCUTANEOUS
Status: DISCONTINUED | OUTPATIENT
Start: 2022-10-03 | End: 2022-10-03

## 2022-10-03 RX ORDER — ASPIRIN 81 MG/1
81 TABLET ORAL DAILY
Status: DISCONTINUED | OUTPATIENT
Start: 2022-10-04 | End: 2022-10-07 | Stop reason: HOSPADM

## 2022-10-03 RX ORDER — ACETAMINOPHEN 325 MG/1
650 TABLET ORAL EVERY 6 HOURS PRN
Status: DISCONTINUED | OUTPATIENT
Start: 2022-10-03 | End: 2022-10-07 | Stop reason: HOSPADM

## 2022-10-03 RX ORDER — SODIUM CHLORIDE 0.9 % (FLUSH) 0.9 %
5-40 SYRINGE (ML) INJECTION EVERY 12 HOURS SCHEDULED
Status: DISCONTINUED | OUTPATIENT
Start: 2022-10-03 | End: 2022-10-07 | Stop reason: HOSPADM

## 2022-10-03 RX ORDER — INSULIN LISPRO 100 [IU]/ML
0-4 INJECTION, SOLUTION INTRAVENOUS; SUBCUTANEOUS EVERY 4 HOURS
Status: DISCONTINUED | OUTPATIENT
Start: 2022-10-03 | End: 2022-10-03

## 2022-10-03 RX ORDER — 0.9 % SODIUM CHLORIDE 0.9 %
500 INTRAVENOUS SOLUTION INTRAVENOUS ONCE
Status: COMPLETED | OUTPATIENT
Start: 2022-10-03 | End: 2022-10-03

## 2022-10-03 RX ORDER — ONDANSETRON 4 MG/1
4 TABLET, ORALLY DISINTEGRATING ORAL EVERY 8 HOURS PRN
Status: DISCONTINUED | OUTPATIENT
Start: 2022-10-03 | End: 2022-10-07 | Stop reason: HOSPADM

## 2022-10-03 RX ORDER — INSULIN LISPRO 100 [IU]/ML
0-4 INJECTION, SOLUTION INTRAVENOUS; SUBCUTANEOUS
Status: DISCONTINUED | OUTPATIENT
Start: 2022-10-03 | End: 2022-10-05

## 2022-10-03 RX ORDER — SODIUM CHLORIDE 9 MG/ML
INJECTION, SOLUTION INTRAVENOUS CONTINUOUS
Status: ACTIVE | OUTPATIENT
Start: 2022-10-03 | End: 2022-10-04

## 2022-10-03 RX ORDER — SODIUM CHLORIDE 0.9 % (FLUSH) 0.9 %
5-40 SYRINGE (ML) INJECTION PRN
Status: DISCONTINUED | OUTPATIENT
Start: 2022-10-03 | End: 2022-10-07 | Stop reason: HOSPADM

## 2022-10-03 RX ORDER — CEFDINIR 300 MG/1
300 CAPSULE ORAL EVERY 12 HOURS SCHEDULED
Status: COMPLETED | OUTPATIENT
Start: 2022-10-03 | End: 2022-10-04

## 2022-10-03 RX ORDER — ENOXAPARIN SODIUM 100 MG/ML
30 INJECTION SUBCUTANEOUS DAILY
Status: DISCONTINUED | OUTPATIENT
Start: 2022-10-03 | End: 2022-10-07 | Stop reason: HOSPADM

## 2022-10-03 RX ORDER — SODIUM CHLORIDE 9 MG/ML
INJECTION, SOLUTION INTRAVENOUS PRN
Status: DISCONTINUED | OUTPATIENT
Start: 2022-10-03 | End: 2022-10-07 | Stop reason: HOSPADM

## 2022-10-03 RX ORDER — ONDANSETRON 2 MG/ML
4 INJECTION INTRAMUSCULAR; INTRAVENOUS EVERY 6 HOURS PRN
Status: DISCONTINUED | OUTPATIENT
Start: 2022-10-03 | End: 2022-10-07 | Stop reason: HOSPADM

## 2022-10-03 RX ORDER — INSULIN LISPRO 100 [IU]/ML
0-4 INJECTION, SOLUTION INTRAVENOUS; SUBCUTANEOUS NIGHTLY
Status: DISCONTINUED | OUTPATIENT
Start: 2022-10-03 | End: 2022-10-05

## 2022-10-03 RX ORDER — INSULIN LISPRO 100 [IU]/ML
0-4 INJECTION, SOLUTION INTRAVENOUS; SUBCUTANEOUS NIGHTLY
Status: DISCONTINUED | OUTPATIENT
Start: 2022-10-03 | End: 2022-10-03

## 2022-10-03 RX ORDER — INSULIN LISPRO 100 [IU]/ML
0-4 INJECTION, SOLUTION INTRAVENOUS; SUBCUTANEOUS
Status: DISCONTINUED | OUTPATIENT
Start: 2022-10-03 | End: 2022-10-03

## 2022-10-03 RX ORDER — POLYETHYLENE GLYCOL 3350 17 G/17G
17 POWDER, FOR SOLUTION ORAL DAILY PRN
Status: DISCONTINUED | OUTPATIENT
Start: 2022-10-03 | End: 2022-10-07 | Stop reason: HOSPADM

## 2022-10-03 RX ORDER — INSULIN GLARGINE 100 [IU]/ML
0.25 INJECTION, SOLUTION SUBCUTANEOUS NIGHTLY
Status: DISCONTINUED | OUTPATIENT
Start: 2022-10-03 | End: 2022-10-03

## 2022-10-03 RX ORDER — PRAVASTATIN SODIUM 40 MG
40 TABLET ORAL EVERY EVENING
Status: DISCONTINUED | OUTPATIENT
Start: 2022-10-03 | End: 2022-10-07 | Stop reason: HOSPADM

## 2022-10-03 RX ORDER — DEXTROSE MONOHYDRATE 100 MG/ML
INJECTION, SOLUTION INTRAVENOUS CONTINUOUS PRN
Status: DISCONTINUED | OUTPATIENT
Start: 2022-10-03 | End: 2022-10-07 | Stop reason: HOSPADM

## 2022-10-03 RX ADMIN — SODIUM CHLORIDE: 9 INJECTION, SOLUTION INTRAVENOUS at 16:11

## 2022-10-03 RX ADMIN — PRAVASTATIN SODIUM 40 MG: 40 TABLET ORAL at 21:05

## 2022-10-03 RX ADMIN — SODIUM CHLORIDE 500 ML: 9 INJECTION, SOLUTION INTRAVENOUS at 13:58

## 2022-10-03 RX ADMIN — CEFDINIR 300 MG: 300 CAPSULE ORAL at 21:05

## 2022-10-03 RX ADMIN — ENOXAPARIN SODIUM 30 MG: 100 INJECTION SUBCUTANEOUS at 16:11

## 2022-10-03 RX ADMIN — INSULIN LISPRO 4 UNITS: 100 INJECTION, SOLUTION INTRAVENOUS; SUBCUTANEOUS at 21:12

## 2022-10-03 ASSESSMENT — PAIN - FUNCTIONAL ASSESSMENT
PAIN_FUNCTIONAL_ASSESSMENT: NONE - DENIES PAIN
PAIN_FUNCTIONAL_ASSESSMENT: NONE - DENIES PAIN

## 2022-10-03 ASSESSMENT — PAIN SCALES - GENERAL: PAINLEVEL_OUTOF10: 0

## 2022-10-03 NOTE — ED NOTES
Pt transported from ED07 to 8B37 on cart in stable condition. Writer spoke to Anisha Preciado prior to transport.       Merritt Jones  10/03/22 0036

## 2022-10-03 NOTE — ED NOTES
ED to inpatient nurses report    Chief Complaint   Patient presents with    Fatigue    Urinary Tract Infection      Present to ED from home  LOC: alert and orientated to name, place, date  Vital signs   Vitals:    10/03/22 1129 10/03/22 1314 10/03/22 1357   BP: 117/63  134/61   Pulse: 92 87 86   Resp: 17 16 17   Temp: 98.7 °F (37.1 °C)     TempSrc: Oral     SpO2: 96% 97% 96%   Weight: 96 lb (43.5 kg)        Oxygen Baseline room air WNL    Current needs required room air WNL Bipap/Cpap No  LDAs:   Peripheral IV 10/03/22 Right Forearm (Active)   Site Assessment Clean, dry & intact 10/03/22 1356   Line Status Blood return noted; Flushed; Infusing 10/03/22 1356   Phlebitis Assessment No symptoms 10/03/22 1356   Infiltration Assessment 0 10/03/22 1356   Dressing Status Clean, dry & intact 10/03/22 1356   Dressing Intervention New 10/03/22 1356     Mobility: Requires assistance * 2      C-SSRS Risk of Suicide: No Risk  Swallow Screening    Preferred Language: English     Electronically signed by Cherelle Albrecht, RN on 10/3/2022 at 2:32 Lukasz Mackenzie RN  10/03/22 7013

## 2022-10-03 NOTE — H&P
Internal Medicine Resident History and Physical          Patient: Enmanuel Done  : 1926  MRN: [id number]     Acct: [de-identified]    PCP: Ariel Hoffmann MD  Date of Admission: 10/3/2022  Date of Service: Pt seen/examined on 10/03/22  and Admitted to Inpatient with expected LOS greater than two midnights due to medical therapy. Hospital Problems             Last Modified POA    * (Principal) Hyponatremia 10/3/2022 Yes       Assessment and Plan:  Dehydration  Patient's son stated that after discharge patient had very little fluid intake, was mostly sleeping and had a large bowel movement this a.m. This morning BP was checked and son states it was low, informed hospital nurse on phone about symptoms and was told to call EMS. POA Na+ of 126 and K+ of 5.2. Serum osmolality of 262.2. Was given 500 mL of bolus IV NS in ED. Started on IV NS at 50 mL/hour  Acute metabolic encephalopathy 2/2 UTI on treatment  Patient was admitted on  UA at that time showed large LE and many bacteria. Urine culture was positive for enteric gram-negative bacilli. She was started on Rocephin 1 g for 3 days. Switch to p.o. cefdinir 300 mg on 10/02 for 2 more days. On this admission patient's WBC remains elevated at 12.6. Will finish off antibiotic course of cefdinir 300 mg on 10/4. Chronic troponinemia 2/2 possible demand ischemia  On last admission patient's troponin was 0.039-0.052. Troponin was equivocal with no ST-T wave changes on EKG. On POA patient's troponin was 0.054, patient had no CP or SOB. EKG showed NSR with PACs  CKD   On POA GFR > 90, BUN/CR of 20/0.6. Patient does take daily Aleve for arthritis, has been taking this medication for the last 20 years and it relieves the pain. Daily BMP monitoring  Noninsulin-dependent DM II  HbA1c of 7.1 on . On last discharge Amaryl 2 mg was discontinued and metformin was recommended to be taken in the morning. On POA glucose of 210.   Started on low-dose SSI  Hypertension  This morning BP was checked by son at home and he states it was low, informed hospital nurse on phone about symptoms and was told to call EMS. POA BP was 117/63 with HR of 92  Home med of lisinopril 30 mg currently held for possible hypotension. Give parameters placed  Will reassess tomorrow for need of BP meds  Hyperlipidemia  Home med of pravastatin 40 mg resumed  Left facial mole s/p biopsy:  Serology results pending. Lesion is healing  If malignancy is present surgical excision will be done on 10/26/2022  Lesion is around 1 cm. Follows up with Dr. Thais Welch. Right midlung nodular density:  Small noncalcified nodular density in right midlung around 9 mm diameter  Outpatient follow-up, with possible CT chest      =======================================================================      Chief Complaint: Weakness and low blood pressure    History Of Present Illness:  Enmanuel Vanegas is a 80 y.o. female with PMHx of CKD stage IIIa, NIDDMII, HTN, HLD, and recent left facial mole undergoing work-up for possible malignancy who was recently discharged on 10/2/2022 presents to [address] [place] [address] due to son being concerned for weakness and possible low blood pressure. She was recently admitted to [address] [place] [address] on 9/29/2022 and discharged on 10/02/2022. Patient was brought in by son for complaints of confusion and decreased mental mentation. Upon admission patient was AO x4. She was found to have a UTI and was started on Rocephin 1 g which was switched over to cefdinir 300 mg. Sodium was also found to be slightly on the lower side at 130. She also had elevated troponin that remained mildly elevated with no chest pain shortness of breath or ST-T wave changes on EKG. She was discharged on 10/02 once patient was stable and on cefdinir 300 mg for for 2 days. Antibiotic course will end tomorrow night.     After being discharged yesterday, patient went home and slept for many hours had only half a glass of drink and no meal.  This morning patient had her medications and had a large bowel movement. Hospital nurse called and patient son stated that patient had low BP and sugars were in the high 100s, and patient was in and out of consciousness. Was recommended to call EMS to bring patient in. In the ED, patient was afebrile, heart rate of 92, BP of 117/63, 96% SPO2 on room air. Labs are concerning for Na+ of 126, K+ of 5.2, chloride of 90, glucose of 210. Serum osmolality was 262.2. Troponin was 0.054. CBC shows a WBC of 12.6, H&H of 10.7/32.1 and platelets of 029. EKG shows NSR with PACs. She was given 500 mL of bolus IV normal saline. Admitted to inpatient services for further work-up.  and palliative care are on board for further management upon discharge. Past Medical History:        Diagnosis Date    Broken leg 1982    Lt    Controlled type 2 diabetes mellitus with microalbuminuria, without long-term current use of insulin ([facility])     Hyperlipidemia     Persistent proteinuria associated with type 2 diabetes mellitus ([facility] [address]     Varicose veins        Past Surgical History:        Procedure Laterality Date    EYE SURGERY         Medications Prior to Admission:   Prior to Admission medications    Medication Sig Start Date End Date Taking?  Authorizing Provider   metFORMIN (GLUCOPHAGE-XR) 500 MG extended release tablet Take 3 tabs with supper 10/2/22   Tanya Vega MD   cefdinir (OMNICEF) 300 MG capsule Take 1 capsule by mouth every 12 hours for 3 doses 10/2/22 10/4/22  Tanya Vega MD   pravastatin (PRAVACHOL) 40 MG tablet Take 1 tablet by mouth every evening 6/15/22   Erica Pryor MD   lisinopril (PRINIVIL;ZESTRIL) 30 MG tablet Take 1 tablet by mouth daily 6/15/22   Erica Pryor MD   blood glucose test strips (FREESTYLE LITE) strip 1 each by In Vitro route daily Dx: E11.29, check BS daily 6/15/22   Erica Pryor MD   FreeStyle Lancets MISC 1 each by Does not apply route daily Dx: E11.29, check bs daily 6/15/22   Stoney Powell MD   [facility]. Devices [facility]) MISC R40.4, R53.1, H54.10 5/14/21   Stoney Powell MD   aspirin EC 81 MG EC tablet Take 81 mg by mouth daily    Historical Provider, MD   multivitamin [facility]) per tablet Take 1 tablet by mouth daily. Historical Provider, MD   Naproxen Sodium (ALEVE PO) Take  by mouth daily as needed. Historical Provider, MD       Allergies:  Pcn [penicillins]    Social History:    The patient currently lives at home with son. .   Tobacco use:   reports that she has never smoked. She has never used smokeless tobacco.  Alcohol use:   reports no history of alcohol use. Drug use:  reports no history of drug use. Family History:         Problem Relation Age of Onset    Stroke Mother        Review of Systems:   Pertinent positives and negatives as noted in the HPI. Otherwise complete ROS negative. Physical Exam:    BP (!) 168/66   Pulse 90   Temp 97.8 °F (36.6 °C) (Oral)   Resp 18   Ht 4' 11\" (1.499 m)   Wt 95 lb (43.1 kg)   SpO2 96%   BMI 19.19 kg/m²       Physical Exam  HENT:      Head: Normocephalic and atraumatic. Right Ear: External ear normal.      Left Ear: External ear normal.      Nose: Nose normal. No congestion. Eyes:      Pupils: Pupils are equal, round, and reactive to light. Cardiovascular:      Rate and Rhythm: Normal rate and regular rhythm. Pulses: Normal pulses. Heart sounds: Normal heart sounds. Pulmonary:      Effort: Pulmonary effort is normal.      Breath sounds: Normal breath sounds. Abdominal:      General: Abdomen is flat. Bowel sounds are normal.      Palpations: Abdomen is soft. Tenderness: There is no abdominal tenderness. There is no guarding. Musculoskeletal:         General: Normal range of motion. Cervical back: Normal range of motion and neck supple. Skin:     General: Skin is warm.       Capillary Refill: Capillary refill takes 2 to 3 seconds. Neurological:      General: No focal deficit present. Mental Status: She is alert and oriented to person, place, and time. Mental status is at baseline. Cranial Nerves: No cranial nerve deficit. Motor: Weakness present. Labs:     Recent Labs     10/01/22  0539 10/02/22  0512 10/03/22  1252   WBC 8.1 7.4 12.6*   HGB 9.8* 10.0* 10.7*   HCT 29.7* 30.5* 32.1*    309 309     Recent Labs     10/01/22  0539 10/02/22  0512 10/03/22  1252    131* 126*   K 4.8 4.3 5.2    96* 90*   CO2 25 23 23   BUN 12 15 20   CREATININE 0.7 0.6 0.6   CALCIUM 9.1 9.1 9.5     Recent Labs     10/01/22  0539 10/02/22  0512 10/03/22  1252   AST 21 22 39   ALT 7* 9* 17   BILITOT <0.2* 0.2* 0.3   ALKPHOS 86 85 102     No results for input(s): INR in the last 72 hours. No results for input(s): Hamp Memory in the last 72 hours. Lab Results   Component Value Date/Time    NITRU NEGATIVE 09/29/2022 12:10 PM    WBCUA > 200 09/29/2022 12:10 PM    WBCUA 0-5 12/31/2021 09:45 PM    BACTERIA MANY 09/29/2022 12:10 PM    RBCUA 3-5 09/29/2022 12:10 PM    BLOODU SMALL 09/29/2022 12:10 PM    GLUCOSEU NEGATIVE 09/29/2022 12:10 PM         Radiology:     No orders to display              PT/OT Eval Status: orders placed   Diet: ADULT DIET; Regular; 5 carb choices (75 gm/meal)  DVT prophylaxis: Lovenox  Code Status: DNR-CC  Disposition: admit to Inpatient     Thank you Jolie Abrams MD for the opportunity to be involved in this patient's care. Electronically signed by Megan Rubin MD  PGY-1 Internal Medicine Resident   on 10/3/2022 at 5:25 PM.     Case discussed with Attending, Dr. Rhoda Cortez.

## 2022-10-03 NOTE — ED NOTES
Patient presents to the ED via LFD from home after son called 911 for concerns of feeling cold and clammy. Patient is alert and oriented x4. Denies any pain at this time. Patient states when she woke up she was feeling fatigued and tired. Medics report patient was recently discharged from an admission for treatment of a UTI. VSS.       Jadon Mackenzie RN  10/03/22 [id number]

## 2022-10-03 NOTE — ED NOTES
Unable to complete orthostatic VS due to weakness to stand. Son at bedside states patient usually uses a walker, but recently has been needing extra assistance and uses a wheelchair to get around. VSS at this time.       Vivian Mackenzie RN  10/03/22 7218

## 2022-10-03 NOTE — CARE COORDINATION
[facility] Care Transitions Initial Follow Up Call    Call within 2 business days of discharge: Yes    Care Transition Nurse contacted the family by telephone to perform post hospital discharge assessment. Verified name and  with family as identifiers. Provided introduction to self, and explanation of the Care Transition Nurse role. Patient: Flora Duarte Patient : 1926   MRN: [id number]  Reason for Admission: AMS  Discharge Date: 10/2/22 RARS: Readmission Risk Score: 14.1      Last Discharge  Street       Date Complaint Diagnosis Description Type Department Provider    22 Altered Mental Status Altered mental status, unspecified altered mental status type . .. ED to Hosp-Admission (Discharged) (ADMITTED) Rossi Luna MD; Sancho Darling. .. Challenges to be reviewed by the provider   Additional needs identified to be addressed with provider: Yes  Needs 7 day HFU               Method of communication with provider:  Son to call and schedule . CTN call today, spoke w/ son Dyana Tam today and he just helped Delia Shetty out of bed into a chair to eat breakfast. He said she slept all night. Meds reviewed and are correct. Writer asked for BP & FBS readings while on the phone. YFZ=167 BP=70/40  He asked if that was correct, he was instructed to take Bp again. He then  started calling Mom, Mom? And said Delia Shetty stopped breathing and would not respond. Writer instructed to hang up and call 911. Spoke w/ Dyana Tam @ 11:20am-> EMS transported Delia Shetty to [facility]. He said she did start to breathe. Care Transition Nurse reviewed red flags with family who verbalized understanding. The family was given an opportunity to ask questions and does not have any further questions or concerns at this time. Were discharge instructions available to patient? Yes. Reviewed appropriate site of care based on symptoms and resources available to patient including: PCP  When to call 911.  The family agrees to contact the PCP office for questions related to their healthcare. Advance Care Planning:   Does patient have an Advance Directive: not on file. Medication reconciliation was performed with family, who verbalizes understanding of administration of home medications. Medications reviewed, 1111F entered: yes    Was patient discharged with a pulse oximeter? no    Non-face-to-face services provided:  Scheduled appointment with PCP-son to call  Obtained and reviewed discharge summary and/or continuity of care documents  Reviewed and followed up on pending diagnostic tests and treatments-[facility]    Offered patient enrollment in the Remote Patient Monitoring (RPM) program for in-home monitoring: NA.    Care Transitions 24 Hour Call    Schedule Follow Up Appointment with PCP: Declined  Do you have a copy of your discharge instructions?: Yes  Do you have all of your prescriptions and are they filled?: Yes  Have you been contacted by a [facility] Pharmacist?: No  Have you scheduled your follow up appointment?: No  Do you feel like you have everything you need to keep you well at home?: Yes  Care Transitions Interventions  Disease Association: Completed               Follow Up  Future Appointments   Date Time Provider Heriberto Bynum   12/15/2022  9:30 AM SCHEDULE, 4 [address] Transition Nurse provided contact information. Pt. Transported to ED   based on severity of symptoms and risk factors. Plan for next call:  Pt.  Transported to ED    Chinedu Phoenix RN

## 2022-10-04 LAB
ANION GAP SERPL CALCULATED.3IONS-SCNC: 10 MEQ/L (ref 8–16)
BLOOD CULTURE, ROUTINE: NORMAL
BLOOD CULTURE, ROUTINE: NORMAL
BUN BLDV-MCNC: 18 MG/DL (ref 7–22)
CALCIUM SERPL-MCNC: 8.9 MG/DL (ref 8.5–10.5)
CHLORIDE BLD-SCNC: 92 MEQ/L (ref 98–111)
CO2: 25 MEQ/L (ref 23–33)
CREAT SERPL-MCNC: 0.6 MG/DL (ref 0.4–1.2)
ERYTHROCYTE [DISTWIDTH] IN BLOOD BY AUTOMATED COUNT: 12.3 % (ref 11.5–14.5)
ERYTHROCYTE [DISTWIDTH] IN BLOOD BY AUTOMATED COUNT: 42 FL (ref 35–45)
GFR SERPL CREATININE-BSD FRML MDRD: > 90 ML/MIN/1.73M2
GLUCOSE BLD-MCNC: 181 MG/DL (ref 70–108)
GLUCOSE BLD-MCNC: 191 MG/DL (ref 70–108)
GLUCOSE BLD-MCNC: 245 MG/DL (ref 70–108)
GLUCOSE BLD-MCNC: 301 MG/DL (ref 70–108)
GLUCOSE BLD-MCNC: 311 MG/DL (ref 70–108)
GLUCOSE BLD-MCNC: 320 MG/DL (ref 70–108)
GLUCOSE BLD-MCNC: 351 MG/DL (ref 70–108)
HCT VFR BLD CALC: 28 % (ref 37–47)
HEMOGLOBIN: 9.5 GM/DL (ref 12–16)
MAGNESIUM: 1.7 MG/DL (ref 1.6–2.4)
MCH RBC QN AUTO: 31.6 PG (ref 26–33)
MCHC RBC AUTO-ENTMCNC: 33.9 GM/DL (ref 32.2–35.5)
MCV RBC AUTO: 93 FL (ref 81–99)
OSMOLALITY: 283 MOSMOL/KG (ref 275–295)
PLATELET # BLD: 304 THOU/MM3 (ref 130–400)
PMV BLD AUTO: 9.7 FL (ref 9.4–12.4)
POTASSIUM SERPL-SCNC: 4.9 MEQ/L (ref 3.5–5.2)
RBC # BLD: 3.01 MILL/MM3 (ref 4.2–5.4)
REASON FOR REJECTION: NORMAL
REJECTED TEST: NORMAL
SODIUM BLD-SCNC: 123 MEQ/L (ref 135–145)
SODIUM BLD-SCNC: 125 MEQ/L (ref 135–145)
SODIUM BLD-SCNC: 127 MEQ/L (ref 135–145)
SODIUM BLD-SCNC: 128 MEQ/L (ref 135–145)
TSH SERPL DL<=0.05 MIU/L-ACNC: 3.04 UIU/ML (ref 0.4–4.2)
WBC # BLD: 11.1 THOU/MM3 (ref 4.8–10.8)

## 2022-10-04 PROCEDURE — 6370000000 HC RX 637 (ALT 250 FOR IP): Performed by: STUDENT IN AN ORGANIZED HEALTH CARE EDUCATION/TRAINING PROGRAM

## 2022-10-04 PROCEDURE — 80048 BASIC METABOLIC PNL TOTAL CA: CPT

## 2022-10-04 PROCEDURE — 97162 PT EVAL MOD COMPLEX 30 MIN: CPT

## 2022-10-04 PROCEDURE — 84443 ASSAY THYROID STIM HORMONE: CPT

## 2022-10-04 PROCEDURE — 83930 ASSAY OF BLOOD OSMOLALITY: CPT

## 2022-10-04 PROCEDURE — 6360000002 HC RX W HCPCS

## 2022-10-04 PROCEDURE — 83735 ASSAY OF MAGNESIUM: CPT

## 2022-10-04 PROCEDURE — 97530 THERAPEUTIC ACTIVITIES: CPT

## 2022-10-04 PROCEDURE — 85027 COMPLETE CBC AUTOMATED: CPT

## 2022-10-04 PROCEDURE — 84295 ASSAY OF SERUM SODIUM: CPT

## 2022-10-04 PROCEDURE — 1200000003 HC TELEMETRY R&B

## 2022-10-04 PROCEDURE — 82948 REAGENT STRIP/BLOOD GLUCOSE: CPT

## 2022-10-04 PROCEDURE — 2580000003 HC RX 258

## 2022-10-04 PROCEDURE — 36415 COLL VENOUS BLD VENIPUNCTURE: CPT

## 2022-10-04 PROCEDURE — 6370000000 HC RX 637 (ALT 250 FOR IP)

## 2022-10-04 RX ORDER — LISINOPRIL 20 MG/1
20 TABLET ORAL DAILY
Status: DISCONTINUED | OUTPATIENT
Start: 2022-10-04 | End: 2022-10-05

## 2022-10-04 RX ORDER — INSULIN GLARGINE 100 [IU]/ML
5 INJECTION, SOLUTION SUBCUTANEOUS NIGHTLY
Status: DISCONTINUED | OUTPATIENT
Start: 2022-10-04 | End: 2022-10-05

## 2022-10-04 RX ADMIN — ASPIRIN 81 MG: 81 TABLET, COATED ORAL at 09:12

## 2022-10-04 RX ADMIN — INSULIN LISPRO 1 UNITS: 100 INJECTION, SOLUTION INTRAVENOUS; SUBCUTANEOUS at 17:54

## 2022-10-04 RX ADMIN — ACETAMINOPHEN 650 MG: 325 TABLET ORAL at 08:59

## 2022-10-04 RX ADMIN — INSULIN LISPRO 3 UNITS: 100 INJECTION, SOLUTION INTRAVENOUS; SUBCUTANEOUS at 13:09

## 2022-10-04 RX ADMIN — SODIUM CHLORIDE, PRESERVATIVE FREE 5 ML: 5 INJECTION INTRAVENOUS at 21:31

## 2022-10-04 RX ADMIN — PRAVASTATIN SODIUM 40 MG: 40 TABLET ORAL at 21:26

## 2022-10-04 RX ADMIN — LISINOPRIL 20 MG: 20 TABLET ORAL at 13:09

## 2022-10-04 RX ADMIN — CEFDINIR 300 MG: 300 CAPSULE ORAL at 09:12

## 2022-10-04 RX ADMIN — ENOXAPARIN SODIUM 30 MG: 100 INJECTION SUBCUTANEOUS at 09:12

## 2022-10-04 RX ADMIN — INSULIN GLARGINE 5 UNITS: 100 INJECTION, SOLUTION SUBCUTANEOUS at 21:26

## 2022-10-04 RX ADMIN — INSULIN LISPRO 4 UNITS: 100 INJECTION, SOLUTION INTRAVENOUS; SUBCUTANEOUS at 21:23

## 2022-10-04 ASSESSMENT — PAIN SCALES - GENERAL
PAINLEVEL_OUTOF10: 1
PAINLEVEL_OUTOF10: 3
PAINLEVEL_OUTOF10: 1
PAINLEVEL_OUTOF10: 1
PAINLEVEL_OUTOF10: 3
PAINLEVEL_OUTOF10: 1
PAINLEVEL_OUTOF10: 0
PAINLEVEL_OUTOF10: 3
PAINLEVEL_OUTOF10: 0

## 2022-10-04 ASSESSMENT — PAIN - FUNCTIONAL ASSESSMENT
PAIN_FUNCTIONAL_ASSESSMENT: ACTIVITIES ARE NOT PREVENTED

## 2022-10-04 ASSESSMENT — PAIN DESCRIPTION - ORIENTATION
ORIENTATION: MID

## 2022-10-04 ASSESSMENT — PAIN DESCRIPTION - DESCRIPTORS
DESCRIPTORS: ACHING

## 2022-10-04 ASSESSMENT — PAIN DESCRIPTION - LOCATION
LOCATION: BACK

## 2022-10-04 NOTE — PLAN OF CARE
Problem: Discharge Planning  Goal: Discharge to home or other facility with appropriate resources  Outcome: Progressing  Flowsheets (Taken 10/4/2022 1612)  Discharge to home or other facility with appropriate resources: Identify barriers to discharge with patient and caregiver  Note: Plans to go home with son and home health at discharge. Problem: Safety - Adult  Goal: Free from fall injury  Outcome: Progressing  Flowsheets (Taken 10/4/2022 1612)  Free From Fall Injury: Instruct family/caregiver on patient safety  Note: Pt absent of  falls this shift. Falling star program within place. RN visual checks on pt hourly with rounds. Call light in reach, bed in lowest position. Fall band intact. Bed alarm set. Pt educated to not get up per self to reduce the risk for falls. Problem: ABCDS Injury Assessment  Goal: Absence of physical injury  Outcome: Progressing  Flowsheets (Taken 10/4/2022 1612)  Absence of Physical Injury: Implement safety measures based on patient assessment  Note: Pt absent of  falls this shift. Falling star program within place. RN visual checks on pt hourly with rounds. Call light in reach, bed in lowest position. Fall band intact. Bed alarm set. Pt educated to not get up per self to reduce the risk for falls. Problem: Skin/Tissue Integrity  Goal: Absence of new skin breakdown  Description: 1. Monitor for areas of redness and/or skin breakdown  2. Assess vascular access sites hourly  3. Every 4-6 hours minimum:  Change oxygen saturation probe site  4. Every 4-6 hours:  If on nasal continuous positive airway pressure, respiratory therapy assess nares and determine need for appliance change or resting period. Outcome: Progressing  Note: No new skin issues noted. Up in chair, repositioned frequently.      Problem: Chronic Conditions and Co-morbidities  Goal: Patient's chronic conditions and co-morbidity symptoms are monitored and maintained or improved  Outcome: Progressing  Flowsheets (Taken 10/4/2022 1612)  Care Plan - Patient's Chronic Conditions and Co-Morbidity Symptoms are Monitored and Maintained or Improved: Monitor and assess patient's chronic conditions and comorbid symptoms for stability, deterioration, or improvement  Note: Chronic conditions maintained. Problem: Pain  Goal: Verbalizes/displays adequate comfort level or baseline comfort level  Outcome: Progressing  Flowsheets (Taken 10/4/2022 1612)  Verbalizes/displays adequate comfort level or baseline comfort level: Encourage patient to monitor pain and request assistance  Note: Pt has pain voiced this shift at 3/10. Pt pain goal is 0/10. RN continues to deliver PRN pain medications as ordered. RN tries to complete none invasive and none prescribed methods to help reduce pain like reposition. Pain re-assessed frequently. Bed alarm set after pain meds given. Fall band intact. Care plan reviewed with patient. Patient verbalize understanding of the plan of care and contribute to goal setting.

## 2022-10-04 NOTE — PROGRESS NOTES
[address] [place] [address]  INPATIENT PHYSICAL THERAPY  EVALUATION  STR MED SURG 8B - 8B-37/037-A    Time In: 7243  Time Out: 1004  Timed Code Treatment Minutes: 8 Minutes  Minutes: 19          Date: 10/4/2022  Patient Name: Marciano Cardona,  Gender:  female        MRN: [id number]  : 1926  (80 y.o.)      Referring Practitioner: Dr. Roderick Ospina  Diagnosis: hyponatremia  Additional Pertinent Hx: per H&P: Marciano Cardona is a 80 y.o. female with PMHx of CKD stage IIIa, NIDDMII, HTN, HLD, and recent left facial mole undergoing work-up for possible malignancy who was recently discharged on 10/2/2022 presents to [address] [place] [address] due to son being concerned for weakness and possible low blood pressure. She was recently admitted to [address] [place] [address] on 2022 and discharged on 10/02/2022. Patient was brought in by son for complaints of confusion and decreased mental mentation. Upon admission patient was AO x4. She was found to have a UTI and was started on Rocephin 1 g which was switched over to cefdinir 300 mg. Sodium was also found to be slightly on the lower side at 130. She also had elevated troponin that remained mildly elevated with no chest pain shortness of breath or ST-T wave changes on EKG. She was discharged on 10/02 once patient was stable and on cefdinir 300 mg for for 2 days. Antibiotic course will end tomorrow night. After being discharged yesterday, patient went home and slept for many hours had only half a glass of drink and no meal.  This morning patient had her medications and had a large bowel movement. Hospital nurse called and patient son stated that patient had low BP and sugars were in the high 100s, and patient was in and out of consciousness. Was recommended to call EMS to bring patient in.      Restrictions/Precautions:  Restrictions/Precautions: Fall Risk    Subjective:  Chart Reviewed: Yes  Patient assessed for rehabilitation services?: Yes  Subjective: pleasant and cooperative, pt agreeable for OOB to chair    General:        Hearing: Within functional limits       Pain: no c/o pain    Vitals: Vitals not assessed per clinical judgement, see nursing flowsheet    Social/Functional History:    Lives With: Son  Type of Home: House  Home Equipment: [employer]                   Ambulation Assistance: Needs assistance  Transfer Assistance: Needs assistance          Additional Comments: pt pivots from bed to chair wtih son assist, son provides 24/7 care, has chair cushions and cushions for pt's back also    OBJECTIVE:  Range of Motion:  Bilateral Lower Extremity: WFL    Strength:  Bilateral Lower Extremity: >/=3-/5, generalized weakness    Balance:  Static Sitting Balance:  Contact Guard Assistance  Static Standing Balance: Moderate Assistance, BUE HHA, cues for erect [osture, NBOS    Bed Mobility:  Rolling to Left: Maximum Assistance   Supine to Sit: Maximum Assistance  Scooting: Moderate Assistance  HOB up 30 degrees  Transfers:  Sit to Stand: Maximum Assistance  Stand to Sit:Maximum Assistance  Std pivot transfer, bed to chair with max to modAx1, pt did take 1 step, fwd flexed posture, NBOS, fatigued quickly, BUE HHA  Ambulation:  Not Tested      Exercise:  Patient was guided in 1 set(s) 10 reps of exercise to both lower extremities. Ankle pumps and Long arc quads. Exercises were completed for increased independence with functional mobility. Functional Outcome Measures: Completed  AM-PAC Inpatient Mobility without Stair Climbing Raw Score : 9  AM-PAC Inpatient without Stair Climbing T-Scale Score : 32.44    ASSESSMENT:  Activity Tolerance:  Patient tolerance of  treatment: fair. -      Treatment Initiated: Treatment and education initiated within context of evaluation.   Evaluation time included review of current medical information, gathering information related to past medical, social and functional history, completion of standardized testing, formal and informal observation of tasks, assessment of data and development of plan of care and goals. Treatment time included skilled education and facilitation of tasks to increase safety and independence with functional mobility for improved independence and quality of life. Assessment: Body Structures, Functions, Activity Limitations Requiring Skilled Therapeutic Intervention: Decreased functional mobility , Decreased strength, Decreased endurance, Decreased body mechanics, Decreased tolerance to work activity  Assessment: pt with generalized weakness, dec endurance/balance, inc assist for safe mobility, recommend cont PT to inc pt functional mobility  Therapy Prognosis: Good    Requires PT Follow-Up: Yes    Discharge Recommendations:  Discharge Recommendations: Continue to assess pending progress, 24 hour supervision or assist    Patient Education:      .     Patient Education  Education Given To: Patient  Education Provided: Role of Therapy, Plan of Care  Education Method: Verbal  Barriers to Learning: Cognition, Hearing  Education Outcome: Continued education needed       Equipment Recommendations:  Equipment Needed: No    Plan:  Specific Instructions for Next Treatment: therex, bed mobility, sitting balance, transfers, PT to assess gait  General Plan:  (3-5X GM)  Specific Instructions for Next Treatment: therex, bed mobility, sitting balance, transfers, PT to assess gait    Goals:  Patient Goals : go home  Short Term Goals  Time Frame for Short Term Goals: by discharge  Short Term Goal 1: bed mobility with modA to get in/out of bed  Short Term Goal 2: std pivot transfer with modA to get bed to/from chair safely  Short Term Goal 3: tolerate >10 min sitting balance activity with </=S to demo inc functional activity tolerance  Short Term Goal 4: sit to/from std with modA to get in/out of chairs  Long Term Goals  Time Frame for Long Term Goals : no LTGs set secondary to short ELOS    Following session, patient left in safe position with all fall risk precautions in place.

## 2022-10-04 NOTE — PROGRESS NOTES
Internal Medicine Resident Progress Note    Patient:  Taina Pittman    YOB: 1926  Unit/Bed:8B-37/037-A  MRN: [id number]    Acct: [de-identified]   PCP: Alcira Payne MD    Date of Admission: 10/3/2022      Assessment/Plan:    Dehydration  - Patient's son stated that after discharge patient had very little fluid intake, was mostly sleeping and had a large bowel movement this a.m.  - This morning BP was checked and son states it was low, informed hospital nurse on phone about symptoms and was told to call EMS. - POA Na+ of 126 and K+ of 5.2. Serum osmolality of 262.2.    - Was given 500 mL of bolus IV NS in ED. Plan:  - Started on IV NS at 50 mL/hour    Acute metabolic encephalopathy 2/2 UTI on treatment  - Patient was admitted on 9/29 UA at that time showed large LE and many bacteria. Urine culture was positive for enteric gram-negative bacilli. - She was started on Rocephin 1 g for 3 days. - Switch to p.o. cefdinir 300 mg on 10/02 for 2 more days.  - On this admission patient's WBC remains elevated at 12.6.  - Will finish off antibiotic course of cefdinir 300 mg on 10/4.    - WBC down to 11.1 on 10/04  Plan:  - Antibiotic course of cefdinir finished on 10/04. Hypertension:   - On day of admission BP was checked by son at home and he states it was low, informed hospital nurse on phone about symptoms and was told to call EMS. - POA BP was 117/63 with HR of 92  - Home med of lisinopril 30 mg currently held for possible hypotension.    -Medical on 10/04 BP was rising up to 154/71. Plan:  -Home med of lisinopril was resumed but at a lower dose of 20 mg instead of 30, with hold parameters of SBP<110. Chronic troponinemia 2/2 possible demand ischemia  - On last admission patient's troponin was 0.039-0.052. Troponin was equivocal with no ST-T wave changes on EKG. - On POA patient's troponin was 0.054, patient had no CP or SOB.   - EKG showed NSR with PACs  Plan:  - We will monitor patient for any CP, SOB or anginal pain    CKD:   - On POA GFR > 90, BUN/CR of 20/0.6.  - Patient does take daily Aleve for arthritis, has been taking this medication for the last 20 years and it relieves the pain. Plan:  - Daily BMP monitoring    Noninsulin-dependent DM II  - HbA1c of 7.1 on 06/22. - On last discharge Amaryl 2 mg was discontinued and metformin was recommended to be taken in the morning.  - On POA glucose of 210. Plan:  - Started on low-dose SSI  - Hypoglycemia protocol in place    Hyperlipidemia: Home med of pravastatin 40 mg resumed    Left facial mole s/p biopsy:  - Serology results pending. Lesion is healing  - If malignancy is present surgical excision will be done on 10/26/2022  - Lesion is around 1 cm. Plan:  - Follows up with Dr. Montana Galvan. Right midlung nodular density:  - Small noncalcified nodular density in right midlung around 9 mm diameter  Plan:  - Outpatient follow-up, with possible CT chest      Expected discharge date:  2 days    Disposition:   [x] Home  [] TCU  [] Rehab  [] Psych  [] SNF  [] [place]  [] Other-    ===================================================================      Chief Complaint: Weakness and low blood pressure    Hospital Course:     Isaac Cano is a 80 y.o. female with PMHx of CKD stage IIIa, NIDDMII, HTN, HLD, and recent left facial mole undergoing work-up for possible malignancy who was recently discharged on 10/2/2022 presents to [address] [place] [address] due to son being concerned for weakness and possible low blood pressure. She was recently admitted to [address] [place] [address] on 9/29/2022 and discharged on 10/02/2022. Patient was brought in by son for complaints of confusion and decreased mental mentation. Upon admission patient was AO x4. She was found to have a UTI and was started on Rocephin 1 g which was switched over to cefdinir 300 mg. Sodium was also found to be slightly on the lower side at 130.   She also had elevated troponin that remained mildly elevated with no chest pain shortness of breath or ST-T wave changes on EKG. She was discharged on 10/02 once patient was stable and on cefdinir 300 mg for for 2 days. Antibiotic course will end tomorrow night. After being discharged yesterday, patient went home and slept for many hours had only half a glass of drink and no meal.  This morning patient had her medications and had a large bowel movement. Hospital nurse called and patient son stated that patient had low BP and sugars were in the high 100s, and patient was in and out of consciousness. Was recommended to call EMS to bring patient in. In the ED, patient was afebrile, heart rate of 92, BP of 117/63, 96% SPO2 on room air. Labs are concerning for Na+ of 126, K+ of 5.2, chloride of 90, glucose of 210. Serum osmolality was 262.2. Troponin was 0.054. CBC shows a WBC of 12.6, H&H of 10.7/32.1 and platelets of 463. EKG shows NSR with PACs. She was given 500 mL of bolus IV normal saline. Admitted to inpatient services for further work-up.  and palliative care are on board for further management upon discharge. 10/04:   -Patient was in good morale, corrected sodium has improved to 129. She is on 50 mL of IV NS. Doing well. Had discussion with son, about possible home health care. Patient and son do not want to send patient to a nursing facility. Blood pressure has steadily risen throughout the day up to 154/71, resumed home meds of lisinopril but at 20 mg instead of 30 mg. Antibiotic course has completed for UTI. Subjective (past 24 hours):     Patient is doing well has no acute complaints. No complaints of SOB, CP. Is drinking more fluids. Son at bedside. ROS: reviewed complete ROS unchanged unless otherwise stated in hospital course/subjective portion.        Medications:  Reviewed    Infusion Medications    sodium chloride      sodium chloride 75 mL/hr at 10/04/22 1033 dextrose       Scheduled Medications    lisinopril  20 mg Oral Daily    pravastatin  40 mg Oral QPM    aspirin EC  81 mg Oral Daily    sodium chloride flush  5-40 mL IntraVENous 2 times per day    enoxaparin  30 mg SubCUTAneous Daily    insulin lispro  0-4 Units SubCUTAneous TID     insulin lispro  0-4 Units SubCUTAneous Nightly     PRN Meds: sodium chloride flush, sodium chloride, ondansetron **OR** ondansetron, polyethylene glycol, acetaminophen **OR** acetaminophen, glucose, dextrose bolus **OR** dextrose bolus, glucagon (rDNA), dextrose        Intake/Output Summary (Last 24 hours) at 10/4/2022 1414  Last data filed at 10/4/2022 0925  Gross per 24 hour   Intake 300 ml   Output 250 ml   Net 50 ml       Exam:  BP (!) 154/71   Pulse 87   Temp 97.9 °F (36.6 °C) (Oral)   Resp 16   Ht 4' 11\" (1.499 m)   Wt 82 lb 12.8 oz (37.6 kg)   SpO2 97%   BMI 16.72 kg/m²     General: No distress, appears stated age. Eyes:  PERRL. Conjunctivae/corneas clear. HENT: Head normal appearing. Nares normal. Oral mucosa dry. Hearing hearing mildly impaired. Neck: Supple, with full range of motion. Trachea midline. No gross JVD appreciated. Respiratory:  Normal effort. Clear to auscultation, without rales or wheezes or rhonchi. Cardiovascular: Normal rate, regular rhythm with normal S1/S2 without murmurs. No lower extremity edema. Abdomen: Soft, non-tender, with some guarding present, non-distended with normal bowel sounds. Musculoskeletal: No joint swelling or tenderness. Normal tone. No abnormal movements. Skin: Warm and dry. Very thin skin. Visible veins present on both arms. Neurologic:  No focal sensory/motor deficits in the upper or lower extremities. Cranial nerves:  grossly non-focal 2-12. Psychiatric: Alert and oriented, normal insight and thought content. Capillary Refill: Brisk,< 3 seconds. Peripheral Pulses: +2 palpable, equal bilaterally.        Labs:   Recent Labs     10/02/22  2723 10/03/22  1252 10/04/22  0534   WBC 7.4 12.6* 11.1*   HGB 10.0* 10.7* 9.5*   HCT 30.5* 32.1* 28.0*    309 304     Recent Labs     10/02/22  0512 10/03/22  1252 10/03/22  2321 10/04/22  0534   * 126* 125* 127*   K 4.3 5.2  --  4.9   CL 96* 90*  --  92*   CO2 23 23  --  25   BUN 15 20  --  18   CREATININE 0.6 0.6  --  0.6   CALCIUM 9.1 9.5  --  8.9     Recent Labs     10/02/22  0512 10/03/22  1252   AST 22 39   ALT 9* 17   BILITOT 0.2* 0.3   ALKPHOS 85 102     No results for input(s): INR in the last 72 hours. No results for input(s): Corky Stallion in the last 72 hours. No results for input(s): PROCAL in the last 72 hours. Lab Results   Component Value Date/Time    NITRU NEGATIVE 09/29/2022 12:10 PM    WBCUA > 200 09/29/2022 12:10 PM    WBCUA 0-5 12/31/2021 09:45 PM    BACTERIA MANY 09/29/2022 12:10 PM    RBCUA 3-5 09/29/2022 12:10 PM    BLOODU SMALL 09/29/2022 12:10 PM    GLUCOSEU NEGATIVE 09/29/2022 12:10 PM       Radiology (48 hours):  No results found. DVT prophylaxis:    [x] Lovenox  [] SCDs  [] SQ Heparin  [] Encourage ambulation   [] Already on Anticoagulation       Diet: ADULT DIET; Regular; 5 carb choices (75 gm/meal)  ADULT ORAL NUTRITION SUPPLEMENT; Breakfast, Lunch, Dinner; Diabetic Oral Supplement  ADULT ORAL NUTRITION SUPPLEMENT; Breakfast, Dinner;  Wound Healing Oral Supplement  Code Status: DNR-CC  PT/OT: Consulted  Tele: N/A  IVF: 50 mL of IV NS    Electronically signed by Maximo Nick MD  PGY-1 Internal Medicine Resident  on 10/4/2022 at 2:14 PM    Case was discussed with Attending, Dr. Ann Schulz

## 2022-10-04 NOTE — PROGRESS NOTES
Initial Evaluation          Patient:   Crystal Suazo  YOB: 1926  Age:  80 y.o. Room:  [facility]37/037-A  MRN:  [id number]   Acct: [de-identified]    Date of Admission:  10/3/2022 11:24 AM  Date of Service:  10/4/2022  Completed By:  Andrew Norris RN                 Reason for Palliative Care Evaluation:-             [] Code Status Discussion              [x] Goals of Care              [] Pain/Symptom Management               [] Emotional Support              [] Other:                   Current Issues:-  []  Pain  [x]  Fatigue  []  Nausea  []  Anxiety  []  Depression  []  Shortness of Breath  []  Constipation  [x]  Appetite  []  Other:Alayna was recently discharged to home with her son. She was not eating or drinking while at home. She was brought back to the ED for fatigue, decreased appetite, and UTI. Advance Directives:-  [x] [place] DNR Form  [] Living Will  [] Medical POA             Current Code Status:-  [] Full Resuscitation  [] DNR-Comfort Care-Arrest  [x] DNR-Comfort Care       [] Limited Resuscitation             [] No CPR            [] No shock            [] No ET intubation/reintubation            [] No resuscitative medications            [] Other limitation:              Palliative Performance Status:          [] 60%  Ambulation reduced; Significant disease;Can't do hobbies/housework; intake normal or reduced; occasional assist; LOC full/confusion        [] 50%  Mainly sit/lie; Extensive disease; Can't do any work; Considerable assist; intake normal or reduced; LOC full/confusion        [x] 40%  Mainly in bed; Extensive disease; Mainly assist; intake normal or reduced; LOC full/confusion         [] 30%  Bed Bound; Extensive disease; Total care; intake reduced; LOC full/confusion        [] 20%  Bed Bound; Extensive disease; Total care; intake minimal; Drowsy/coma        [] 10%  Bed Bound; Extensive disease;  Total care; Mouth care only; Drowsy/coma        [] 0  Death        Goals of care evaluation:-        The patient goals of care are to provide comfort care/supportive services/palliation & relieve suffering:  Goals of care discussed with:  [] Patient independently  [x] Patient and Family  [] Family or Healthcare DPOA independently  [] Unable to discuss with patient, family/DPOA not present         Family/Patient Discussion:  Met with Felicita Domínguez and her son Brenden Quiñones in room. Felicita Domínguez was sitting up in the chair. Brenden Quiñones described how he cares for Felicita Domínguez on a daily basis. He has many devices around the home to help him care for Felicita Domínguez, such as grab bars, wheelchair, walker, and other safety devices. He is open to a new consult for home health as she is becoming weaker. He confirms her code status as [facility]. Briefly touched on hospice care. Brenden Quiñones stated he doesn't believe Felicita Domínguez is ready for hospice. He wants to see how home health works out. Encouraged him to keep hospice in mind for the future as Felicita Domínguez is showing signs of deteriorating health. Plan/Follow-Up:  Brenden Quiñones was appreciative of the visit and information. He expects Felicita Domínguez to be discharged tomorrow. No further needs noted at this time.         Electronically signed by Thanh Tom RN on 10/4/2022 at 2:20 PM           Palliative Care Office: [phone]

## 2022-10-04 NOTE — CARE COORDINATION
10/04/22 1515   Readmission Assessment   Number of Days since last admission? 1-7 days   Previous Disposition Home with Family   Who is being Norah Socks   What was the patient's/caregiver's perception as to why they think they needed to return back to the hospital?   (Low blood pressure was noted.)   Did you visit your Primary Care Physician after you left the hospital, before you returned this time? No   Why weren't you able to visit your PCP? Did not have an appointment; Other (Comment)  (Caregiver was to call for appt as pt was discharged over weekend.)   Did you see a specialist, such as Cardiac, Pulmonary, Orthopedic Physician, etc. after you left the hospital? No   Who advised the patient to return to the hospital? Other Nurse (Navigator, CTN)   Does the patient report anything that got in the way of taking their medications? No   In our efforts to provide the best possible care to you and others like you, can you think of anything that we could have done to help you after you left the hospital the first time, so that you might not have needed to return so soon?  Other (Comment)  (Pt son states he had what was needed at discharge.)

## 2022-10-04 NOTE — CARE COORDINATION
DISCHARGE/PLANNING EVALUATION  10/4/22, 4:52 PM EDT    Reason for Referral: discharge needs  Mental Status: Patient is alert and oriented with some confusion  Decision Making: Patient;s son assist with decisions  Family/Social/Home Environment: Spoke with patient and son, assessment completed. Patient lives with son Lesley Man, he is her caregiver. Lesley Man takes care of all patient needs. Current Services including food security, transportation and housekeeping: no current services  Current Equipment:walker, wheelchair, shower chair, grab bars  Payment Source:Medicare and   Concerns or Barriers to Discharge: Patient and son are agreeable to [place], nursing, aide, PT & OT. Post-acute provider list with abbreviated version of the quality measures, geographic area, and applicable managed care information provided. Offered option of searching Medicare. gov website by using the computer in patient's room to review complete quality measures information. Questions regarding selection process answered:HH list provided and explained. SW will follow up for agency preference. Teach Back Method used with patient regarding care plan and discharge planning. Patient and son Lesley Man verbalize understanding of the plan of care and contribute to goal setting. Patient goals, treatment preferences and discharge plan: Plan home with son and new HH.     Electronically signed by RADHA Pierre on 10/4/2022 at 4:52 PM

## 2022-10-04 NOTE — CARE COORDINATION
10/4/22, 12:11 PM EDT      [facility]  Admitted: 10/3/2022  Hospital Day: 1    Location: [address] [place] [address]-A Reason for admit: Hyponatremia [E87.1]  Near syncope [R55]    Past Medical History:   Diagnosis Date    Broken leg 1982    Lt    Controlled type 2 diabetes mellitus with microalbuminuria, without long-term current use of insulin (HCC)     Hyperlipidemia     Persistent proteinuria associated with type 2 diabetes mellitus ([facility] [address]     Varicose veins        Procedure: No.  Barriers to Discharge: Admitted from ER with weakness and poor intake. BP reportedly low. Na+noted to be low at 126. Palliative Care consulted. PT/OT. IVF at 75/hr. PCP: Sofia Gutierres MD    Readmission Risk              Risk of Unplanned Readmission:  10         Patient's Healthcare Decision Maker: Legal Next of Kin    Patient Goals/Plan/Treatment Preferences: Met with pt and 2 sons today. Son Kylie Burrows is pt full time caregiver. He used no HH services up to this time but is now receptive. SW has been consulted. Pt has DME including walker, wheelchair, grab bars and shower chair. She has a PCP, son provides transportation and she is insured. No issues obtaining medications.      Transportation/Food Security/Housekeeping Addressed: no issues identified

## 2022-10-04 NOTE — PLAN OF CARE
Problem: Discharge Planning  Goal: Discharge to home or other facility with appropriate resources  Outcome: Progressing     Problem: Safety - Adult  Goal: Free from fall injury  Outcome: Progressing  Note: Pivot to bedside commode     Problem: ABCDS Injury Assessment  Goal: Absence of physical injury  Outcome: Progressing     Problem: Skin/Tissue Integrity  Goal: Absence of new skin breakdown  Description: 1. Monitor for areas of redness and/or skin breakdown  2. Assess vascular access sites hourly  3. Every 4-6 hours minimum:  Change oxygen saturation probe site  4. Every 4-6 hours:  If on nasal continuous positive airway pressure, respiratory therapy assess nares and determine need for appliance change or resting period.   Outcome: Progressing

## 2022-10-05 PROBLEM — E43 SEVERE MALNUTRITION (HCC): Status: ACTIVE | Noted: 2022-10-05

## 2022-10-05 LAB
ANION GAP SERPL CALCULATED.3IONS-SCNC: 11 MEQ/L (ref 8–16)
BUN BLDV-MCNC: 20 MG/DL (ref 7–22)
CALCIUM SERPL-MCNC: 9 MG/DL (ref 8.5–10.5)
CHLORIDE BLD-SCNC: 96 MEQ/L (ref 98–111)
CO2: 25 MEQ/L (ref 23–33)
CREAT SERPL-MCNC: 0.6 MG/DL (ref 0.4–1.2)
ERYTHROCYTE [DISTWIDTH] IN BLOOD BY AUTOMATED COUNT: 12.5 % (ref 11.5–14.5)
ERYTHROCYTE [DISTWIDTH] IN BLOOD BY AUTOMATED COUNT: 41.9 FL (ref 35–45)
FERRITIN: 144 NG/ML (ref 10–291)
GFR SERPL CREATININE-BSD FRML MDRD: > 90 ML/MIN/1.73M2
GLUCOSE BLD-MCNC: 130 MG/DL (ref 70–108)
GLUCOSE BLD-MCNC: 136 MG/DL (ref 70–108)
GLUCOSE BLD-MCNC: 266 MG/DL (ref 70–108)
GLUCOSE BLD-MCNC: 306 MG/DL (ref 70–108)
GLUCOSE BLD-MCNC: 324 MG/DL (ref 70–108)
GLUCOSE BLD-MCNC: 325 MG/DL (ref 70–108)
GLUCOSE BLD-MCNC: 347 MG/DL (ref 70–108)
HCT VFR BLD CALC: 25.4 % (ref 37–47)
HEMOGLOBIN: 8.8 GM/DL (ref 12–16)
IRON: 25 UG/DL (ref 50–170)
MCH RBC QN AUTO: 32.1 PG (ref 26–33)
MCHC RBC AUTO-ENTMCNC: 34.6 GM/DL (ref 32.2–35.5)
MCV RBC AUTO: 92.7 FL (ref 81–99)
OSMOLALITY URINE: 331 MOSMOL/KG (ref 250–750)
OSMOLALITY: 275 MOSMOL/KG (ref 275–295)
PLATELET # BLD: 300 THOU/MM3 (ref 130–400)
PMV BLD AUTO: 9.7 FL (ref 9.4–12.4)
POTASSIUM SERPL-SCNC: 4.6 MEQ/L (ref 3.5–5.2)
RBC # BLD: 2.74 MILL/MM3 (ref 4.2–5.4)
SODIUM BLD-SCNC: 123 MEQ/L (ref 135–145)
SODIUM BLD-SCNC: 124 MEQ/L (ref 135–145)
SODIUM BLD-SCNC: 132 MEQ/L (ref 135–145)
SODIUM URINE: 49 MEQ/L
TRANSFERRIN: 157 MG/DL (ref 200–360)
WBC # BLD: 9.1 THOU/MM3 (ref 4.8–10.8)

## 2022-10-05 PROCEDURE — 83540 ASSAY OF IRON: CPT

## 2022-10-05 PROCEDURE — 85027 COMPLETE CBC AUTOMATED: CPT

## 2022-10-05 PROCEDURE — 83930 ASSAY OF BLOOD OSMOLALITY: CPT

## 2022-10-05 PROCEDURE — 80048 BASIC METABOLIC PNL TOTAL CA: CPT

## 2022-10-05 PROCEDURE — 99233 SBSQ HOSP IP/OBS HIGH 50: CPT | Performed by: INTERNAL MEDICINE

## 2022-10-05 PROCEDURE — 6370000000 HC RX 637 (ALT 250 FOR IP)

## 2022-10-05 PROCEDURE — 84295 ASSAY OF SERUM SODIUM: CPT

## 2022-10-05 PROCEDURE — 82948 REAGENT STRIP/BLOOD GLUCOSE: CPT

## 2022-10-05 PROCEDURE — 83935 ASSAY OF URINE OSMOLALITY: CPT

## 2022-10-05 PROCEDURE — 97166 OT EVAL MOD COMPLEX 45 MIN: CPT

## 2022-10-05 PROCEDURE — 36415 COLL VENOUS BLD VENIPUNCTURE: CPT

## 2022-10-05 PROCEDURE — 84466 ASSAY OF TRANSFERRIN: CPT

## 2022-10-05 PROCEDURE — 82728 ASSAY OF FERRITIN: CPT

## 2022-10-05 PROCEDURE — 2580000003 HC RX 258

## 2022-10-05 PROCEDURE — 6360000002 HC RX W HCPCS

## 2022-10-05 PROCEDURE — 84300 ASSAY OF URINE SODIUM: CPT

## 2022-10-05 PROCEDURE — 97530 THERAPEUTIC ACTIVITIES: CPT

## 2022-10-05 PROCEDURE — 1200000000 HC SEMI PRIVATE

## 2022-10-05 PROCEDURE — 6370000000 HC RX 637 (ALT 250 FOR IP): Performed by: STUDENT IN AN ORGANIZED HEALTH CARE EDUCATION/TRAINING PROGRAM

## 2022-10-05 RX ORDER — LISINOPRIL 10 MG/1
10 TABLET ORAL DAILY
Status: DISCONTINUED | OUTPATIENT
Start: 2022-10-06 | End: 2022-10-07 | Stop reason: HOSPADM

## 2022-10-05 RX ORDER — INSULIN GLARGINE 100 [IU]/ML
7 INJECTION, SOLUTION SUBCUTANEOUS NIGHTLY
Status: DISCONTINUED | OUTPATIENT
Start: 2022-10-05 | End: 2022-10-07 | Stop reason: HOSPADM

## 2022-10-05 RX ORDER — INSULIN LISPRO 100 [IU]/ML
0-8 INJECTION, SOLUTION INTRAVENOUS; SUBCUTANEOUS
Status: DISCONTINUED | OUTPATIENT
Start: 2022-10-05 | End: 2022-10-07 | Stop reason: HOSPADM

## 2022-10-05 RX ORDER — FERROUS SULFATE 325(65) MG
325 TABLET ORAL
Status: DISCONTINUED | OUTPATIENT
Start: 2022-10-05 | End: 2022-10-07 | Stop reason: HOSPADM

## 2022-10-05 RX ORDER — INSULIN LISPRO 100 [IU]/ML
0-4 INJECTION, SOLUTION INTRAVENOUS; SUBCUTANEOUS NIGHTLY
Status: DISCONTINUED | OUTPATIENT
Start: 2022-10-05 | End: 2022-10-07 | Stop reason: HOSPADM

## 2022-10-05 RX ADMIN — INSULIN LISPRO 3 UNITS: 100 INJECTION, SOLUTION INTRAVENOUS; SUBCUTANEOUS at 13:22

## 2022-10-05 RX ADMIN — SODIUM CHLORIDE, PRESERVATIVE FREE 5 ML: 5 INJECTION INTRAVENOUS at 21:07

## 2022-10-05 RX ADMIN — PRAVASTATIN SODIUM 40 MG: 40 TABLET ORAL at 17:30

## 2022-10-05 RX ADMIN — INSULIN LISPRO 6 UNITS: 100 INJECTION, SOLUTION INTRAVENOUS; SUBCUTANEOUS at 17:23

## 2022-10-05 RX ADMIN — INSULIN LISPRO 4 UNITS: 100 INJECTION, SOLUTION INTRAVENOUS; SUBCUTANEOUS at 22:28

## 2022-10-05 RX ADMIN — ASPIRIN 81 MG: 81 TABLET, COATED ORAL at 08:50

## 2022-10-05 RX ADMIN — INSULIN GLARGINE 7 UNITS: 100 INJECTION, SOLUTION SUBCUTANEOUS at 21:02

## 2022-10-05 RX ADMIN — INSULIN LISPRO 4 UNITS: 100 INJECTION, SOLUTION INTRAVENOUS; SUBCUTANEOUS at 21:03

## 2022-10-05 RX ADMIN — FERROUS SULFATE TAB 325 MG (65 MG ELEMENTAL FE) 325 MG: 325 (65 FE) TAB at 21:59

## 2022-10-05 RX ADMIN — LISINOPRIL 20 MG: 20 TABLET ORAL at 08:50

## 2022-10-05 RX ADMIN — ENOXAPARIN SODIUM 30 MG: 100 INJECTION SUBCUTANEOUS at 08:51

## 2022-10-05 RX ADMIN — SODIUM CHLORIDE, PRESERVATIVE FREE 10 ML: 5 INJECTION INTRAVENOUS at 08:51

## 2022-10-05 ASSESSMENT — PAIN DESCRIPTION - LOCATION: LOCATION: LEG

## 2022-10-05 ASSESSMENT — PAIN DESCRIPTION - ORIENTATION: ORIENTATION: RIGHT;LEFT

## 2022-10-05 ASSESSMENT — PAIN SCALES - GENERAL: PAINLEVEL_OUTOF10: 3

## 2022-10-05 NOTE — PROGRESS NOTES
Jackelin Lawson 60  INPATIENT OCCUPATIONAL THERAPY  [facility] MED SURG 8B  EVALUATION    Time:   Time In:   Time Out: 1411  Timed Code Treatment Minutes: 8 Minutes  Minutes: 16          Date: 10/5/2022  Patient Name: Isaac Cano,   Gender: female      MRN: [id number]  : 1926  (80 y.o.)  Referring Practitioner: Ishmael Basurto MD  Diagnosis: Hyponatremia  Additional Pertinent Hx: Per H&P: Isaac Cano is a 80 y.o. female with PMHx of CKD stage IIIa, NIDDMII, HTN, HLD, and recent left facial mole undergoing work-up for possible malignancy who was recently discharged on 10/2/2022 presents to [facility] due to son being concerned for weakness and possible low blood pressure. She was recently admitted to [address] [place] [address] on 2022 and discharged on 10/02/2022. Patient was brought in by son for complaints of confusion and decreased mental mentation. Upon admission patient was AO x4. She was found to have a UTI and was started on Rocephin 1 g which was switched over to cefdinir 300 mg. Sodium was also found to be slightly on the lower side at 130. She also had elevated troponin that remained mildly elevated with no chest pain shortness of breath or ST-T wave changes on EKG. She was discharged on 10/02 once patient was stable and on cefdinir 300 mg for for 2 days. Antibiotic course will end tomorrow night. After being discharged yesterday, patient went home and slept for many hours had only half a glass of drink and no meal.  This morning patient had her medications and had a large bowel movement. Hospital nurse called and patient son stated that patient had low BP and sugars were in the high 100s, and patient was in and out of consciousness. Was recommended to call EMS to bring patient in.     Restrictions/Precautions:  Restrictions/Precautions: General Precautions, Fall Risk    Subjective  Chart Reviewed: Yes, Orders, History and Physical, Imaging, Progress Notes  Patient assessed for rehabilitation services?: Yes    Subjective: RN okayed OT session. Upon arrival patient was sitting up in recliner. Pt was agreeable to OT session. Pain: 0/10: Pt did not c/o pain. Vitals: Vitals not assessed per clinical judgement, see nursing flowsheet    Social/Functional History:  Lives With: Son  Type of Home: House  Home Layout: One level  Home Access: Level entry  Home Equipment: Allie Cedrick Shower/Tub: Tub/Shower unit  Bathroom Toilet: Handicap height  Bathroom Accessibility: Accessible    Receives Help From: Family  ADL Assistance: Needs assistance  Homemaking Assistance: Needs assistance  Homemaking Responsibilities: No  Ambulation Assistance: Needs assistance  Transfer Assistance: Needs assistance    Active : No     Additional Comments: pt pivots from bed to chair wtih son assist, son provides 24/7 care, has chair cushions and cushions for pt's back also    VISION:WFL    HEARING:  WFL    COGNITION: Slow Processing, Decreased Problem Solving, and Decreased Safety Awareness    RANGE OF MOTION:  Bilateral Upper Extremity:  WFL-slow movements     STRENGTH:  Bilateral Upper Extremity:  Impaired - very deconditioned     ADL:   Lower Extremity Dressing: Dependent. [facility] BALANCE:  Sitting Balance:  Stand By Assistance. Standing Balance: Maximum Assistance. With BUE supported     BED MOBILITY:  Sit to Supine: Maximum Assistance    Scooting: Maximum Assistance      TRANSFERS:  Sit to Stand:  Maximum Assistance, X 1, with increased time for completion, cues for hand placement. Stand to Sit: Maximum Assistance, X 1.    Stand Pivot: Maximum Assistance. FUNCTIONAL MOBILITY:  OTR to assess when appropriate       Activity Tolerance:  Patient tolerance of  treatment: good. Assessment: This 80year old female presents with hyponatermia. Pt demonstrates weakness, decreased balance, decrease safety awareness, decreased endurance.  Pt requires skilled OT intervention to increase indep and safety with all self cares, transfers, mobility, and IADLs to return to OF. Without skilled OT intervention patient is at increased risk for falls, caregiver burden, and hospital readmission after discharge. Pt would benefit from OT at discharge. Prognosis: Good  REQUIRES OT FOLLOW-UP: Yes    Treatment Initiated: Treatment and education initiated within context of evaluation. Evaluation time included review of current medical information, gathering information related to past medical, social and functional history, completion of standardized testing, formal and informal observation of tasks, assessment of data and development of plan of care and goals. Treatment time included skilled education and facilitation of tasks to increase safety and independence with ADL's for improved functional independence and quality of life. Discharge Recommendations:  Continue to assess pending progress, 24 hour supervision or assist, Patient would benefit from continued therapy after discharge    Patient Education:     Patient Education  Education Given To: Patient  Education Provided: Role of Therapy, Plan of Care, Precautions, ADL Adaptive Strategies, Transfer Training  Education Method: Demonstration  Barriers to Learning: None  Education Outcome: Continued education needed    Equipment Recommendations:  Equipment Needed: No    Plan:  Times Per Week: 3-5x  Current Treatment Recommendations: Strengthening, Balance training, Equipment evaluation, education, & procurement, Safety education & training, Patient/Caregiver education & training, Self-Care / ADL. See long-term goal time frame for expected duration of plan of care. If no long-term goals established, a short length of stay is anticipated.     Goals:  Patient goals : Go Home  Short Term Goals  Time Frame for Short Term Goals: Until discharge  Short Term Goal 1: Pt will complete BUE strengthening exercises with min vcs for technique to increase indep and endurance with all self cares. Short Term Goal 2: Pt will complete dynamic sitting task x 15 minutes with 2 UE release while reaching outside of MONTY to increase indep with self cares. Short Term Goal 3: Pt will complete stand pivots to/from various surfaces with mod A and min vcs for safety to increase indep and endurance with toileting. Short Term Goal 4: Pt will complete simple grooming tasks with set up . Additional Goals?: No         Following session, patient left in safe position with all fall risk precautions in place.

## 2022-10-05 NOTE — CARE COORDINATION
10/5/22, 3:05 PM EDT    DISCHARGE ON GOING [place] day: 2  Location: [address] [place] [address] Reason for admit: Hyponatremia [E87.1]  Near syncope [R55]   Procedure: No.  Barriers to Discharge: Na+ 132 today. PT/OT following. Palliative has met with pt and son. Possible discharge tomorrow pending stability of Na+. PCP: Vinod Bhakta MD  Readmission Risk Score: 15.6%  Patient Goals/Plan/Treatment Preferences: Plans return home with son and new HH. SW following.

## 2022-10-05 NOTE — CARE COORDINATION
10/5/22, 2:55 PM EDT    DISCHARGE PLANNING EVALUATION    Spoke with patient and sons regarding [facility] agency. First preference is [facility], second is [facility].      3:10 PM  Referral made to [facility]. Faxed facesheet. They will run insurance and get back if able to accept.

## 2022-10-05 NOTE — PROGRESS NOTES
Internal Medicine Resident Progress Note    Patient:  Enmanuel Vanegas    YOB: 1926  Unit/Bed:8B-37/037-A  MRN: [id number]    Acct: [de-identified]   PCP: Ariel Hoffmann MD    Date of Admission: 10/3/2022      Assessment/Plan:    Dehydration  - Patient's son stated that after discharge patient had very little fluid intake, was mostly sleeping and had a large bowel movement this a.m.  - This morning BP was checked and son states it was low, informed hospital nurse on phone about symptoms and was told to call EMS. - POA Na+ of 126 and K+ of 5.2. Serum osmolality of 262.2.    - Was given 500 mL of bolus IV NS in ED.    - Fluids were discontinued to assess pts stability before discharge. Plan:  - Encourage P. O fluid intake. Acute hyponatremia 2/2 dehydration  -On POA Na+ of 126, was given 500 mL of bolus  NS ED.  -Electrolytes show: Urine sodium of 49 and urine osmolarity of 331. - Was given IV fluids for 2 days. Discontinued on 10/5  - Based on urine electrolytes, will collect am cortisol.   - TSH wnl on this admission. Plan:  - Give 1L bolus IV NS infusion over 3 hrs. Due to elderly pt and low BMI. Hypertension:   - On day of admission BP was checked by son at home and he states it was low, informed hospital nurse on phone about symptoms and was told to call EMS. - POA BP was 117/63 with HR of 92  - Home med of lisinopril 30 mg currently held for possible hypotension.    -Medical on 10/04 BP was rising up to 154/71. Plan:  -Home med of lisinopril was resumed but at a lower dose of 20 mg instead of 30, with hold parameters of SBP<110.      Acute normocytic anemia:  -POA H&H of 10.7/32.1.  -On 10/5 H&H of 8.8/25.4  - Iron studies show iron of 25 and ferritin of 144  Plan:  -We will start patient on iron tablets of 325 mg daily with breakfast.       Acute metabolic encephalopathy 2/2 UTI on treatment (Resolved)  - Patient was admitted on 9/29 UA at that time showed large LE and many bacteria. Urine culture was positive for enteric gram-negative bacilli. - She was started on Rocephin 1 g for 3 days. - Switch to p.o. Cefdinir 300 mg on 10/02 for 2 more days. Finished on 10/04.  - On this admission patient's WBC remains elevated at 12.6.  - WBC trended down to 9.1 on 10/05. Chronic troponinemia 2/2 possible demand ischemia  - On last admission patient's troponin was 0.039-0.052. Troponin was equivocal with no ST-T wave changes on EKG. - On POA patient's troponin was 0.054, patient had no CP or SOB. - EKG showed NSR with PACs  Plan:  - We will monitor patient for any CP, SOB or anginal pain    CKD:   - On POA GFR > 90, BUN/CR of 20/0.6.  - Patient does take daily Aleve for arthritis, has been taking this medication for the last 20 years and it relieves the pain. Plan:  - Daily BMP monitoring    Noninsulin-dependent DM II  - HbA1c of 7.1 on 06/22. - On last discharge Amaryl 2 mg was discontinued and metformin was recommended to be taken in the morning.  - On POA glucose of 210. Plan:  - Switched to medium-dose SSI and 7 units of Lantus.   - Hypoglycemia protocol in place    Hyperlipidemia: Home med of pravastatin 40 mg resumed    Left facial mole s/p biopsy:  - Serology results pending. Lesion is healing  - If malignancy is present surgical excision will be done on 10/26/2022  - Lesion is around 1 cm. Plan:  - Follows up with Dr. Shar Hughes.      Right midlung nodular density:  - Small noncalcified nodular density in right midlung around 9 mm diameter  Plan:  - Outpatient follow-up, with possible CT chest      Expected discharge date:  2 days    Disposition:   [x] Home  [] TCU  [] Rehab  [] Psych  [] SNF  [] [facility]  [] Other-    ===================================================================      Chief Complaint: Weakness and low blood pressure    Hospital Course:     Thomas Singleton is a 80 y.o. female with PMHx of CKD stage IIIa, NIDDMII, HTN, HLD, and recent left facial mole undergoing work-up for possible malignancy who was recently discharged on 10/2/2022 presents to [address] [address] due to son being concerned for weakness and possible low blood pressure. She was recently admitted to [address] [place] [address] on 9/29/2022 and discharged on 10/02/2022. Patient was brought in by son for complaints of confusion and decreased mental mentation. Upon admission patient was AO x4. She was found to have a UTI and was started on Rocephin 1 g which was switched over to cefdinir 300 mg. Sodium was also found to be slightly on the lower side at 130. She also had elevated troponin that remained mildly elevated with no chest pain shortness of breath or ST-T wave changes on EKG. She was discharged on 10/02 once patient was stable and on cefdinir 300 mg for for 2 days. Antibiotic course will end tomorrow night. After being discharged yesterday, patient went home and slept for many hours had only half a glass of drink and no meal.  This morning patient had her medications and had a large bowel movement. Hospital nurse called and patient son stated that patient had low BP and sugars were in the high 100s, and patient was in and out of consciousness. Was recommended to call EMS to bring patient in. In the ED, patient was afebrile, heart rate of 92, BP of 117/63, 96% SPO2 on room air. Labs are concerning for Na+ of 126, K+ of 5.2, chloride of 90, glucose of 210. Serum osmolality was 262.2. Troponin was 0.054. CBC shows a WBC of 12.6, H&H of 10.7/32.1 and platelets of 959. EKG shows NSR with PACs. She was given 500 mL of bolus IV normal saline. Admitted to inpatient services for further work-up.  and palliative care are on board for further management upon discharge. 10/04:   -Patient was in good morale, corrected sodium has improved to 129. She is on 50 mL of IV NS. Doing well. Had discussion with son, about possible home health care.   Patient and son do not want to send patient to a nursing facility. Blood pressure has steadily risen throughout the day up to 154/71, resumed home meds of lisinopril but at 20 mg instead of 30 mg. Antibiotic course has completed for UTI. 10/05:  -Patient remains in good work. In the a.m. sodium increased to 132, at at 3 PM sodium dropped down to 123. We will consider giving fluids and checking a.m. cortisol levels to see work-up for possible adrenal issues. Subjective (past 24 hours):     Patient is doing well has no acute complaints. No complaints of SOB and CP. Patient patient's son states she is doing well. More energetic this a.m.       ROS: reviewed complete ROS unchanged unless otherwise stated in hospital course/subjective portion. Medications:  Reviewed    Infusion Medications    sodium chloride      dextrose       Scheduled Medications    lisinopril  20 mg Oral Daily    insulin glargine  5 Units SubCUTAneous Nightly    pravastatin  40 mg Oral QPM    aspirin EC  81 mg Oral Daily    sodium chloride flush  5-40 mL IntraVENous 2 times per day    enoxaparin  30 mg SubCUTAneous Daily    insulin lispro  0-4 Units SubCUTAneous TID WC    insulin lispro  0-4 Units SubCUTAneous Nightly     PRN Meds: sodium chloride flush, sodium chloride, ondansetron **OR** ondansetron, polyethylene glycol, acetaminophen **OR** acetaminophen, glucose, dextrose bolus **OR** dextrose bolus, glucagon (rDNA), dextrose        Intake/Output Summary (Last 24 hours) at 10/5/2022 1700  Last data filed at 10/5/2022 0334  Gross per 24 hour   Intake 800 ml   Output 275 ml   Net 525 ml         Exam:  BP (!) 115/55   Pulse 94   Temp 98.3 °F (36.8 °C) (Axillary)   Resp 17   Ht 4' 11\" (1.499 m)   Wt 85 lb 8 oz (38.8 kg)   SpO2 97%   BMI 17.27 kg/m²     General: No distress, appears stated age. Eyes:  PERRL. Conjunctivae/corneas clear. HENT: Head normal appearing. Nares normal. Oral mucosa dry. Hearing hearing mildly impaired. Neck: Supple, with full range of motion. Respiratory:  Normal effort. Clear to auscultation, no rales. Cardiovascular: RRR w. S1/S2 present. No lower extremity edema. Abdomen: Soft, non-tender, with some guarding present, non-distended with normal bowel sounds. Musculoskeletal: Normal tone. No abnormal movements. Skin: Warm and dry. Very thin skin. Visible veins present on both arms. Neurologic:  No focal sensory/motor deficits in the upper or lower extremities. Cranial nerves:  grossly non-focal 2-12. Psychiatric: Alert and oriented, normal insight and thought content. Capillary Refill: Brisk,< 3 seconds. Peripheral Pulses: +2 palpable, equal bilaterally. Labs:   Recent Labs     10/03/22  1252 10/04/22  0534 10/05/22  0543   WBC 12.6* 11.1* 9.1   HGB 10.7* 9.5* 8.8*   HCT 32.1* 28.0* 25.4*    304 300       Recent Labs     10/03/22  1252 10/03/22  2321 10/04/22  0534 10/04/22  1500 10/04/22  2313 10/05/22  0543   *   < > 127* 123* 128* 132*   K 5.2  --  4.9  --   --  4.6   CL 90*  --  92*  --   --  96*   CO2 23  --  25  --   --  25   BUN 20  --  18  --   --  20   CREATININE 0.6  --  0.6  --   --  0.6   CALCIUM 9.5  --  8.9  --   --  9.0    < > = values in this interval not displayed. Recent Labs     10/03/22  1252   AST 39   ALT 17   BILITOT 0.3   ALKPHOS 102       No results for input(s): INR in the last 72 hours. No results for input(s): Hamp Memory in the last 72 hours. No results for input(s): PROCAL in the last 72 hours. Lab Results   Component Value Date/Time    NITRU NEGATIVE 09/29/2022 12:10 PM    WBCUA > 200 09/29/2022 12:10 PM    WBCUA 0-5 12/31/2021 09:45 PM    BACTERIA MANY 09/29/2022 12:10 PM    RBCUA 3-5 09/29/2022 12:10 PM    BLOODU SMALL 09/29/2022 12:10 PM    GLUCOSEU NEGATIVE 09/29/2022 12:10 PM       Radiology (48 hours):  No results found.      DVT prophylaxis:    [x] Lovenox  [] SCDs  [] SQ Heparin  [] Encourage ambulation   [] Already on Anticoagulation       Diet: ADULT DIET; Regular; 5 carb choices (75 gm/meal)  ADULT ORAL NUTRITION SUPPLEMENT; Breakfast, Lunch, Dinner; Diabetic Oral Supplement  ADULT ORAL NUTRITION SUPPLEMENT; Breakfast, Dinner; Wound Healing Oral Supplement  Code Status: DNR-CC  PT/OT: Consulted  Tele: N/A  IVF: Bolus fluid possible.     Electronically signed by Juanita Hawkins MD  PGY-1 Internal Medicine Resident  on 10/5/2022 at 5:00 PM    Case was discussed with Attending, Dr. Lonny Barnard

## 2022-10-05 NOTE — PROGRESS NOTES
[address] [place] [address]  INPATIENT PHYSICAL THERAPY  DAILY NOTE  [facility] MED SURG 8B - 8B-37/037-A    Time In: 4788  Time Out: 0086  Timed Code Treatment Minutes: 10 Minutes  Minutes: 10          Date: 10/5/2022  Patient Name: Ayesha Talbert,  Gender:  female        MRN: [id number]  : 1926  (80 y.o.)     Referring Practitioner: Dr. Charles Alcantara  Diagnosis: hyponatremia  Additional Pertinent Hx: per H&P: Ayesha Talbert is a 80 y.o. female with PMHx of CKD stage IIIa, NIDDMII, HTN, HLD, and recent left facial mole undergoing work-up for possible malignancy who was recently discharged on 10/2/2022 presents to [address] [place] [address] due to son being concerned for weakness and possible low blood pressure. She was recently admitted to [address] [place] [address] on 2022 and discharged on 10/02/2022. Patient was brought in by son for complaints of confusion and decreased mental mentation. Upon admission patient was AO x4. She was found to have a UTI and was started on Rocephin 1 g which was switched over to cefdinir 300 mg. Sodium was also found to be slightly on the lower side at 130. She also had elevated troponin that remained mildly elevated with no chest pain shortness of breath or ST-T wave changes on EKG. She was discharged on 10/02 once patient was stable and on cefdinir 300 mg for for 2 days. Antibiotic course will end tomorrow night. After being discharged yesterday, patient went home and slept for many hours had only half a glass of drink and no meal.  This morning patient had her medications and had a large bowel movement. Hospital nurse called and patient son stated that patient had low BP and sugars were in the high 100s, and patient was in and out of consciousness. Was recommended to call EMS to bring patient in.      Prior Level of Function:  Lives With: Son  Type of Home: House  Home Equipment: [employer] Raspberry        Ambulation Assistance: Needs assistance  Transfer Assistance: Needs assistance  Additional Comments: pt pivots from bed to chair wtih son assist, son provides 24/7 care, has chair cushions and cushions for pt's back also    Restrictions/Precautions:  Restrictions/Precautions: Fall Risk     SUBJECTIVE: RN approved session. Patient laying in bed upon arrival and agreeable to therapy. Patient incontinent of urine and required pericare prior to transfer to bedside chair. PAIN: denies    Vitals: Nurse checked vitals prior to session    OBJECTIVE:  Bed Mobility:  Rolling to Left: Moderate Assistance, with head of bed flat, with rail, with verbal cues , with increased time for completion   Rolling to Right: Moderate Assistance, with head of bed flat, with rail, with verbal cues , with increased time for completion   Supine to Sit: Maximum Assistance, with head of bed raised, with rail, with verbal cues , with increased time for completion  Scooting: Moderate Assistance, Maximum Assistance, X 1, to edge of bed    Transfers:  Stand Pivot:Maximum Assistance, X 1, and Mod A x1 for safety, bed>chair, towards left side, no device    Exercise:  None this session, session shortened due to breakfast arrival.     Functional Outcome Measures: Completed  AM-PAC Inpatient Mobility without Stair Climbing Raw Score : 9  AM-PAC Inpatient without Stair Climbing T-Scale Score : 32.44    ASSESSMENT:  Assessment: Patient progressing toward established goals. Activity Tolerance:  Patient tolerance of  treatment: fair.       Equipment Recommendations:Equipment Needed: No  Discharge Recommendations: Subacte/Skilled Nursing Facility and 24 hour assistance or supervision  Plan: Specific Instructions for Next Treatment: therex, bed mobility, sitting balance, transfers, PT to assess gait  General Plan:  (3-5X GM)  Specific Instructions for Next Treatment: therex, bed mobility, sitting balance, transfers, PT to assess gait    Patient Education  Patient Education: Plan of Care, Bed Mobility, Transfers, Up in Chair for All Meals    Goals:  Patient Goals : go home  Short Term Goals  Time Frame for Short Term Goals: by discharge  Short Term Goal 1: bed mobility with modA to get in/out of bed  Short Term Goal 2: std pivot transfer with modA to get bed to/from chair safely  Short Term Goal 3: tolerate >10 min sitting balance activity with </=S to [employer] functional activity tolerance  Short Term Goal 4: sit to/from std with modA to get in/out of chairs  Long Term Goals  Time Frame for Long Term Goals : no LTGs set secondary to short ELOS    Following session, patient left in safe position with all fall risk precautions in place.

## 2022-10-05 NOTE — PROGRESS NOTES
Comprehensive Nutrition Assessment    Type and Reason for Visit:  Initial, Positive Nutrition Screen    Nutrition Recommendations/Plan:   Consider daily MVI  Continue current diet  ONS Glucerna TID and Sal BID     Malnutrition Assessment:  Malnutrition Status:  Severe malnutrition (10/05/22 1527)    Context:  Acute Illness     Findings of the 6 clinical characteristics of malnutrition:  Energy Intake:  Mild decrease in energy intake (Comment) (Poor Po intake after DC on 10/2; Prior to past admit 9/29-10/2 pt was eating 3 meals a day at baseline)  Weight Loss:   (9.6 % in the past week; 9/30/22 94 lbs 5 oz, 10/5/22 85 lbs 8 oz)     Body Fat Loss:   (moderate/severe; may be impart d/t advanced age) Orbital, Buccal region   Muscle Mass Loss:   (moderate/severe; may be impart d/t advanced age) Clavicles (pectoralis & deltoids), Temples (temporalis)  Fluid Accumulation:  No significant fluid accumulation     Strength:  Not Performed    Nutrition Assessment:     Pt. severely malnourished, in the context of acute AEB criteria listed above. At risk for further nutritional compromise r/t recent admit 9/29-10/2, Dehydration (poor po after DC on 91/9), Acute metabolic encephalopathy 2/2 UTI (finished abx on 10/4), T2DM, HTN and underlying medical condition (Hx; DM, HLD). Nutrition Related Findings:    Pt. Report/Treatments/Miscellaneous: Pt recently admit 9/29-10/2; after discharge pt went home slept for many hours and had poor po fluids/meal; Pt was re-admitted with low BP and blood sugars in the high 100s; Pt has also lost a significant amount of weight this past week per EMR about ~10lbs or 9.6%; Prior to previous admit pt was eating 3 meals a day per son; Resumed ONS Sal and Glucerna 10/4; Po intake has been improving; Will continue to monitor while inpatient; Plans home with [facility].      GI Status:  10/4  Pertinent Labs: A1C 7.1 (6/15/22), Glucose 130  Pertinent Meds: Lantus, Humalog     Wound Type: Stage II (coccyx)       Current Nutrition Intake & Therapies:    Average Meal Intake: Unable to assess  Average Supplements Intake: %  ADULT DIET; Regular; 5 carb choices (75 gm/meal)  ADULT ORAL NUTRITION SUPPLEMENT; Breakfast, Lunch, Dinner; Diabetic Oral Supplement  ADULT ORAL NUTRITION SUPPLEMENT; Breakfast, Dinner; Wound Healing Oral Supplement    Anthropometric Measures:  Height: 4' 11\" (149.9 cm)  Ideal Body Weight (IBW): 95 lbs (43 kg)    Admission Body Weight: 82 lb 13 oz (37.6 kg)  Current Body Weight: 85 lb 8 oz (38.8 kg) (No Edema; 10/5), 90 % IBW. Weight Source: Bed Scale  Current BMI (kg/m2): 17.3  Usual Body Weight:  (Pt unsure of UBW.  Per EMR: 102 lb 3.2 oz on 11/30/21; 96 lb 12.8 oz on 6/15/22)     Weight Adjustment For: No Adjustment                 BMI Categories: Underweight (BMI less than 22) age over 72    Estimated Daily Nutrient Needs:  Energy Requirements Based On: Kcal/kg  Weight Used for Energy Requirements: Current (38.8 kg)  Energy (kcal/day): 2530-3702 (30-35 kcal/kg)  Weight Used for Protein Requirements: Current (38.8 kg)  Protein (g/day): 47 grams or more (1.2 g/kg)         Nutrition Diagnosis:   Severe malnutrition, In context of acute illness or injury related to inadequate protein-energy intake as evidenced by Criteria as identified in malnutrition assessment    Nutrition Interventions:   Food and/or Nutrient Delivery: Continue Current Diet, Continue Oral Nutrition Supplement  Nutrition Education/Counseling: Education not indicated (Pt educated 10/1 good po intake; Denied need for review of carb control diet as pt follows this diet at home and checks her blood sugar regularly)  Coordination of Nutrition Care: Continue to monitor while inpatient       Goals:     Goals: Meet at least 75% of estimated needs, by next RD assessment       Nutrition Monitoring and Evaluation:   Behavioral-Environmental Outcomes: None Identified  Food/Nutrient Intake Outcomes: Food and Nutrient Intake, Supplement Intake  Physical Signs/Symptoms Outcomes: Biochemical Data, GI Status, Weight, Skin, Fluid Status or Edema    Discharge Planning:     Too soon to determine     Irais Mohamud, [address]  Contact: [phone]

## 2022-10-05 NOTE — PLAN OF CARE
Problem: Discharge Planning  Goal: Discharge to home or other facility with appropriate resources  Outcome: Progressing  Flowsheets (Taken 10/5/2022 1050)  Discharge to home or other facility with appropriate resources: Identify barriers to discharge with patient and caregiver  Note: Discharge barriers identified with patient. To be discharged home with new hh.      Problem: Safety - Adult  Goal: Free from fall injury  Outcome: Progressing  Note: Patient absent of falls this shift, fall band intact, bed alarm set, falling star magnet in place. Problem: ABCDS Injury Assessment  Goal: Absence of physical injury  Outcome: Progressing  Flowsheets (Taken 10/5/2022 1050)  Absence of Physical Injury: Implement safety measures based on patient assessment  Note: Patient free from physical injury. Problem: Skin/Tissue Integrity  Goal: Absence of new skin breakdown  Description: 1. Monitor for areas of redness and/or skin breakdown  2. Assess vascular access sites hourly  3. Every 4-6 hours minimum:  Change oxygen saturation probe site  4. Every 4-6 hours:  If on nasal continuous positive airway pressure, respiratory therapy assess nares and determine need for appliance change or resting period. Outcome: Progressing  Note: Skin integrity intact. Patient educated to frequently turn in bed to reduce pressure ulcers. See flowsheets. Problem: Chronic Conditions and Co-morbidities  Goal: Patient's chronic conditions and co-morbidity symptoms are monitored and maintained or improved  Outcome: Progressing  Flowsheets (Taken 10/5/2022 1050)  Care Plan - Patient's Chronic Conditions and Co-Morbidity Symptoms are Monitored and Maintained or Improved: Monitor and assess patient's chronic conditions and comorbid symptoms for stability, deterioration, or improvement  Note: Labs monitored. Vitals within normal limits. Chem achs.       Problem: Pain  Goal: Verbalizes/displays adequate comfort level or baseline comfort level  Outcome: Progressing  Flowsheets (Taken 10/5/2022 1050)  Verbalizes/displays adequate comfort level or baseline comfort level:   Encourage patient to monitor pain and request assistance   Assess pain using appropriate pain scale   Implement non-pharmacological measures as appropriate and evaluate response  Note: Patient denies pain at this time. Patient educated on pain rating scale. Care plan reviewed with patient. Patient verbalize understanding of the plan of care and contribute to goal setting.

## 2022-10-06 LAB
ANION GAP SERPL CALCULATED.3IONS-SCNC: 10 MEQ/L (ref 8–16)
BUN BLDV-MCNC: 44 MG/DL (ref 7–22)
CALCIUM SERPL-MCNC: 9 MG/DL (ref 8.5–10.5)
CHLORIDE BLD-SCNC: 95 MEQ/L (ref 98–111)
CO2: 25 MEQ/L (ref 23–33)
CORTISOL COLLECTION INFO: NORMAL
CORTISOL COLLECTION INFO: NORMAL
CORTISOL: 14.39 UG/DL
CORTISOL: 7.5 UG/DL
CREAT SERPL-MCNC: 0.7 MG/DL (ref 0.4–1.2)
ERYTHROCYTE [DISTWIDTH] IN BLOOD BY AUTOMATED COUNT: 12.6 % (ref 11.5–14.5)
ERYTHROCYTE [DISTWIDTH] IN BLOOD BY AUTOMATED COUNT: 43.6 FL (ref 35–45)
GFR SERPL CREATININE-BSD FRML MDRD: 78 ML/MIN/1.73M2
GLUCOSE BLD-MCNC: 114 MG/DL (ref 70–108)
GLUCOSE BLD-MCNC: 140 MG/DL (ref 70–108)
GLUCOSE BLD-MCNC: 154 MG/DL (ref 70–108)
GLUCOSE BLD-MCNC: 208 MG/DL (ref 70–108)
GLUCOSE BLD-MCNC: 241 MG/DL (ref 70–108)
GLUCOSE BLD-MCNC: 327 MG/DL (ref 70–108)
HCT VFR BLD CALC: 26.8 % (ref 37–47)
HEMOGLOBIN: 8.9 GM/DL (ref 12–16)
MCH RBC QN AUTO: 31.6 PG (ref 26–33)
MCHC RBC AUTO-ENTMCNC: 33.2 GM/DL (ref 32.2–35.5)
MCV RBC AUTO: 95 FL (ref 81–99)
OSMOLALITY: 288 MOSMOL/KG (ref 275–295)
PLATELET # BLD: 349 THOU/MM3 (ref 130–400)
PMV BLD AUTO: 9.7 FL (ref 9.4–12.4)
POTASSIUM SERPL-SCNC: 4.8 MEQ/L (ref 3.5–5.2)
RBC # BLD: 2.82 MILL/MM3 (ref 4.2–5.4)
SODIUM BLD-SCNC: 126 MEQ/L (ref 135–145)
SODIUM BLD-SCNC: 127 MEQ/L (ref 135–145)
SODIUM BLD-SCNC: 128 MEQ/L (ref 135–145)
SODIUM BLD-SCNC: 130 MEQ/L (ref 135–145)
WBC # BLD: 9.1 THOU/MM3 (ref 4.8–10.8)

## 2022-10-06 PROCEDURE — 85027 COMPLETE CBC AUTOMATED: CPT

## 2022-10-06 PROCEDURE — 6370000000 HC RX 637 (ALT 250 FOR IP)

## 2022-10-06 PROCEDURE — 36415 COLL VENOUS BLD VENIPUNCTURE: CPT

## 2022-10-06 PROCEDURE — 80048 BASIC METABOLIC PNL TOTAL CA: CPT

## 2022-10-06 PROCEDURE — 97530 THERAPEUTIC ACTIVITIES: CPT

## 2022-10-06 PROCEDURE — 83930 ASSAY OF BLOOD OSMOLALITY: CPT

## 2022-10-06 PROCEDURE — 6360000002 HC RX W HCPCS

## 2022-10-06 PROCEDURE — 82533 TOTAL CORTISOL: CPT

## 2022-10-06 PROCEDURE — 1200000000 HC SEMI PRIVATE

## 2022-10-06 PROCEDURE — 2580000003 HC RX 258: Performed by: INTERNAL MEDICINE

## 2022-10-06 PROCEDURE — 84295 ASSAY OF SERUM SODIUM: CPT

## 2022-10-06 PROCEDURE — 82948 REAGENT STRIP/BLOOD GLUCOSE: CPT

## 2022-10-06 PROCEDURE — 6370000000 HC RX 637 (ALT 250 FOR IP): Performed by: INTERNAL MEDICINE

## 2022-10-06 PROCEDURE — 2580000003 HC RX 258

## 2022-10-06 RX ORDER — SODIUM CHLORIDE 9 MG/ML
INJECTION, SOLUTION INTRAVENOUS CONTINUOUS
Status: DISCONTINUED | OUTPATIENT
Start: 2022-10-06 | End: 2022-10-07 | Stop reason: HOSPADM

## 2022-10-06 RX ADMIN — PRAVASTATIN SODIUM 40 MG: 40 TABLET ORAL at 17:57

## 2022-10-06 RX ADMIN — INSULIN GLARGINE 7 UNITS: 100 INJECTION, SOLUTION SUBCUTANEOUS at 21:13

## 2022-10-06 RX ADMIN — INSULIN LISPRO 6 UNITS: 100 INJECTION, SOLUTION INTRAVENOUS; SUBCUTANEOUS at 13:23

## 2022-10-06 RX ADMIN — ACETAMINOPHEN 650 MG: 325 TABLET ORAL at 21:14

## 2022-10-06 RX ADMIN — LISINOPRIL 10 MG: 10 TABLET ORAL at 07:52

## 2022-10-06 RX ADMIN — INSULIN LISPRO 2 UNITS: 100 INJECTION, SOLUTION INTRAVENOUS; SUBCUTANEOUS at 17:57

## 2022-10-06 RX ADMIN — SODIUM CHLORIDE, PRESERVATIVE FREE 10 ML: 5 INJECTION INTRAVENOUS at 21:13

## 2022-10-06 RX ADMIN — SODIUM CHLORIDE: 9 INJECTION, SOLUTION INTRAVENOUS at 15:10

## 2022-10-06 RX ADMIN — ACETAMINOPHEN 650 MG: 325 TABLET ORAL at 15:12

## 2022-10-06 RX ADMIN — ASPIRIN 81 MG: 81 TABLET, COATED ORAL at 07:52

## 2022-10-06 RX ADMIN — ENOXAPARIN SODIUM 30 MG: 100 INJECTION SUBCUTANEOUS at 07:52

## 2022-10-06 RX ADMIN — SODIUM CHLORIDE, PRESERVATIVE FREE 10 ML: 5 INJECTION INTRAVENOUS at 07:52

## 2022-10-06 ASSESSMENT — PAIN - FUNCTIONAL ASSESSMENT
PAIN_FUNCTIONAL_ASSESSMENT: ACTIVITIES ARE NOT PREVENTED
PAIN_FUNCTIONAL_ASSESSMENT: ACTIVITIES ARE NOT PREVENTED

## 2022-10-06 ASSESSMENT — PAIN SCALES - GENERAL
PAINLEVEL_OUTOF10: 0
PAINLEVEL_OUTOF10: 4
PAINLEVEL_OUTOF10: 5

## 2022-10-06 ASSESSMENT — PAIN DESCRIPTION - FREQUENCY
FREQUENCY: CONTINUOUS
FREQUENCY: INTERMITTENT

## 2022-10-06 ASSESSMENT — PAIN DESCRIPTION - LOCATION
LOCATION: BACK
LOCATION: GENERALIZED

## 2022-10-06 ASSESSMENT — PAIN DESCRIPTION - DESCRIPTORS
DESCRIPTORS: ACHING
DESCRIPTORS: ACHING

## 2022-10-06 ASSESSMENT — PAIN DESCRIPTION - ONSET
ONSET: ON-GOING
ONSET: ON-GOING

## 2022-10-06 ASSESSMENT — PAIN DESCRIPTION - PAIN TYPE: TYPE: CHRONIC PAIN

## 2022-10-06 ASSESSMENT — PAIN DESCRIPTION - ORIENTATION: ORIENTATION: OTHER (COMMENT)

## 2022-10-06 NOTE — PROGRESS NOTES
[address] [place] [address] PROGRESS NOTE      Patient: Angelina Kahn  Room #: 9X-90/023-T            YOB: 1926  Age: 80 y.o. Gender: female            Admit Date & Time: 10/3/2022 11:24 AM    Assessment:  Cornelius You is a 80 yr. Old female being cared for on 8B. At the time of my visit, Cornelius You was sitting up in the chair next to her bed. Her family member in the room told me that Alayna's sodium and potasium levels have been an issue. Cornelius You and her family member were open to prayer for health and wellbeing. Interventions:  I spent time with Cornelius You and said a prayer for her sodium and potasium level to be where they need to be so that she can go home. Outcomes:  Cornelius You and her family member expressed gratitude for the visit and the prayer. Plan:    Continued support through presence and prayer.     Electronically signed by Sammi Urena on 10/6/2022 at 2:49 PM.  [facility] [facility]  [phone]

## 2022-10-06 NOTE — PROGRESS NOTES
[facility]  INPATIENT PHYSICAL THERAPY  DAILY NOTE  STRZ MED SURG 8B - 8B-37/037-A    Time In: 1000  Time Out: 9803  Timed Code Treatment Minutes: 19 Minutes  Minutes: 19          Date: 10/6/2022  Patient Name: Gema Elias,  Gender:  female        MRN: [id number]  : 1926  (80 y.o.)     Referring Practitioner: Dr. Ghazal Sweeney  Diagnosis: hyponatremia  Additional Pertinent Hx: per H&P: Gema Elias is a 80 y.o. female with PMHx of CKD stage IIIa, NIDDMII, HTN, HLD, and recent left facial mole undergoing work-up for possible malignancy who was recently discharged on 10/2/2022 presents to [facility] due to son being concerned for weakness and possible low blood pressure. She was recently admitted to [address] [place] [address] on 2022 and discharged on 10/02/2022. Patient was brought in by son for complaints of confusion and decreased mental mentation. Upon admission patient was AO x4. She was found to have a UTI and was started on Rocephin 1 g which was switched over to cefdinir 300 mg. Sodium was also found to be slightly on the lower side at 130. She also had elevated troponin that remained mildly elevated with no chest pain shortness of breath or ST-T wave changes on EKG. She was discharged on 10/02 once patient was stable and on cefdinir 300 mg for for 2 days. Antibiotic course will end tomorrow night. After being discharged yesterday, patient went home and slept for many hours had only half a glass of drink and no meal.  This morning patient had her medications and had a large bowel movement. Hospital nurse called and patient son stated that patient had low BP and sugars were in the high 100s, and patient was in and out of consciousness. Was recommended to call EMS to bring patient in.      Prior Level of Function:  Lives With: Son  Type of Home: House  Home Layout: One level  Home Access: Level entry  Home Equipment: [id number] Shower/Tub: Tub/Shower unit  Bathroom Toilet: Handicap height  Bathroom Accessibility: Accessible    Receives Help From: Family  ADL Assistance: Needs assistance  Homemaking Assistance: Needs assistance  Homemaking Responsibilities: No  Ambulation Assistance: Needs assistance  Transfer Assistance: Needs assistance  Active : No  Additional Comments: pt pivots from bed to chair wtih son assist, son provides 24/7 care, has chair cushions and cushions for pt's back also    Restrictions/Precautions:  Restrictions/Precautions: General Precautions, Fall Risk     SUBJECTIVE: RN approved session. Patient laying in bed upon arrival and agreeable to therapy. Patient incontinent of urine prior to arrival and required pericare. PAIN: not rated, reports BLE an back pain    Vitals: Vitals not assessed per clinical judgement, see nursing flowsheet    OBJECTIVE:  Bed Mobility:  Rolling to Left: Moderate Assistance, with head of bed raised, with rail, with verbal cues , with increased time for completion   Supine to Sit: Moderate Assistance, Maximum Assistance, X 1, with head of bed raised, with rail, with verbal cues , with increased time for completion  Scooting: Maximum Assistance, to edge of bed    Transfers:  Sit to Stand: Maximum Assistance, X 1, with increased time for completion, cues for hand placement, with verbal cues, from bed, completed x2 trials, demos posterior lean, stood at Mo-DV for pericare  Stand to Sit:Minimal Assistance  To/From Bed and Chair: Minimal Assistance, X 2, with RW, therapist assisted RN with transfer to chair. Balance:  Static Sitting Balance:  Stand By Assistance, Contact Guard Assistance, X 1, edge of bed in preparation for transfer, occasional posterior lean due to fatiguem cues for posture throughout  Static Standing Balance: Maximum Assistance, during pericare, posterior lean, cues for weight shift forward    Exercise:  Patient was guided in 1 set(s) 10 reps of exercise to both lower extremities.   Ankle pumps, Glut sets, Heelslides, Hip abduction/adduction, Straight leg raises, and Long arc quads. Exercises were completed for increased independence with functional mobility. Functional Outcome Measures: Completed  AM-PAC Inpatient Mobility without Stair Climbing Raw Score : 10  AM-PAC Inpatient without Stair Climbing T-Scale Score : 34.07    ASSESSMENT:  Assessment: Patient progressing toward established goals. Activity Tolerance:  Patient tolerance of  treatment: fair. Equipment Recommendations:Equipment Needed: No  Discharge Recommendations: 24 hour assistance or supervision, Home with Home Health PT, and Patient would benefit from continued PT at discharge  Plan: Specific Instructions for Next Treatment: therex, bed mobility, sitting balance, transfers, PT to assess gait  General Plan:  (3-5X GM)  Specific Instructions for Next Treatment: therex, bed mobility, sitting balance, transfers, PT to assess gait    Patient Education  Patient Education: Plan of Care, Bed Mobility, Transfers, Up in Chair for All Meals, Verbal Exercise Instruction    Goals:  Patient Goals : go home  Short Term Goals  Time Frame for Short Term Goals: by discharge  Short Term Goal 1: bed mobility with modA to get in/out of bed  Short Term Goal 2: std pivot transfer with modA to get bed to/from chair safely  Short Term Goal 3: tolerate >10 min sitting balance activity with </=S to demo inc functional activity tolerance  Short Term Goal 4: sit to/from std with modA to get in/out of chairs  Long Term Goals  Time Frame for Long Term Goals : no LTGs set secondary to short ELOS    Following session, patient left in safe position with all fall risk precautions in place.

## 2022-10-06 NOTE — PLAN OF CARE
Problem: Discharge Planning  Goal: Discharge to home or other facility with appropriate resources  Outcome: Progressing  Flowsheets (Taken 10/6/2022 0921)  Discharge to home or other facility with appropriate resources: Identify barriers to discharge with patient and caregiver  Note: Discharge barriers identified with patient. To be discharged home with home health. Problem: Safety - Adult  Goal: Free from fall injury  Outcome: Progressing  Flowsheets (Taken 10/6/2022 0921)  Free From Fall Injury: Instruct family/caregiver on patient safety  Note: Patient absent of falls this shift, fall band intact, bed alarm set, falling star magnet in place. Problem: ABCDS Injury Assessment  Goal: Absence of physical injury  Outcome: Progressing  Flowsheets (Taken 10/6/2022 0921)  Absence of Physical Injury: Implement safety measures based on patient assessment  Note: Patient absent of physical injury. Problem: Skin/Tissue Integrity  Goal: Absence of new skin breakdown  Description: 1. Monitor for areas of redness and/or skin breakdown  2. Assess vascular access sites hourly  3. Every 4-6 hours minimum:  Change oxygen saturation probe site  4. Every 4-6 hours:  If on nasal continuous positive airway pressure, respiratory therapy assess nares and determine need for appliance change or resting period. Outcome: Progressing  Note: Skin integrity intact. Patient educated to frequently turn in bed to reduce pressure ulcers. See flowsheets. Problem: Chronic Conditions and Co-morbidities  Goal: Patient's chronic conditions and co-morbidity symptoms are monitored and maintained or improved  Outcome: Progressing  Flowsheets (Taken 10/6/2022 0921)  Care Plan - Patient's Chronic Conditions and Co-Morbidity Symptoms are Monitored and Maintained or Improved: Monitor and assess patient's chronic conditions and comorbid symptoms for stability, deterioration, or improvement  Note: Labs monitored.  Sodium every 8 hours. Vitals within normal limits. Chem achs. Problem: Pain  Goal: Verbalizes/displays adequate comfort level or baseline comfort level  Outcome: Progressing  Flowsheets (Taken 10/6/2022 0921)  Verbalizes/displays adequate comfort level or baseline comfort level:   Encourage patient to monitor pain and request assistance   Assess pain using appropriate pain scale   Implement non-pharmacological measures as appropriate and evaluate response  Note: Patient denies pain at this time. Patient educated on pain rating scale. Problem: Nutrition Deficit:  Goal: Optimize nutritional status  Outcome: Progressing  Flowsheets (Taken 10/6/2022 0921)  Nutrient intake appropriate for improving, restoring, or maintaining nutritional needs:   Assess nutritional status and recommend course of action   Monitor oral intake, labs, and treatment plans  Note: I/Os. Tolerating diet. Care plan reviewed with patient. Patient verbalize understanding of the plan of care and contribute to goal setting.

## 2022-10-06 NOTE — CARE COORDINATION
10/6/22, 3:35 PM EDT    DISCHARGE PLANNING EVALUATION    This SW made a referral to [facility] voicemail for RN and Aide services, made them aware of probable discharge tomorrow. Spoke to Cite Christopher Kasper at [facility] and made them aware that patient does not want their services due to not having an aide.

## 2022-10-06 NOTE — PROGRESS NOTES
Glucose checked at 2056 to be 306. 7 units of Lantus and 4 units of Humalog given. Glucose recheck at 2158 to be 325. M. Heber Cotton MD messaged per perfectserv. Okay to give another 4 units of humalog. Will recheck glucose in one hour.

## 2022-10-06 NOTE — CARE COORDINATION
10/6/22, 3:26 PM EDT    [facility] left message, they can accept but do not have aides. Umu Rae Spoke with patients son, advised [facility] can accept however they do not have aide. Son stated he would like an aide. Next preference is [facility].

## 2022-10-06 NOTE — PROGRESS NOTES
Internal Medicine Resident Progress Note    Patient:  Sneha Galvin    YOB: 1926  Unit/Bed:8B-37/037-A  MRN: [id number]    Acct: [de-identified]   PCP: Mackenzie Barkley MD    Date of Admission: 10/3/2022      Assessment/Plan:    Acute hyponatremia 2/2 possible Inappropriate ADH secretion   - Long hx of NSAID usage.  - On POA Na+ of 126, was given 500 mL of bolus  NS ED.  - Was given IV fluids for 2 days. Discontinued on 10/5  - Na+ of 123 on 10/05  - Urine Electrolytes show: Urine sodium of 49 and urine osmolarity of 331. - Low-normal Osmolality of 275.   - Normal TSH and collecting am cortisol.   - Na+ improved to 130. on 10/06 in am.   Plan:  - Fluid restriction,   - Na+ check q6h  - Diet restricted to 1L fluid. Hypertension:   - On day of admission BP was checked by son at home and he states it was low, informed hospital nurse on phone about symptoms and was told to call EMS. - POA BP was 117/63 with HR of 92  - Pt has home med of lisinopril 30 mg, decreased to 10mg on this admission. Plan:  - Lisinopril 10 mg started, with hold parameters of SBP<110. Acute normocytic anemia:  - POA H&H of 10.7/32. 1.  - On 10/5 H&H of 8.8/25.4  - Iron studies show iron of 25 and ferritin of 144  Plan:  - Iron tablets of 325 mg every other day w. Breakfast.     Noninsulin-dependent DM II  - HbA1c of 7.1 on 06/22. - On last discharge Amaryl 2 mg was discontinued and metformin was recommended to be taken in the morning.  - On POA glucose of 210. Plan:  - Switched to medium-dose SSI and 7 units of Lantus. - Will keep inpatient goal of sugars 140-180  - Accuchecks x4.   - Hypoglycemia protocol  - Carb diet. Acute metabolic encephalopathy 2/2 UTI on treatment (Resolved)  - Patient was admitted on 9/29 UA at that time showed large LE and many bacteria. Urine culture was positive for enteric gram-negative bacilli. - She was started on Rocephin 1 g for 3 days. - Switch to p.o.  Cefdinir 300 mg on 10/02 for 2 more days. Finished on 10/04.  - On this admission patient's WBC remains elevated at 12.6.  - WBC trended down to 9.1 on 10/05. Dehydration (Resolved)  - Patient's son stated that after discharge patient had very little fluid intake, was mostly sleeping and had a large bowel movement this a.m.  - This morning BP was checked and son states it was low, informed hospital nurse on phone about symptoms and was told to call EMS. - POA Na+ of 126 and K+ of 5.2. Serum osmolality of 262.2.    - Was given 500 mL of bolus IV NS in ED.    - Fluids were discontinued to assess pts stability before discharge. Plan:  Daily monitoring of BMP    Chronic troponinemia 2/2 possible demand ischemia  - On last admission patient's troponin was 0.039-0.052. Troponin was equivocal with no ST-T wave changes on EKG. - On POA patient's troponin was 0.054, patient had no CP or SOB. - EKG showed NSR with PACs  Plan:  - We will monitor patient for any CP, SOB or anginal pain    CKD:   - On POA GFR > 90, BUN/CR of 20/0.6.  - Patient does take daily Aleve for arthritis, has been taking this medication for the last 20 years and it relieves the pain. Plan:  - Daily BMP monitoring    Hyperlipidemia: Home med of pravastatin 40 mg resumed    Left facial mole s/p biopsy:  - Serology results pending. Lesion is healing  - If malignancy is present surgical excision will be done on 10/26/2022  - Lesion is around 1 cm. Plan:  - Follows up with Dr. Isabel Hernandez. Right midlung nodular density:  - Small noncalcified nodular density in right midlung around 9 mm diameter  Plan:  - Outpatient follow-up, with possible CT chest      Expected discharge date:  2 days    Disposition:   [x] Home  [] TCU  [] Rehab  [] Psych  [] SNF  [] [place]  [] Other-    ===================================================================      Chief Complaint: Weakness and low blood pressure    Hospital Course:     Marla James is a 80 y.o. female with PMHx of CKD stage IIIa, NIDDMII, HTN, HLD, and recent left facial mole undergoing work-up for possible malignancy who was recently discharged on 10/2/2022 presents to [address] [place] [address] due to son being concerned for weakness and possible low blood pressure. She was recently admitted to [address] [place] [address] on 9/29/2022 and discharged on 10/02/2022. Patient was brought in by son for complaints of confusion and decreased mental mentation. Upon admission patient was AO x4. She was found to have a UTI and was started on Rocephin 1 g which was switched over to cefdinir 300 mg. Sodium was also found to be slightly on the lower side at 130. She also had elevated troponin that remained mildly elevated with no chest pain shortness of breath or ST-T wave changes on EKG. She was discharged on 10/02 once patient was stable and on cefdinir 300 mg for for 2 days. Antibiotic course will end tomorrow night. After being discharged yesterday, patient went home and slept for many hours had only half a glass of drink and no meal.  This morning patient had her medications and had a large bowel movement. Hospital nurse called and patient son stated that patient had low BP and sugars were in the high 100s, and patient was in and out of consciousness. Was recommended to call EMS to bring patient in. In the ED, patient was afebrile, heart rate of 92, BP of 117/63, 96% SPO2 on room air. Labs are concerning for Na+ of 126, K+ of 5.2, chloride of 90, glucose of 210. Serum osmolality was 262.2. Troponin was 0.054. CBC shows a WBC of 12.6, H&H of 10.7/32.1 and platelets of 378. EKG shows NSR with PACs. She was given 500 mL of bolus IV normal saline. Admitted to inpatient services for further work-up.  and palliative care are on board for further management upon discharge. 10/04:   -Patient was in good morale, corrected sodium has improved to 129. She is on 50 mL of IV NS. Doing well. Had discussion with son, about possible home health care. Patient and son do not want to send patient to a nursing facility. Blood pressure has steadily risen throughout the day up to 154/71, resumed home meds of lisinopril but at 20 mg instead of 30 mg. Antibiotic course has completed for UTI. 10/05:  -Patient remains in good work. In the a.m. sodium increased to 132, at at 3 PM sodium dropped down to 123. We will consider giving fluids and checking a.m. cortisol levels to see work-up for possible adrenal issues. 10/06:  - Pt is doing well. Am cortisol results pending. 5 am cortsiol is 7.5. Na+ went up to 130 on fluid restriction. Will continue fluid restricting. Lisinopril decreased to 10mg. Increased insulin was needed last night. Subjective (past 24 hours):     Patient is doing well has no acute complaints. No complaints of SOB and CP. Patient patient's son states she is doing well. ROS: reviewed complete ROS unchanged unless otherwise stated in hospital course/subjective portion.        Medications:  Reviewed    Infusion Medications    sodium chloride      dextrose       Scheduled Medications    insulin lispro  0-8 Units SubCUTAneous TID WC    insulin lispro  0-4 Units SubCUTAneous Nightly    insulin glargine  7 Units SubCUTAneous Nightly    lisinopril  10 mg Oral Daily    ferrous sulfate  325 mg Oral Q48H    pravastatin  40 mg Oral QPM    aspirin EC  81 mg Oral Daily    sodium chloride flush  5-40 mL IntraVENous 2 times per day    enoxaparin  30 mg SubCUTAneous Daily     PRN Meds: sodium chloride flush, sodium chloride, ondansetron **OR** ondansetron, polyethylene glycol, acetaminophen **OR** acetaminophen, glucose, dextrose bolus **OR** dextrose bolus, glucagon (rDNA), dextrose        Intake/Output Summary (Last 24 hours) at 10/6/2022 0723  Last data filed at 10/5/2022 2158  Gross per 24 hour   Intake 200 ml   Output 325 ml   Net -125 ml         Exam:  BP (!) 121/52   Pulse 93   Temp 98.1 °F (36.7 °C) (Oral)   Resp 16   Ht 4' 11\" (1.499 m)   Wt 88 lb 1.6 oz (40 kg)   SpO2 98%   BMI 17.79 kg/m²     General: No distress, appears stated age. Eyes:  PERRL. Conjunctivae/corneas clear. HENT: Head normal appearing. Nares normal. Oral mucosa dry. Hearing hearing mildly impaired. Neck: Supple, with full range of motion. Respiratory:  Normal effort. Clear to auscultation, no rales. Cardiovascular: RRR w. S1/S2 present. No lower extremity edema. Abdomen: Soft, non-tender, with some guarding present, non-distended with normal bowel sounds. Musculoskeletal: Normal tone. No abnormal movements. Skin: Warm and dry. Very thin skin. Visible veins present on both arms. Neurologic:  No focal sensory/motor deficits in the upper or lower extremities. Cranial nerves:  grossly non-focal 2-12. Psychiatric: Alert and oriented, normal insight and thought content. Capillary Refill: Brisk,< 3 seconds. Peripheral Pulses: +2 palpable, equal bilaterally. Labs:   Recent Labs     10/04/22  0534 10/05/22  0543 10/06/22  0529   WBC 11.1* 9.1 9.1   HGB 9.5* 8.8* 8.9*   HCT 28.0* 25.4* 26.8*    300 349       Recent Labs     10/04/22  0534 10/04/22  1500 10/05/22  0543 10/05/22  1526 10/05/22  1818 10/05/22  2309 10/06/22  0529   *   < > 132*   < > 124* 127* 130*   K 4.9  --  4.6  --   --   --  4.8   CL 92*  --  96*  --   --   --  95*   CO2 25  --  25  --   --   --  25   BUN 18  --  20  --   --   --  44*   CREATININE 0.6  --  0.6  --   --   --  0.7   CALCIUM 8.9  --  9.0  --   --   --  9.0    < > = values in this interval not displayed. Recent Labs     10/03/22  1252   AST 39   ALT 17   BILITOT 0.3   ALKPHOS 102       No results for input(s): INR in the last 72 hours. No results for input(s): Musa Pander in the last 72 hours. No results for input(s): PROCAL in the last 72 hours.    Lab Results   Component Value Date/Time    NITRU NEGATIVE 09/29/2022 12:10 PM    WBCUA > 200 09/29/2022 12:10 PM    WBCUA 0-5 12/31/2021 09:45 PM    BACTERIA MANY 09/29/2022 12:10 PM    RBCUA 3-5 09/29/2022 12:10 PM    BLOODU SMALL 09/29/2022 12:10 PM    GLUCOSEU NEGATIVE 09/29/2022 12:10 PM       Radiology (48 hours):  No results found. DVT prophylaxis:    [x] Lovenox  [] SCDs  [] SQ Heparin  [] Encourage ambulation   [] Already on Anticoagulation       Diet: ADULT ORAL NUTRITION SUPPLEMENT; Breakfast, Lunch, Dinner; Diabetic Oral Supplement  ADULT ORAL NUTRITION SUPPLEMENT; Breakfast, Dinner; Wound Healing Oral Supplement  ADULT DIET; Regular; 5 carb choices (75 gm/meal); 1000 ml  Code Status: DNR-CC  PT/OT: Consulted  Tele: N/A  IVF: Bolus fluid possible.     Electronically signed by Daryl Riley MD  PGY-1 Internal Medicine Resident  on 10/6/2022 at 7:41 AM    Case was discussed with Attending, Dr. Elroy Mcneil

## 2022-10-06 NOTE — CARE COORDINATION
10/6/22, 3:19 PM EDT    DISCHARGE ON GOING [place] day: 3  Location: [address] [place] [address] Reason for admit: Hyponatremia [E87.1]  Near syncope [R55]   Procedure: No.  Barriers to Discharge:  Na+ cont to fluctuate. 123 this am. Cortisol labs ordered. PT/OT following. PCP: Jolene Youssef MD  Readmission Risk Score: 16.1%  Patient Goals/Plan/Treatment Preferences: Plans return home with son and new HH. SW following.

## 2022-10-07 VITALS
WEIGHT: 99.43 LBS | SYSTOLIC BLOOD PRESSURE: 120 MMHG | TEMPERATURE: 98.1 F | HEIGHT: 59 IN | RESPIRATION RATE: 16 BRPM | OXYGEN SATURATION: 97 % | BODY MASS INDEX: 20.04 KG/M2 | HEART RATE: 99 BPM | DIASTOLIC BLOOD PRESSURE: 45 MMHG

## 2022-10-07 LAB
ANION GAP SERPL CALCULATED.3IONS-SCNC: 10 MEQ/L (ref 8–16)
BUN BLDV-MCNC: 37 MG/DL (ref 7–22)
CALCIUM SERPL-MCNC: 9 MG/DL (ref 8.5–10.5)
CHLORIDE BLD-SCNC: 96 MEQ/L (ref 98–111)
CO2: 25 MEQ/L (ref 23–33)
CREAT SERPL-MCNC: 0.6 MG/DL (ref 0.4–1.2)
ERYTHROCYTE [DISTWIDTH] IN BLOOD BY AUTOMATED COUNT: 12.6 % (ref 11.5–14.5)
ERYTHROCYTE [DISTWIDTH] IN BLOOD BY AUTOMATED COUNT: 42.8 FL (ref 35–45)
GFR SERPL CREATININE-BSD FRML MDRD: > 90 ML/MIN/1.73M2
GLUCOSE BLD-MCNC: 143 MG/DL (ref 70–108)
GLUCOSE BLD-MCNC: 146 MG/DL (ref 70–108)
HCT VFR BLD CALC: 26.3 % (ref 37–47)
HEMOGLOBIN: 9 GM/DL (ref 12–16)
MCH RBC QN AUTO: 31.9 PG (ref 26–33)
MCHC RBC AUTO-ENTMCNC: 34.2 GM/DL (ref 32.2–35.5)
MCV RBC AUTO: 93.3 FL (ref 81–99)
OSMOLALITY URINE: 364 MOSMOL/KG (ref 250–750)
PLATELET # BLD: 378 THOU/MM3 (ref 130–400)
PMV BLD AUTO: 9.4 FL (ref 9.4–12.4)
POTASSIUM SERPL-SCNC: 4.7 MEQ/L (ref 3.5–5.2)
RBC # BLD: 2.82 MILL/MM3 (ref 4.2–5.4)
SODIUM BLD-SCNC: 128 MEQ/L (ref 135–145)
SODIUM BLD-SCNC: 129 MEQ/L (ref 135–145)
SODIUM BLD-SCNC: 131 MEQ/L (ref 135–145)
SODIUM URINE: 49 MEQ/L
WBC # BLD: 7.8 THOU/MM3 (ref 4.8–10.8)

## 2022-10-07 PROCEDURE — 84155 ASSAY OF PROTEIN SERUM: CPT

## 2022-10-07 PROCEDURE — 85027 COMPLETE CBC AUTOMATED: CPT

## 2022-10-07 PROCEDURE — 36415 COLL VENOUS BLD VENIPUNCTURE: CPT

## 2022-10-07 PROCEDURE — 6370000000 HC RX 637 (ALT 250 FOR IP)

## 2022-10-07 PROCEDURE — 83935 ASSAY OF URINE OSMOLALITY: CPT

## 2022-10-07 PROCEDURE — 6370000000 HC RX 637 (ALT 250 FOR IP): Performed by: INTERNAL MEDICINE

## 2022-10-07 PROCEDURE — 84295 ASSAY OF SERUM SODIUM: CPT

## 2022-10-07 PROCEDURE — 6360000002 HC RX W HCPCS

## 2022-10-07 PROCEDURE — 84300 ASSAY OF URINE SODIUM: CPT

## 2022-10-07 PROCEDURE — 82948 REAGENT STRIP/BLOOD GLUCOSE: CPT

## 2022-10-07 PROCEDURE — 84165 PROTEIN E-PHORESIS SERUM: CPT

## 2022-10-07 PROCEDURE — 2580000003 HC RX 258

## 2022-10-07 PROCEDURE — 80048 BASIC METABOLIC PNL TOTAL CA: CPT

## 2022-10-07 PROCEDURE — 99239 HOSP IP/OBS DSCHRG MGMT >30: CPT | Performed by: INTERNAL MEDICINE

## 2022-10-07 RX ORDER — LISINOPRIL 10 MG/1
10 TABLET ORAL DAILY
Qty: 30 TABLET | Refills: 3 | Status: SHIPPED | OUTPATIENT
Start: 2022-10-08 | End: 2022-10-12 | Stop reason: SDUPTHER

## 2022-10-07 RX ORDER — FERROUS SULFATE 325(65) MG
325 TABLET ORAL
Qty: 30 TABLET | Refills: 3 | Status: SHIPPED | OUTPATIENT
Start: 2022-10-07

## 2022-10-07 RX ADMIN — LISINOPRIL 10 MG: 10 TABLET ORAL at 08:24

## 2022-10-07 RX ADMIN — ENOXAPARIN SODIUM 30 MG: 100 INJECTION SUBCUTANEOUS at 08:21

## 2022-10-07 RX ADMIN — ACETAMINOPHEN 650 MG: 325 TABLET ORAL at 08:21

## 2022-10-07 RX ADMIN — SODIUM CHLORIDE, PRESERVATIVE FREE 10 ML: 5 INJECTION INTRAVENOUS at 08:21

## 2022-10-07 RX ADMIN — ASPIRIN 81 MG: 81 TABLET, COATED ORAL at 08:21

## 2022-10-07 ASSESSMENT — PAIN - FUNCTIONAL ASSESSMENT: PAIN_FUNCTIONAL_ASSESSMENT: PREVENTS OR INTERFERES SOME ACTIVE ACTIVITIES AND ADLS

## 2022-10-07 ASSESSMENT — PAIN DESCRIPTION - LOCATION: LOCATION: GENERALIZED

## 2022-10-07 ASSESSMENT — PAIN SCALES - GENERAL
PAINLEVEL_OUTOF10: 0
PAINLEVEL_OUTOF10: 3

## 2022-10-07 ASSESSMENT — PAIN DESCRIPTION - DESCRIPTORS: DESCRIPTORS: ACHING

## 2022-10-07 NOTE — PLAN OF CARE
Problem: Discharge Planning  Goal: Discharge to home or other facility with appropriate resources  10/7/2022 1134 by Yahaira Stanton RN  Outcome: Adequate for Discharge  10/7/2022 0703 by Tish Finch RN  Outcome: Progressing  Flowsheets (Taken 10/6/2022 2100)  Discharge to home or other facility with appropriate resources:   Identify barriers to discharge with patient and caregiver   Arrange for needed discharge resources and transportation as appropriate   Identify discharge learning needs (meds, wound care, etc)   Refer to discharge planning if patient needs post-hospital services based on physician order or complex needs related to functional status, cognitive ability or social support system     Problem: Safety - Adult  Goal: Free from fall injury  10/7/2022 1134 by Yahaira Stanton RN  Outcome: Adequate for Discharge  10/7/2022 1054 by Yahaira Stanton RN  Outcome: Progressing  10/7/2022 0703 by Tish Finch RN  Outcome: Progressing     Problem: ABCDS Injury Assessment  Goal: Absence of physical injury  10/7/2022 1134 by Yahaira Stanton RN  Outcome: Adequate for Discharge  10/7/2022 0703 by Tish Finch RN  Outcome: Progressing     Problem: Skin/Tissue Integrity  Goal: Absence of new skin breakdown  Description: 1. Monitor for areas of redness and/or skin breakdown  2. Assess vascular access sites hourly  3. Every 4-6 hours minimum:  Change oxygen saturation probe site  4. Every 4-6 hours:  If on nasal continuous positive airway pressure, respiratory therapy assess nares and determine need for appliance change or resting period.   10/7/2022 1134 by Yahaira Stanton RN  Outcome: Adequate for Discharge  10/7/2022 0703 by Tish Finch RN  Outcome: Progressing     Problem: Chronic Conditions and Co-morbidities  Goal: Patient's chronic conditions and co-morbidity symptoms are monitored and maintained or improved  10/7/2022 1134 by Yahaira Stanton RN  Outcome: Adequate for Discharge  10/7/2022 8413 by Anila Magana, RN  Outcome: Progressing  Flowsheets (Taken 10/6/2022 2100)  Care Plan - Patient's Chronic Conditions and Co-Morbidity Symptoms are Monitored and Maintained or Improved:   Monitor and assess patient's chronic conditions and comorbid symptoms for stability, deterioration, or improvement   Collaborate with multidisciplinary team to address chronic and comorbid conditions and prevent exacerbation or deterioration   Update acute care plan with appropriate goals if chronic or comorbid symptoms are exacerbated and prevent overall improvement and discharge     Problem: Pain  Goal: Verbalizes/displays adequate comfort level or baseline comfort level  10/7/2022 1134 by Suresh Montes De Oca RN  Outcome: Adequate for Discharge  10/7/2022 0703 by Anila Magana RN  Outcome: Progressing     Problem: Nutrition Deficit:  Goal: Optimize nutritional status  10/7/2022 1134 by Suresh Montes De Oca RN  Outcome: Adequate for Discharge  10/7/2022 0703 by Anila Magana RN  Outcome: Progressing

## 2022-10-07 NOTE — PROGRESS NOTES
patient's WBC remains elevated at 12.6.  - WBC trended down to 9.1 on 10/05. Dehydration (Resolved)  - Patient's son stated that after discharge patient had very little fluid intake, was mostly sleeping and had a large bowel movement this a.m.  - This morning BP was checked and son states it was low, informed hospital nurse on phone about symptoms and was told to call EMS. - POA Na+ of 126 and K+ of 5.2. Serum osmolality of 262.2.    - Was given 500 mL of bolus IV NS in ED.    - Fluids were discontinued to assess pts stability before discharge. Plan:  Daily monitoring of BMP    Chronic troponinemia 2/2 possible demand ischemia  - On last admission patient's troponin was 0.039-0.052. Troponin was equivocal with no ST-T wave changes on EKG. - On POA patient's troponin was 0.054, patient had no CP or SOB. - EKG showed NSR with PACs  Plan:  - We will monitor patient for any CP, SOB or anginal pain    CKD:   - On POA GFR > 90, BUN/CR of 20/0.6.  - Patient does take daily Aleve for arthritis, has been taking this medication for the last 20 years and it relieves the pain. Plan:  - Daily BMP monitoring    Hyperlipidemia: Home med of pravastatin 40 mg resumed    Left facial mole s/p biopsy:  - Serology results pending. Lesion is healing  - If malignancy is present surgical excision will be done on 10/26/2022  - Lesion is around 1 cm. Plan:  - Follows up with Dr. Domenic Wiseman.      Right midlung nodular density:  - Small noncalcified nodular density in right midlung around 9 mm diameter  Plan:  - Outpatient follow-up, with possible CT chest      Expected discharge date:  2 days    Disposition:   [x] Home  [] TCU  [] Rehab  [] Psych  [] SNF  [] [facility]  [] Other-    ===================================================================      Chief Complaint: Weakness and low blood pressure    Hospital Course:     Gema Elias is a 80 y.o. female with PMHx of CKD stage IIIa, NIDDMII, HTN, HLD, and recent left facial mole undergoing work-up for possible malignancy who was recently discharged on 10/2/2022 presents to [facility] due to son being concerned for weakness and possible low blood pressure. She was recently admitted to [address] [place] [address] on 9/29/2022 and discharged on 10/02/2022. Patient was brought in by son for complaints of confusion and decreased mental mentation. Upon admission patient was AO x4. She was found to have a UTI and was started on Rocephin 1 g which was switched over to cefdinir 300 mg. Sodium was also found to be slightly on the lower side at 130. She also had elevated troponin that remained mildly elevated with no chest pain shortness of breath or ST-T wave changes on EKG. She was discharged on 10/02 once patient was stable and on cefdinir 300 mg for for 2 days. Antibiotic course will end tomorrow night. After being discharged yesterday, patient went home and slept for many hours had only half a glass of drink and no meal.  This morning patient had her medications and had a large bowel movement. Hospital nurse called and patient son stated that patient had low BP and sugars were in the high 100s, and patient was in and out of consciousness. Was recommended to call EMS to bring patient in. In the ED, patient was afebrile, heart rate of 92, BP of 117/63, 96% SPO2 on room air. Labs are concerning for Na+ of 126, K+ of 5.2, chloride of 90, glucose of 210. Serum osmolality was 262.2. Troponin was 0.054. CBC shows a WBC of 12.6, H&H of 10.7/32.1 and platelets of 904. EKG shows NSR with PACs. She was given 500 mL of bolus IV normal saline. Admitted to inpatient services for further work-up.  and palliative care are on board for further management upon discharge. 10/04:   -Patient was in good morale, corrected sodium has improved to 129. She is on 50 mL of IV NS. Doing well. Had discussion with son, about possible home health care.   Patient and son do not want to send patient to a nursing facility. Blood pressure has steadily risen throughout the day up to 154/71, resumed home meds of lisinopril but at 20 mg instead of 30 mg. Antibiotic course has completed for UTI. 10/05:  -Patient remains in good work. In the a.m. sodium increased to 132, at at 3 PM sodium dropped down to 123. We will consider giving fluids and checking a.m. cortisol levels to see work-up for possible adrenal issues. 10/06:  - Pt is doing well. Am cortisol results pending. 5 am cortsiol is 7.5. Na+ went up to 130 on fluid restriction. Will continue fluid restricting. Lisinopril decreased to 10mg. Increased insulin was needed last night. Subjective (past 24 hours):     Patient is doing well has no acute complaints. No complaints of SOB and CP. Patient patient's son states she is doing well. ROS: reviewed complete ROS unchanged unless otherwise stated in hospital course/subjective portion.        Medications:  Reviewed    Infusion Medications    sodium chloride 30 mL/hr at 10/07/22 4952    sodium chloride      dextrose       Scheduled Medications    insulin lispro  0-8 Units SubCUTAneous TID WC    insulin lispro  0-4 Units SubCUTAneous Nightly    insulin glargine  7 Units SubCUTAneous Nightly    lisinopril  10 mg Oral Daily    ferrous sulfate  325 mg Oral Q48H    pravastatin  40 mg Oral QPM    aspirin EC  81 mg Oral Daily    sodium chloride flush  5-40 mL IntraVENous 2 times per day    enoxaparin  30 mg SubCUTAneous Daily     PRN Meds: sodium chloride flush, sodium chloride, ondansetron **OR** ondansetron, polyethylene glycol, acetaminophen **OR** acetaminophen, glucose, dextrose bolus **OR** dextrose bolus, glucagon (rDNA), dextrose        Intake/Output Summary (Last 24 hours) at 10/7/2022 0904  Last data filed at 10/7/2022 2526  Gross per 24 hour   Intake 660.45 ml   Output --   Net 660.45 ml         Exam:  BP (!) 120/45   Pulse 99   Temp 98.1 °F (36.7 °C) (Oral)   Resp 16   Ht 4' 11\" (1.499 m)   Wt 99 lb 6.8 oz (45.1 kg)   SpO2 97%   BMI 20.08 kg/m²     General: No distress, appears stated age. Eyes:  PERRL. Conjunctivae/corneas clear. HENT: Head normal appearing. Nares normal. Oral mucosa dry. Hearing hearing mildly impaired. Neck: Supple, with full range of motion. Respiratory:  Normal effort. Clear to auscultation, no rales. Cardiovascular: RRR w. S1/S2 present. No lower extremity edema. Abdomen: Soft, non-tender, with some guarding present, non-distended with normal bowel sounds. Musculoskeletal: Normal tone. No abnormal movements. Skin: Warm and dry. Very thin skin. Visible veins present on both arms. Neurologic:  No focal sensory/motor deficits in the upper or lower extremities. Cranial nerves:  grossly non-focal 2-12. Psychiatric: Alert and oriented, normal insight and thought content. Capillary Refill: Brisk,< 3 seconds. Peripheral Pulses: +2 palpable, equal bilaterally. Labs:   Recent Labs     10/05/22  0543 10/06/22  0529 10/07/22  0532   WBC 9.1 9.1 7.8   HGB 8.8* 8.9* 9.0*   HCT 25.4* 26.8* 26.3*    349 378       Recent Labs     10/05/22  0543 10/05/22  1526 10/06/22  0529 10/06/22  1450 10/06/22  2039 10/06/22  2330 10/07/22  0532   *   < > 130*   < > 128* 129* 131*   K 4.6  --  4.8  --   --   --  4.7   CL 96*  --  95*  --   --   --  96*   CO2 25  --  25  --   --   --  25   BUN 20  --  44*  --   --   --  37*   CREATININE 0.6  --  0.7  --   --   --  0.6   CALCIUM 9.0  --  9.0  --   --   --  9.0    < > = values in this interval not displayed. No results for input(s): AST, ALT, BILIDIR, BILITOT, ALKPHOS in the last 72 hours. No results for input(s): INR in the last 72 hours. No results for input(s): Othelia Nutley in the last 72 hours. No results for input(s): PROCAL in the last 72 hours.    Lab Results   Component Value Date/Time    NITRU NEGATIVE 09/29/2022 12:10 PM    WBCUA > 200 09/29/2022 12:10 PM    WBCUA 0-5 12/31/2021 09:45 PM    BACTERIA MANY 09/29/2022 12:10 PM    RBCUA 3-5 09/29/2022 12:10 PM    BLOODU SMALL 09/29/2022 12:10 PM    GLUCOSEU NEGATIVE 09/29/2022 12:10 PM       Radiology (48 hours):  No results found. DVT prophylaxis:    [x] Lovenox  [] SCDs  [] SQ Heparin  [] Encourage ambulation   [] Already on Anticoagulation       Diet: ADULT ORAL NUTRITION SUPPLEMENT; Breakfast, Lunch, Dinner; Diabetic Oral Supplement  ADULT ORAL NUTRITION SUPPLEMENT; Breakfast, Dinner; Wound Healing Oral Supplement  ADULT DIET; Regular; 5 carb choices (75 gm/meal); 1000 ml  Code Status: DNR-CC  PT/OT: Consulted  Tele: N/A  IVF: Bolus fluid possible.     Electronically signed by Davey Holter MD  PGY-1 Internal Medicine Resident  on 10/7/2022 at 9:04 AM    Case was discussed with Attending, Dr. Garcia Grate

## 2022-10-07 NOTE — PLAN OF CARE
Problem: Safety - Adult  Goal: Free from fall injury  10/7/2022 1054 by Ryan Henry RN  Outcome: Progressing  10/7/2022 0703 by Mahad Riley RN  Outcome: Progressing    Safety maintained, patient uses call light for assistance. Problem: Skin/Tissue Integrity  Goal: Absence of new skin breakdown  Description: 1. Monitor for areas of redness and/or skin breakdown  2. Assess vascular access sites hourly  3. Every 4-6 hours minimum:  Change oxygen saturation probe site  4. Every 4-6 hours:  If on nasal continuous positive airway pressure, respiratory therapy assess nares and determine need for appliance change or resting period. 10/7/2022 0703 by Mahad Riley RN  Outcome: Progressing     Patient turned and repositioned every 2 hours.

## 2022-10-07 NOTE — DISCHARGE SUMMARY
Internal Medicine Resident Discharge Summary      Patient Identification:   Jordan Barry   : 1926  MRN: [id number]   Account: [de-identified]      Patient's PCP: Anne-Marie Abad MD    Admit Date: 10/3/2022     Discharge Date: 10/7/2022     Admitting Physician: Ermelinda Oconnell MD     Discharge Physician: Juanita Hawkins MD       Hospital Course:     Jordan Barry is a 80 y.o. female with PMHx of CKD stage IIIa, NIDDMII, HTN, HLD, and recent left facial mole undergoing work-up for possible malignancy who was recently discharged on 10/2/2022 presents to [address] [place] [address] due to son being concerned for weakness and possible low blood pressure. She was [facility] to [facility] and discharged on 10/02/2022. Patient was brought in by son for complaints of confusion and decreased mental mentation. Upon admission patient was AO x4. She was found to have a UTI and was started on Rocephin 1 g which was switched over to cefdinir 300 mg. Sodium was also found to be slightly on the lower side at 130. She also had elevated troponin that remained mildly elevated with no chest pain shortness of breath or ST-T wave changes on EKG. She was discharged on 10/02 once patient was stable and on cefdinir 300 mg for for 2 days. Antibiotic course will end tomorrow night. After being discharged yesterday, patient went home and slept for many hours had only half a glass of drink and no meal.  This morning patient had her medications and had a large bowel movement. Hospital nurse called and patient son stated that patient had low BP and sugars were in the high 100s, and patient was in and out of consciousness. Was recommended to call EMS to bring patient in. In the ED, patient was afebrile, heart rate of 92, BP of 117/63, 96% SPO2 on room air. Labs are concerning for Na+ of 126, K+ of 5.2, chloride of 90, glucose of 210. Serum osmolality was 262.2. Troponin was 0.054.   CBC shows a WBC of 12.6, H&H of 10.7/32.1 and platelets of 620. EKG shows NSR with PACs. She was given 500 mL of bolus IV normal saline. Admitted to inpatient services for further work-up. During this admission. Patient's sodium has been hyponatremic and labile. Initially patient was started on low-dose of IV NS. Blood pressure became elevated with fluids, home medication of lisinopril was resumed. While on fluids patient's sodium dropped down to 123. Urine electrolytes were collected, urine sodium of 49 and urine osmolarity of 361. With concern of SIADH due to unknown cause, patient was put on fluid restriction. Sodium improved slightly. TSH (3.04) and a.m. cortisol (14.39) within normal limits. Discussion was made with patient's family and based on her history of labile sodium for the last year, it was decided that patient be discharged today and a BMP should be collected on Monday for further review with PCP on the 12th of October.  and palliative care were on board for Home health services on discharge. Discharge Diagnoses:    Acute hyponatremia 2/2 possible Inappropriate ADH secretion   - Possibly due to 20 yr hx of Tylenol usage. On POA Na+ of 126, was given 500 mL of bolus  NS ED. Urine Electrolytes show: Urine sodium of 49 and urine osmolarity of 331. Low-normal Osmolality of 275. Normal TSH and collecting am cortisol. Labile sodium. Plan:  - BMP on 10/09 and Follow-up with PCP after results. Hypertension:   - On day of admission BP was checked by son at home and he states it was low, informed hospital nurse on phone about symptoms and was told to call EMS. - POA BP was 117/63 with HR of 92  - Pt has home med of lisinopril 30 mg, decreased to 10mg on this admission. Plan:  - Lisinopril 10 mg with hold parameters of SBP<110. Follow-up w. PCP if SBP <100. Acute normocytic anemia:  - POA H&H of 10.7/32. 1.  - Iron studies show iron of 25 and ferritin of 144  Plan:  - Iron tablets of 325 mg every other day w. breakfast.       Noninsulin-dependent DM II  - HbA1c of 7.1 on 06/22. - On last discharge Amaryl 2 mg was discontinued and metformin was recommended to be taken in the morning.  - On POA glucose of 210. Plan:  - Pt should go back on home med of Metformin 500 mg TID. Acute metabolic encephalopathy 2/2 UTI on treatment (Resolved)  - Patient was admitted on 9/29 UA at that time showed large LE and many bacteria. Urine culture was positive for E. coli. - She was started on Rocephin 1 g for 3 days. - Switch to p.o. Cefdinir 300 mg on 10/02 for 2 more days. Finished on 10/04.  - WBC trended down to 9.1 on 10/05. Dehydration (Resolved)  - Patient's son stated that after discharge patient had very little fluid intake, was mostly sleeping and had a large bowel movement this a.m.  - POA Na+ of 126 and K+ of 5.2. Serum osmolality of 262.2. Was given 500 mL of bolus IV NS in ED. Chronic troponinemia 2/2 possible demand ischemia  - On last admission patient's troponin was 0.039-0.052. Troponin was equivocal with no ST-T wave changes on EKG. - On POA patient's troponin was 0.054, patient had no CP or SOB. - EKG showed NSR with PACs     CKD:   - On POA GFR > 90, BUN/CR of 20/0. 6. Hyperlipidemia: Home med of pravastatin 40 mg resumed     Left facial mole s/p biopsy:  - Serology results pending. Lesion is healing  - If malignancy is present surgical excision will be done on 10/26/2022  - Lesion is around 1 cm. Plan:  - Follows up with Dr. Thiago Hernandez. Right midlung nodular density:  - Small noncalcified nodular density in right midlung around 9 mm diameter  Plan:  - Outpatient follow-up, with possible CT chest    The patient was seen and examined on day of discharge and this discharge summary is in conjunction with any daily progress note from day of discharge. The patient is discharged in stable condition.      Exam:     Vitals:  Vitals:    10/06/22 1539 10/06/22 2100 10/07/22 0310 10/07/22 0817   BP: (!) 121/58 (!) 132/48 (!) 109/47 (!) 120/45   Pulse: 93 92 82 99   Resp: 17 16     Temp: 98.2 °F (36.8 °C) 97.7 °F (36.5 °C) 97.9 °F (36.6 °C) 98.1 °F (36.7 °C)   TempSrc: Oral Oral Axillary Oral   SpO2: 98%  97% 97%   Weight:   99 lb 6.8 oz (45.1 kg)    Height:         Weight: Weight: 99 lb 6.8 oz (45.1 kg)     24 hour intake/output:  Intake/Output Summary (Last 24 hours) at 10/7/2022 1534  Last data filed at 10/7/2022 2058  Gross per 24 hour   Intake 660.45 ml   Output --   Net 660.45 ml       General: No distress, appears stated age. Eyes:  PERRL. Conjunctivae/corneas clear. HENT: Head normal appearing. Nares normal. Oral mucosa dry. Hearing hearing mildly impaired. Neck: Supple, with full range of motion. Respiratory:  Normal effort. Clear to auscultation, no rales. Cardiovascular: RRR w. S1/S2 present. No lower extremity edema. Abdomen: Soft, non-tender, with some guarding present, non-distended with normal bowel sounds. Musculoskeletal: Normal tone. No abnormal movements. Skin: Warm and dry. Very thin skin. Visible veins present on both arms. Neurologic:  No focal sensory/motor deficits in the upper or lower extremities. Cranial nerves:  grossly non-focal 2-12. Psychiatric: Alert and oriented, normal insight and thought content. Capillary Refill: Brisk,< 3 seconds. Peripheral Pulses: +2 palpable, equal bilaterally. Labs:  For convenience and continuity at follow-up the following most recent labs are provided:    CBC:    Lab Results   Component Value Date/Time    WBC 7.8 10/07/2022 05:32 AM    HGB 9.0 10/07/2022 05:32 AM    HCT 26.3 10/07/2022 05:32 AM     10/07/2022 05:32 AM       Renal:    Lab Results   Component Value Date/Time     10/07/2022 11:22 AM    K 4.7 10/07/2022 05:32 AM    K 4.3 10/02/2022 05:12 AM    CL 96 10/07/2022 05:32 AM    CO2 25 10/07/2022 05:32 AM    BUN 37 10/07/2022 05:32 AM    CREATININE 0.6 10/07/2022 05:32 AM    CALCIUM 9.0 10/07/2022 05:32 AM         Significant Diagnostic Studies    Radiology:   No orders to display          Consults:     IP CONSULT TO SOCIAL WORK  PALLIATIVE CARE EVAL  IP CONSULT TO HOME CARE NEEDS    Disposition: Home  Condition at Discharge: Stable    Code Status:  DNR-CC     Patient Instructions:    Discharge lab work: BMP  Activity: activity as tolerated  Diet: ADULT ORAL NUTRITION SUPPLEMENT; Breakfast, Lunch, Dinner; Diabetic Oral Supplement  ADULT ORAL NUTRITION SUPPLEMENT; Breakfast, Dinner; Wound Healing Oral Supplement  ADULT DIET;  Regular; 5 carb choices (75 gm/meal); 1000 ml      Follow-up visits:   Jaun Cerda MD  [address] [facility] [address]  [phone]    Follow up on 10/12/2022  Follow up appointment October 12, 2022 at 9:45am.    [facility] [address] [address]  [phone]          Discharge Medications:        Medication List        START taking these medications      ferrous sulfate 325 (65 Fe) MG tablet  Commonly known as: IRON 325  Take 1 tablet by mouth every 48 hours            CHANGE how you take these medications      lisinopril 10 MG tablet  Commonly known as: PRINIVIL;ZESTRIL  Take 1 tablet by mouth daily  Start taking on: October 8, 2022  What changed:   medication strength  how much to take            CONTINUE taking these medications      ALEVE PO     aspirin EC 81 MG EC tablet     FreeStyle Lancets Misc  1 each by Does not apply route daily Dx: E11.29, check bs daily     FREESTYLE LITE strip  Generic drug: blood glucose test strips  1 each by In Vitro route daily Dx: E11.29, check BS daily     metFORMIN 500 MG extended release tablet  Commonly known as: GLUCOPHAGE-XR  Take 3 tabs with supper     multivitamin per tablet     pravastatin 40 MG tablet  Commonly known as: Pravachol  Take 1 tablet by mouth every evening     Wheelchair Misc  R40.4, R53.1, H54.10            STOP taking these medications cefdinir 300 MG capsule  Commonly known as: OMNICEF               Where to Get Your Medications        These medications were sent to [facility] [address], [address]  [address] [place] [address]  [address], [address] [address]      Phone: [phone]   ferrous sulfate 325 (65 Fe) MG tablet  lisinopril 10 MG tablet               Time Spent on discharge is 35 minutes in the examination, evaluation, counseling and review of medications and discharge plan. Thank you Anne-Marie Abad MD for the opportunity to be involved in this patient's care.       Signed:    Electronically signed by Juanita Hawkins MD  PGY-1 Internal Medicine Resident   on 10/7/22 at 3:34 PM EDT     Case was discussed with Attending, Dr. Ezio Adamson

## 2022-10-07 NOTE — PLAN OF CARE
Problem: Discharge Planning  Goal: Discharge to home or other facility with appropriate resources  Outcome: Progressing  Flowsheets (Taken 10/6/2022 2100)  Discharge to home or other facility with appropriate resources:   Identify barriers to discharge with patient and caregiver   Arrange for needed discharge resources and transportation as appropriate   Identify discharge learning needs (meds, wound care, etc)   Refer to discharge planning if patient needs post-hospital services based on physician order or complex needs related to functional status, cognitive ability or social support system     Problem: Safety - Adult  Goal: Free from fall injury  Outcome: Progressing     Problem: ABCDS Injury Assessment  Goal: Absence of physical injury  Outcome: Progressing     Problem: Skin/Tissue Integrity  Goal: Absence of new skin breakdown  Description: 1. Monitor for areas of redness and/or skin breakdown  2. Assess vascular access sites hourly  3. Every 4-6 hours minimum:  Change oxygen saturation probe site  4. Every 4-6 hours:  If on nasal continuous positive airway pressure, respiratory therapy assess nares and determine need for appliance change or resting period.   Outcome: Progressing     Problem: Chronic Conditions and Co-morbidities  Goal: Patient's chronic conditions and co-morbidity symptoms are monitored and maintained or improved  Outcome: Progressing  Flowsheets (Taken 10/6/2022 2100)  Care Plan - Patient's Chronic Conditions and Co-Morbidity Symptoms are Monitored and Maintained or Improved:   Monitor and assess patient's chronic conditions and comorbid symptoms for stability, deterioration, or improvement   Collaborate with multidisciplinary team to address chronic and comorbid conditions and prevent exacerbation or deterioration   Update acute care plan with appropriate goals if chronic or comorbid symptoms are exacerbated and prevent overall improvement and discharge     Problem: Pain  Goal: Verbalizes/displays adequate comfort level or baseline comfort level  Outcome: Progressing     Problem: Nutrition Deficit:  Goal: Optimize nutritional status  Outcome: Progressing

## 2022-10-07 NOTE — CARE COORDINATION
10/7/22, 2:40 PM EDT    Patient goals/plan/ treatment preferences discussed by  and . Patient goals/plan/ treatment preferences reviewed with patient/ family. Patient/ family verbalize understanding of discharge plan and are in agreement with goal/plan/treatment preferences. Understanding was demonstrated using the teach back method. AVS provided by RN at time of discharge, which includes all necessary medical information pertaining to the patients current course of illness, treatment, post-discharge goals of care, and treatment preferences. Services At/After Discharge: Home Health, Aide services, Nursing service, OT, and PT       IMM Letter  IMM Letter given to Patient/Family/Significant other/Guardian/POA/by[de-identified] Alaina GIORDANO Case Manager. IMM Letter date given[de-identified] 10/07/22  IMM Letter time given[de-identified] 1122     Patient discharged home with [facility]. Left message at [address] [facility] of discharge. Son transport home.

## 2022-10-07 NOTE — PROGRESS NOTES
Patient and patient's POA provided with discharge instructions, states verbal understanding. Patient alert and waiting on transportation.

## 2022-10-10 ENCOUNTER — NURSE ONLY (OUTPATIENT)
Dept: LAB | Age: 87
End: 2022-10-10

## 2022-10-10 ENCOUNTER — CARE COORDINATION (OUTPATIENT)
Dept: CASE MANAGEMENT | Age: 87
End: 2022-10-10

## 2022-10-10 DIAGNOSIS — E87.1 HYPONATREMIA: ICD-10-CM

## 2022-10-10 DIAGNOSIS — R41.82 ALTERED MENTAL STATUS, UNSPECIFIED ALTERED MENTAL STATUS TYPE: Primary | ICD-10-CM

## 2022-10-10 LAB
ANION GAP SERPL CALCULATED.3IONS-SCNC: 12 MEQ/L (ref 8–16)
BUN BLDV-MCNC: 17 MG/DL (ref 7–22)
CALCIUM SERPL-MCNC: 9.6 MG/DL (ref 8.5–10.5)
CHLORIDE BLD-SCNC: 93 MEQ/L (ref 98–111)
CO2: 27 MEQ/L (ref 23–33)
CREAT SERPL-MCNC: 0.7 MG/DL (ref 0.4–1.2)
GFR SERPL CREATININE-BSD FRML MDRD: 78 ML/MIN/1.73M2
GLUCOSE BLD-MCNC: 189 MG/DL (ref 70–108)
POTASSIUM SERPL-SCNC: 4.8 MEQ/L (ref 3.5–5.2)
SODIUM BLD-SCNC: 132 MEQ/L (ref 135–145)

## 2022-10-10 PROCEDURE — 1111F DSCHRG MED/CURRENT MED MERGE: CPT | Performed by: FAMILY MEDICINE

## 2022-10-10 NOTE — CARE COORDINATION
[facility] Care Transitions Initial Follow Up Call    Call within 2 business days of discharge: Yes    Care Transition Nurse contacted Patient/Sonby telephone to perform post hospital discharge assessment. Verified name and  with patient as identifiers. Provided introduction to self, and explanation of the Care Transition Nurse role. Patient: Ayesha Talbert Patient : 1926   MRN: [id number]  Reason for Admission: Dehydration, Ac Met Enceph, UTI  Discharge Date: 10/7/22 RARS: Readmission Risk Score: 15.1    Last Discharge  Street       Date Complaint Diagnosis Description Type Department Provider    10/3/22 Fatigue; Urinary Tract Infection Hyponatremia . .. ED to Hosp-Admission (Discharged) (ADMITTED) Grabiel Oconnell MD; Tiny Wolfe. .. Was this an external facility discharge? No Discharge Facility: [address] [place] [address],[address] to be reviewed by the provider   Additional needs identified to be addressed with provider: denies     Method of communication with provider: none. Phone Assessment: Patient and Son report Patient doing well since home. Patient denies fever, chills, urinary complaints, gen weakness, malaise. Patient noted to be a&o, answering questions appropriately. Per Son--Patient at baseline. Reports voiding qs, clear yellow urine; denies urinary complaints. UTI Zone Mgt education revirewd, Son verbalizes understanding. + bm. Appetite and fluid intake at baseline. Patient consumes SF Glucerna x 2 per day.  this morning. Reviewed DM Zone Mgt education--Son verbalizes understanding. Politely declines RD referral. Son monitors vs and keep log--/70, HR 85. Patient and Son live togeter; Son assists Patient w/ adls as needed. Patient uses walker, w/c, grab bars, bath seat. Denies need for additional dme, community resources. Has not yet been contacted by [facility]; CTN to f/u. Discussed benefits of HHC including 24/7 RN coverage in effort to avoid hospitalization.      Care Transition Nurse reviewed discharge instructions, medical action plan, and red flags with patient /son who verbalized understanding. Son was given an opportunity to ask questions and does not have any further questions or concerns at this time. Were discharge instructions available to patient? Yes. Reviewed appropriate site of care based on symptoms and resources available to patient including:   PCP  Urgent care clinics  Home health. Son agrees to contact the PCP office for questions related to Patient's healthcare. Medication reconciliation was performed with  Son , who verbalizes understanding of administration of home medications. Medications reviewed, 1111F entered: yes. Reports Patient taking all rx as directed, denies rx needs. Was patient discharged with a pulse oximeter? N/A    Non-face-to-face services provided:  Education of patient/family/caregiver/guardian to support self-management-UTI    Offered patient enrollment in the Remote Patient Monitoring (RPM) program for in-home monitoring: NA.    Care Transitions 24 Hour Call    Schedule Follow Up Appointment with PCP: Declined  Do you have a copy of your discharge instructions?: Yes  Do you have all of your prescriptions and are they filled?: Yes  Have you been contacted by a [facility] Pharmacist?: No  Have you scheduled your follow up appointment?: Yes  How are you going to get to your appointment?: Car - family or friend to transport  Do you have support at home?: Child  Do you feel like you have everything you need to keep you well at home?: Yes  Are you an active caregiver in your home?: No  Care Transitions Interventions  Disease Association: Completed             Son to provide transportation for following upcoming PCP appt:   Future Appointments   Date Time Provider Heriberto Bynum   10/12/2022  9:45 AM Maggy Kumar MD [facility]   12/15/2022  9:30 AM SCHEDULE, SPENCER Pack Planning Reports Advanced Directives up to date, reflecting current wishes . Encouraged to place on file w/ PCP, [facility]. Healthcare Decision Maker:    Primary Decision Maker: Adalberto Pratt - Child - [phone]    Care Transition Nurse provided contact information. Plan for follow-up call in 3-5 days based on severity of symptoms and risk factors. Plan for next call:  sx- uti/encph/dehydration?,  dm/uti ed,   f/u on PCP appt, confirm [facility] intake visit. T/C [facility]. Notified of 10/7/22 discharge and that Patient has not yet been contacted for intake visit.  Ivett Gray reports agency was not aware of discharge and to f/u w/ Patient edgar Maldonado RN

## 2022-10-11 LAB — PROTEIN ELECTROPHORESIS, SERUM: NORMAL

## 2022-10-11 ASSESSMENT — ENCOUNTER SYMPTOMS
TROUBLE SWALLOWING: 0
BACK PAIN: 0
CHEST TIGHTNESS: 0
VOICE CHANGE: 0
VOMITING: 0
DIARRHEA: 0
SHORTNESS OF BREATH: 0
NAUSEA: 0
BLOOD IN STOOL: 0
ABDOMINAL PAIN: 0
COUGH: 0

## 2022-10-11 NOTE — ED PROVIDER NOTES
[facility] MED SURG 8B    EMERGENCY MEDICINE     Pt Name: Cici Pearce  MRN: [id number]  [place] 6/6/1926  Date of evaluation: 10/3/2022  Provider: Kate Hauser DO, [address]       Chief Complaint   Patient presents with    Fatigue    Urinary Tract Infection       HISTORY OF PRESENT ILLNESS    Cici Pearce is a pleasant 80 y.o. female   Presents to the emergency department from home   Generalized weakness, fatigue that was noted today  Pt has been drowsy today; at some point was noted by her family to be clammy  A Blood pressure was checked at home and read \"low\" and therefore EMS was called to bring pt to hospital.  She was discharged 24hrs ago after being admitted for a UTI (gram neg bacilli) currently on Cefdinir. Pt denies any complaints. Denies anyurinary symptoms  No chest pain or shortness of breath      Triage notes and Nursing notes were reviewed by myself. Any discrepancies are addressed above.     PAST MEDICAL HISTORY     Past Medical History:   Diagnosis Date    Broken leg 1982    Lt    Controlled type 2 diabetes mellitus with microalbuminuria, without long-term current use of insulin ([address]     Hyperlipidemia     Persistent proteinuria associated with type 2 diabetes mellitus ([address]     Varicose veins        SURGICAL HISTORY       Past Surgical History:   Procedure Laterality Date    EYE SURGERY         CURRENT MEDICATIONS       Discharge Medication List as of 10/7/2022 11:47 AM        CONTINUE these medications which have NOT CHANGED    Details   metFORMIN (GLUCOPHAGE-XR) 500 MG extended release tablet Take 3 tabs with supper, Disp-270 tablet, R-3Take in the am.Normal      pravastatin (PRAVACHOL) 40 MG tablet Take 1 tablet by mouth every evening, Disp-90 tablet, R-3Normal      blood glucose test strips (FREESTYLE LITE) strip 1 each by In Vitro route daily Dx: E11.29, check BS daily, Disp-100 each, R-3Normal      FreeStyle Lancets MISC DAILY Starting Wed 6/15/2022, Disp-100 each, R-3, NormalDx: E11.29, check bs daily      Misc. Devices [facility]) MISC Disp-1 each, R-0, QteuhA34.4, R53.1, H54.10      aspirin EC 81 MG EC tablet Take 81 mg by mouth dailyHistorical Med      multivitamin (THERAGRAN) per tablet Take 1 tablet by mouth daily. Naproxen Sodium (ALEVE PO) Take  by mouth daily as needed. ALLERGIES     Pcn [penicillins]    FAMILY HISTORY       Family History   Problem Relation Age of Onset    Stroke Mother         SOCIAL HISTORY       Social History     Socioeconomic History    Marital status:      Spouse name: None    Number of children: None    Years of education: None    Highest education level: None   Tobacco Use    Smoking status: Never    Smokeless tobacco: Never   Vaping Use    Vaping Use: Never used   Substance and Sexual Activity    Alcohol use: No    Drug use: No     Social Determinants of Health     Financial Resource Strain: Low Risk     Difficulty of Paying Living Expenses: Not hard at all   Food Insecurity: No Food Insecurity    Worried About Running Out of Food in the Last Year: Never true    Ran Out of Food in the Last Year: Never true   Physical Activity: Unknown    Days of Exercise per Week: 0 days       REVIEW OF SYSTEMS     Review of Systems   Constitutional:  Positive for fatigue. Negative for chills, diaphoresis and fever. HENT:  Negative for trouble swallowing and voice change. Eyes:  Negative for visual disturbance. Respiratory:  Negative for cough, chest tightness and shortness of breath. Cardiovascular:  Negative for chest pain and leg swelling. Gastrointestinal:  Negative for abdominal pain, blood in stool, diarrhea, nausea and vomiting. Genitourinary:  Negative for dysuria, frequency and hematuria. Musculoskeletal:  Negative for back pain and neck pain. Skin:  Negative for rash and wound. Neurological:  Positive for weakness. Negative for speech difficulty, numbness and headaches.    Psychiatric/Behavioral: Negative for confusion. Except as noted above the remainder of the review of systems was reviewed and is. PHYSICAL EXAM    (up to 7 for level 4, 8 or more for level 5)     ED Triage Vitals   BP Temp Temp Source Heart Rate Resp SpO2 Height Weight   10/03/22 1129 10/03/22 1129 10/03/22 1129 10/03/22 1129 10/03/22 1129 10/03/22 1129 10/03/22 1508 10/03/22 1129   117/63 98.7 °F (37.1 °C) Oral 92 17 96 % 4' 11\" (1.499 m) 96 lb (43.5 kg)       Physical Exam  Vitals and nursing note reviewed. Constitutional:       General: She is not in acute distress. Appearance: She is well-developed. She is not ill-appearing, toxic-appearing or diaphoretic. Comments: Pleasant elderly lady in no acute distress   HENT:      Head: Normocephalic and atraumatic. Eyes:      General: No scleral icterus. Conjunctiva/sclera: Conjunctivae normal.      Right eye: Right conjunctiva is not injected. Left eye: Left conjunctiva is not injected. Pupils: Pupils are equal, round, and reactive to light. Neck:      Thyroid: No thyromegaly. Trachea: No tracheal deviation. Cardiovascular:      Rate and Rhythm: Normal rate and regular rhythm. Heart sounds: Normal heart sounds. No murmur heard. No friction rub. No gallop. Pulmonary:      Effort: Pulmonary effort is normal. No respiratory distress. Breath sounds: Normal breath sounds. No stridor. No wheezing or rales. Abdominal:      General: Bowel sounds are normal. There is no distension. Palpations: Abdomen is soft. There is no mass. Tenderness: There is no abdominal tenderness. There is no guarding or rebound. Comments: Negative Macdonald's sign  Nontender McBurney's Point  Negative Rovsig's sign  No bruising or echymosis of abdomen   Musculoskeletal:         General: No tenderness. Cervical back: Normal range of motion and neck supple.       Comments: Negative Nagi's Sign bilaterally   Lymphadenopathy:      Cervical: No cervical adenopathy. Skin:     General: Skin is warm and dry. Coloration: Skin is not pale. Findings: No erythema or rash. Neurological:      Mental Status: She is alert and oriented to person, place, and time. Cranial Nerves: No cranial nerve deficit. Motor: No abnormal muscle tone. Coordination: Coordination normal.      Comments: No nystagmus   Psychiatric:         Behavior: Behavior normal.         Thought Content:  Thought content normal.       DIAGNOSTIC RESULTS     EKG:(none if blank)  All EKG's are interpreted by [facility] Physician who either signs or Co-signs this chart in the absence of a cardiologist.    No ischemia    RADIOLOGY: (none if blank)   Interpretation per the Radiologistbelow, if available at the time of this note:    No orders to display       LABS:  Labs Reviewed   CBC WITH AUTO DIFFERENTIAL - Abnormal; Notable for the following components:       Result Value    WBC 12.6 (*)     RBC 3.39 (*)     Hemoglobin 10.7 (*)     Hematocrit 32.1 (*)     Segs Absolute 10.3 (*)     Lymphocytes Absolute 0.9 (*)     All other components within normal limits   COMPREHENSIVE METABOLIC PANEL - Abnormal; Notable for the following components:    Glucose 210 (*)     Sodium 126 (*)     Chloride 90 (*)     Albumin 3.3 (*)     All other components within normal limits   TROPONIN - Abnormal; Notable for the following components:    Troponin T 0.054 (*)     All other components within normal limits   OSMOLALITY - Abnormal; Notable for the following components:    Osmolality Calc 262.2 (*)     All other components within normal limits   SODIUM - Abnormal; Notable for the following components:    Sodium 125 (*)     All other components within normal limits   BASIC METABOLIC PANEL - Abnormal; Notable for the following components:    Sodium 127 (*)     Chloride 92 (*)     Glucose 181 (*)     All other components within normal limits   CBC - Abnormal; Notable for the following components: WBC 11.1 (*)     RBC 3.01 (*)     Hemoglobin 9.5 (*)     Hematocrit 28.0 (*)     All other components within normal limits   SODIUM - Abnormal; Notable for the following components:    Sodium 123 (*)     All other components within normal limits   SODIUM - Abnormal; Notable for the following components:    Sodium 128 (*)     All other components within normal limits   CBC - Abnormal; Notable for the following components:    RBC 2.74 (*)     Hemoglobin 8.8 (*)     Hematocrit 25.4 (*)     All other components within normal limits   BASIC METABOLIC PANEL - Abnormal; Notable for the following components:    Sodium 132 (*)     Chloride 96 (*)     Glucose 130 (*)     All other components within normal limits   SODIUM - Abnormal; Notable for the following components:    Sodium 123 (*)     All other components within normal limits   IRON - Abnormal; Notable for the following components:    Iron 25 (*)     All other components within normal limits   TRANSFERRIN - Abnormal; Notable for the following components:    Transferrin 157 (*)     All other components within normal limits   SODIUM - Abnormal; Notable for the following components:    Sodium 127 (*)     All other components within normal limits   SODIUM - Abnormal; Notable for the following components:    Sodium 124 (*)     All other components within normal limits   BASIC METABOLIC PANEL - Abnormal; Notable for the following components:    Sodium 130 (*)     Chloride 95 (*)     Glucose 140 (*)     BUN 44 (*)     All other components within normal limits   CBC - Abnormal; Notable for the following components:    RBC 2.82 (*)     Hemoglobin 8.9 (*)     Hematocrit 26.8 (*)     All other components within normal limits   SODIUM - Abnormal; Notable for the following components:    Sodium 126 (*)     All other components within normal limits   GLOMERULAR FILTRATION RATE, ESTIMATED - Abnormal; Notable for the following components:    Est, Glom Filt Rate 78 (*)     All other components within normal limits   SODIUM - Abnormal; Notable for the following components:    Sodium 128 (*)     All other components within normal limits   SODIUM - Abnormal; Notable for the following components:    Sodium 129 (*)     All other components within normal limits   BASIC METABOLIC PANEL - Abnormal; Notable for the following components:    Sodium 131 (*)     Chloride 96 (*)     Glucose 143 (*)     BUN 37 (*)     All other components within normal limits   CBC - Abnormal; Notable for the following components:    RBC 2.82 (*)     Hemoglobin 9.0 (*)     Hematocrit 26.3 (*)     All other components within normal limits   SODIUM - Abnormal; Notable for the following components:    Sodium 128 (*)     All other components within normal limits   POCT GLUCOSE - Abnormal; Notable for the following components:    POC Glucose 246 (*)     All other components within normal limits   POCT GLUCOSE - Abnormal; Notable for the following components:    POC Glucose 303 (*)     All other components within normal limits   POCT GLUCOSE - Abnormal; Notable for the following components:    POC Glucose 191 (*)     All other components within normal limits   POCT GLUCOSE - Abnormal; Notable for the following components:    POC Glucose 311 (*)     All other components within normal limits   POCT GLUCOSE - Abnormal; Notable for the following components:    POC Glucose 320 (*)     All other components within normal limits   POCT GLUCOSE - Abnormal; Notable for the following components:    POC Glucose 245 (*)     All other components within normal limits   POCT GLUCOSE - Abnormal; Notable for the following components:    POC Glucose 351 (*)     All other components within normal limits   POCT GLUCOSE - Abnormal; Notable for the following components:    POC Glucose 301 (*)     All other components within normal limits   POCT GLUCOSE - Abnormal; Notable for the following components:    POC Glucose 136 (*)     All other components within normal limits   POCT GLUCOSE - Abnormal; Notable for the following components:    POC Glucose 324 (*)     All other components within normal limits   POCT GLUCOSE - Abnormal; Notable for the following components:    POC Glucose 347 (*)     All other components within normal limits   POCT GLUCOSE - Abnormal; Notable for the following components:    POC Glucose 266 (*)     All other components within normal limits   POCT GLUCOSE - Abnormal; Notable for the following components:    POC Glucose 306 (*)     All other components within normal limits   POCT GLUCOSE - Abnormal; Notable for the following components:    POC Glucose 325 (*)     All other components within normal limits   POCT GLUCOSE - Abnormal; Notable for the following components:    POC Glucose 114 (*)     All other components within normal limits   POCT GLUCOSE - Abnormal; Notable for the following components:    POC Glucose 154 (*)     All other components within normal limits   POCT GLUCOSE - Abnormal; Notable for the following components:    POC Glucose 327 (*)     All other components within normal limits   POCT GLUCOSE - Abnormal; Notable for the following components:    POC Glucose 208 (*)     All other components within normal limits   POCT GLUCOSE - Abnormal; Notable for the following components:    POC Glucose 241 (*)     All other components within normal limits   POCT GLUCOSE - Abnormal; Notable for the following components:    POC Glucose 146 (*)     All other components within normal limits   COVID-19, RAPID   ANION GAP   GLOMERULAR FILTRATION RATE, ESTIMATED   MAGNESIUM   ANION GAP   GLOMERULAR FILTRATION RATE, ESTIMATED   TSH WITH REFLEX   SODIUM, URINE, RANDOM   OSMOLALITY, URINE   SPECIMEN REJECTION   OSMOLALITY, SERUM   OSMOLALITY, SERUM   ANION GAP   GLOMERULAR FILTRATION RATE, ESTIMATED   FERRITIN   CORTISOL TOTAL   SODIUM, URINE, RANDOM   OSMOLALITY, URINE   OSMOLALITY, SERUM   ANION GAP   CORTISOL TOTAL   ELECTROPHORESIS PROTEIN, SERUM   ANION GAP   GLOMERULAR FILTRATION RATE, ESTIMATED   GLOBULIN   POCT GLUCOSE   POCT GLUCOSE   POCT GLUCOSE   POCT GLUCOSE   POCT GLUCOSE       All other labs were within normal range or not returned as of this dictation. Please note, any cultures that may have been sent were not resulted at the time of this patient visit. EMERGENCY DEPARTMENT COURSE andMedical Decision Making:     MDM  /   Hyponatremia: suspect hypovolemic; combined together with orthostatic symptoms likely poor fluid intake. IV fluid bolus given (500ml)    Recent UTI: currently asymptomatic    CKD    Elevated cardiac markers: no anginal symptoms and no ischemic changes on presentation    Plan admit to hospital for rehydration and monitoring sodium closely        Procedures: (None if blank)         CLINICAL IMPRESSION     1. Hyponatremia    2.  Near syncope          DISPOSITION/PLAN   DISPOSITION Admitted 10/03/2022 02:03:41 PM      PATIENT REFERRED TO:      DISCHARGE MEDICATIONS:            (Please note that portions of this note were completed with a voice recognition program.  Efforts were made to edit the dictations but occasionallywords are mis-transcribed.)      Janis Alfred DO,FACEP (electronically signed)  Attending Physician, Emergency [address], DO  10/11/22 0560

## 2022-10-12 ENCOUNTER — OFFICE VISIT (OUTPATIENT)
Dept: FAMILY MEDICINE CLINIC | Age: 87
End: 2022-10-12

## 2022-10-12 VITALS
HEART RATE: 86 BPM | DIASTOLIC BLOOD PRESSURE: 72 MMHG | TEMPERATURE: 97.8 F | RESPIRATION RATE: 16 BRPM | SYSTOLIC BLOOD PRESSURE: 124 MMHG | OXYGEN SATURATION: 98 %

## 2022-10-12 DIAGNOSIS — E87.1 HYPONATREMIA: ICD-10-CM

## 2022-10-12 DIAGNOSIS — Z09 HOSPITAL DISCHARGE FOLLOW-UP: ICD-10-CM

## 2022-10-12 DIAGNOSIS — R40.4 TRANSIENT ALTERATION OF AWARENESS: Primary | ICD-10-CM

## 2022-10-12 DIAGNOSIS — R80.9 PERSISTENT PROTEINURIA ASSOCIATED WITH TYPE 2 DIABETES MELLITUS (HCC): ICD-10-CM

## 2022-10-12 DIAGNOSIS — E11.29 PERSISTENT PROTEINURIA ASSOCIATED WITH TYPE 2 DIABETES MELLITUS (HCC): ICD-10-CM

## 2022-10-12 DIAGNOSIS — E11.29 TYPE 2 DIABETES MELLITUS WITH MICROALBUMINURIA, WITHOUT LONG-TERM CURRENT USE OF INSULIN (HCC): ICD-10-CM

## 2022-10-12 DIAGNOSIS — R80.9 TYPE 2 DIABETES MELLITUS WITH MICROALBUMINURIA, WITHOUT LONG-TERM CURRENT USE OF INSULIN (HCC): ICD-10-CM

## 2022-10-12 RX ORDER — LISINOPRIL 10 MG/1
10 TABLET ORAL DAILY
Qty: 90 TABLET | Refills: 2 | Status: SHIPPED | OUTPATIENT
Start: 2022-10-12

## 2022-10-12 ASSESSMENT — ENCOUNTER SYMPTOMS
COUGH: 0
BLOOD IN STOOL: 0
SORE THROAT: 0
WHEEZING: 0
NAUSEA: 0
RHINORRHEA: 0
CONSTIPATION: 0
CHEST TIGHTNESS: 0
VOMITING: 0
ABDOMINAL PAIN: 0
EYE PAIN: 0
BACK PAIN: 0
SHORTNESS OF BREATH: 0
DIARRHEA: 0

## 2022-10-12 NOTE — PROGRESS NOTES
Post-Discharge Transitional Care Follow Up      Taina Pittman   YOB: 1926    Date of Office Visit:  10/12/2022  Date of Hospital Admission: 10/3/22  Date of Hospital Discharge: 10/7/22  Readmission Risk Score (high >=14%. Medium >=10%):Readmission Risk Score: 15.1      Care management risk score Rising risk (score 2-5) and Complex Care (Scores >=6): No Risk Score On File     Non face to face  following discharge, date last encounter closed (first attempt may have been earlier): 10/10/2022     Call initiated 2 business days of discharge: Yes     Transient alteration of awareness  -improved as sodium level increased  Hyponatremia  -lisinopril adjusted, getting VN services  Type 2 diabetes mellitus with microalbuminuria, without long-term current use of insulin ([address]  -chronic condition, exacerbated, stopped amaryl, just using metformin,   Hemoglobin A1C   Date Value Ref Range Status   06/15/2022 7.1 (H) 4.4 - 6.4 % Final       Hospital discharge follow-up  -     MT DISCHARGE MEDS RECONCILED W/ CURRENT OUTPATIENT MED LIST  Persistent proteinuria associated with type 2 diabetes mellitus (HCC)  -stable, controlled on lisinopril  -     lisinopril (PRINIVIL;ZESTRIL) 10 MG tablet; Take 1 tablet by mouth daily, Disp-90 tablet, R-2Normal    Medical Decision Making: moderate complexity  Return if symptoms worsen or fail to improve. Subjective:   HPI    Presented to [facility] with confusion    Inpatient course: Discharge summary reviewed- see chart. Interval history/Current status: Diagnosed with hyponatremia, meds adjusted, stopped amaryl and lowered lisinopril. Is acting more normal since home. Having visiting nurse services. , non smoker, pmh reviewed.        Patient Active Problem List   Diagnosis    HTN (hypertension)    Pure hypercholesterolemia    Proteinuria    Type 2 diabetes mellitus with microalbuminuria ([address]    Hypercholesteremia    Persistent proteinuria associated with type 2 diabetes mellitus ([facility]    Compression fracture of lumbar vertebra ([facility]    Breast mass    AMS (altered mental status)    Acute metabolic encephalopathy    Hyponatremia    Near syncope    Severe malnutrition ([facility]       Medications listed as ordered at the time of discharge from hospital     Medication List            Accurate as of October 12, 2022 10:03 AM. If you have any questions, ask your nurse or doctor.                 CONTINUE taking these medications      ALEVE PO     aspirin EC 81 MG EC tablet     ferrous sulfate 325 (65 Fe) MG tablet  Commonly known as: IRON 325  Take 1 tablet by mouth every 48 hours     FreeStyle Lancets Misc  1 each by Does not apply route daily Dx: E11.29, check bs daily     FREESTYLE LITE strip  Generic drug: blood glucose test strips  1 each by In Vitro route daily Dx: E11.29, check BS daily     lisinopril 10 MG tablet  Commonly known as: PRINIVIL;ZESTRIL  Take 1 tablet by mouth daily     metFORMIN 500 MG extended release tablet  Commonly known as: GLUCOPHAGE-XR  Take 3 tabs with supper     multivitamin per tablet     pravastatin 40 MG tablet  Commonly known as: Pravachol  Take 1 tablet by mouth every evening     Wheelchair Misc  R40.4, R53.1, H54.10               Where to Get Your Medications        These medications were sent to [address] [place] [address]      Phone: [phone]   lisinopril 10 MG tablet          Medications marked \"taking\" at this time  Outpatient Medications Marked as Taking for the 10/12/22 encounter (Office Visit) with Stoney Powell MD   Medication Sig Dispense Refill    lisinopril (PRINIVIL;ZESTRIL) 10 MG tablet Take 1 tablet by mouth daily 90 tablet 2    ferrous sulfate (IRON 325) 325 (65 Fe) MG tablet Take 1 tablet by mouth every 48 hours 30 tablet 3    metFORMIN (GLUCOPHAGE-XR) 500 MG extended release tablet Take 3 tabs with supper 270 tablet 3    pravastatin (PRAVACHOL) 40 MG tablet Take 1 tablet by mouth every evening 90 tablet 3    blood glucose test strips (FREESTYLE LITE) strip 1 each by In Vitro route daily Dx: E11.29, check BS daily 100 each 3    FreeStyle Lancets MISC 1 each by Does not apply route daily Dx: E11.29, check bs daily 100 each 3    Misc. Devices ([address]) MISC R40.4, R53.1, H54.10 1 each 0    aspirin EC 81 MG EC tablet Take 81 mg by mouth daily      multivitamin (THERAGRAN) per tablet Take 1 tablet by mouth daily. Naproxen Sodium (ALEVE PO) Take  by mouth daily as needed. Medications patient taking as of now reconciled against medications ordered at time of hospital discharge: Yes    Review of Systems   Constitutional:  Negative for chills, fatigue, fever and unexpected weight change. HENT:  Negative for congestion, ear pain, rhinorrhea and sore throat. Eyes:  Negative for pain and visual disturbance. Respiratory:  Negative for cough, chest tightness, shortness of breath and wheezing. Cardiovascular:  Negative for chest pain and palpitations. Gastrointestinal:  Negative for abdominal pain, blood in stool, constipation, diarrhea, nausea and vomiting. Genitourinary:  Negative for difficulty urinating, frequency, hematuria and urgency. Musculoskeletal:  Negative for back pain, joint swelling, myalgias and neck pain. Skin:  Negative for rash. Neurological:  Negative for dizziness and headaches. Hematological:  Negative for adenopathy. Does not bruise/bleed easily. Psychiatric/Behavioral:  Positive for confusion. Negative for behavioral problems and sleep disturbance. The patient is not nervous/anxious. Objective:    /72   Pulse 86   Temp 97.8 °F (36.6 °C) (Infrared)   Resp 16   SpO2 98%   Physical Exam    An electronic signature was used to authenticate this note.   --Deep Avilez MD

## 2022-10-13 ENCOUNTER — CARE COORDINATION (OUTPATIENT)
Dept: CASE MANAGEMENT | Age: 87
End: 2022-10-13

## 2022-10-13 NOTE — CARE COORDINATION
[facility] Care Transitions Follow Up Call    Care Transition Nurse contacted Patient's Son by telephone to follow up after [facility] Verified name and  with  Son  as identifiers. Patient: Brigitte Caraballo  Patient : 1926   MRN: [id number]  Reason for Admission: Dehydration, Ac Met Enceph, UTI  Discharge Date: 10/7/22 RARS: Readmission Risk Score: 15.1    Needs to be reviewed by the provider   Additional needs identified to be addressed with provider: denies     Method of communication with provider:  n/a . Phone Assessment: Son reports Patient doing very well. Was seen by PCP today--no new rx. Reports appetite, fluid intake good; Patient drinking [employer] Electrolyte Water. 10/10/22 Na 132 which up from Na 128 107/. Denies fatigue, weakness, muscle spasms, n/v. Reports voiding qs w/ light yellow urine; no urinary complaints. Reviewed UTI zone mgt education, verbalizes understanding.  this morning. Reviewed DM zone mgt education. Reports PCP adjusting time Patient takes Metformin. Reports Patient adheres to low carb diet most days. Last A1C 7.2022 w/ plan for next check in this Decemeber. Patient was seen by [facility] for intake visit; decision was made for SN services only. Son denies need for hha, community support, dme. Son care for Patient, assists w/ adls and manages Patient healthcare. Future Appointments   Date Time Provider Heriberto Bynum   12/15/2022  9:30 AM SCHEDULE, SPENCER NELSON [facility] Transition Nurse reviewed medical action plan and red flags with  Son  and discussed any barriers to care and/or understanding of plan of care after discharge. Discussed appropriate site of care based on symptoms and resources available to patient including: PCP  Specialist  Urgent care clinics. Son agrees to contact PCP office for questions related to Patient's healthcare.      Patients top risk factors for readmission: medical condition-Recent UTI, DM  Interventions to address risk factors: Education of patient/family/caregiver/guardian to support self-management-. Offered patient enrollment in the Remote Patient Monitoring (RPM) program for in-home monitoring: NA.     Care Transitions Subsequent and Final Call    Schedule Follow Up Appointment with PCP: Declined  Subsequent and Final Calls  Do you have any ongoing symptoms?: No  Have your medications changed?: No  Do you have any questions related to your medications?: No  Do you currently have any active services?: Yes  Are you currently active with any services?: Home Health  Do you have any needs or concerns that I can assist you with?: No  Identified Barriers: Other  Care Transitions Interventions  Disease Association: Completed      Other Interventions:             Care Transition Nurse provided contact information for future needs. Plan for follow-up call in 5-7 days based on severity of symptoms and risk factors.   Plan for next call:  ursula apyton RN

## 2022-10-21 ENCOUNTER — CARE COORDINATION (OUTPATIENT)
Dept: CASE MANAGEMENT | Age: 87
End: 2022-10-21

## 2022-10-21 NOTE — CARE COORDINATION
[facility] Care Transitions Follow Up Call    Care Transition Nurse contacted Son/+HIPAA by telephone to follow up after [facility]. Verified name and  as identifiers. Patient: Taina Pittman  Patient : 1926   MRN: [id number]  Reason for Admission:  Dehydration, Ac Met Enceph, UTI  Discharge Date: 10/7/22 RARS: Readmission Risk Score: 15.1      Needs to be reviewed by the provider   Additional needs identified to be addressed with provider:  no     Method of communication with provider: none. Phone Assessment: Son provided the following Patient information. Patient doing \"really good\". Confusion resolved, back to baseline. No urinary complaints, no pain,fever or chills. Voiding qs w/ very light yellow urine. Denies fatigue, weakness, muscle spasms, n/v r/t hyponatremia. Drinking Smart Water w/ electrolytes and SF Glucerna 2 x day. Appetite, fluid intake good w/ b&b wnl. -175. Adheres to low carb diet most days. Reviewed DM Zone Mgt education, verbalizes understanding. Declines RD referral. Active w/ [facility] for SN. Denies need for additional in home support, dme, community assistance. Son lives w/ Patient and provides care. Addressed changes since last contact:  none    Discussed follow-up appointments. 10/13/22Dr Padilla--attended appt  Future Appointments   Date Time Provider Heriberto Bynum   12/15/2022  9:30 AM SCHEDULE, SPENCER NELSON [address] Transition Nurse reviewed medical action plan and red flags with  Son  and discussed any barriers to care and/or understanding of plan of care after discharge. Discussed appropriate site of care based on symptoms and resources available to patient including: PCP  Specialist  Urgent care clinics. Agrees to contact the PCP office for questions related to Patient healthcare. Advance Care Planning:   Previously addressed .      Patients top risk factors for readmission: -Recent UTI, DM  Interventions to address risk factors: Education of patient/family/caregiver/guardian to support self-management-. Offered patient enrollment in the Remote Patient Monitoring (RPM) program for in-home monitoring: NA.     Care Transitions Subsequent and Final Call    Schedule Follow Up Appointment with PCP: Declined  Subsequent and Final Calls  Do you have any ongoing symptoms?: No  Have your medications changed?: No  Do you have any questions related to your medications?: No  Do you currently have any active services?: Yes  Are you currently active with any services?: Home Health  Do you have any needs or concerns that I can assist you with?: No  Identified Barriers: Other  Care Transitions Interventions   Home Care Waiver: Declined        Transportation Support: Declined    Meals on Wheels: Declined  DME Assistance: Declined     Senior Services: Declined      Social Work: Declined    Disease Association: Completed      Other Interventions:             Care Transition Nurse provided contact information for future needs. Plan for follow-up call in 5-7 days based on severity of symptoms and risk factors.   Plan for next call:  inderjit payton RN

## 2022-10-28 ENCOUNTER — CARE COORDINATION (OUTPATIENT)
Dept: CASE MANAGEMENT | Age: 87
End: 2022-10-28

## 2022-10-28 NOTE — CARE COORDINATION
Care Transitions Outreach Attempt    Patient: Shantelle Nagel Patient : 1926 MRN: [id number] Reason for Admission: Dehydration, Ac Met Enceph, UTI  Discharge Date: 10/7/22       RARS: Readmission Risk Score: 15.1  Facility: [facility] 10/3/22-10/7/22   Last Discharge [address]       Date Complaint Diagnosis Description Type Department Provider    10/3/22 Fatigue; Urinary Tract Infection Hyponatremia . .. ED to Hosp-Admission (Discharged) (ADMITTED) Mine Francis MD; Collette Bacca. .. Noted following upcoming appointments from discharge chart review:   [facility] follow up appointment(s):   Future Appointments   Date Time Provider Heriberto Bynum   12/15/2022  9:30 AM SCHEDULE, SPENCER Cunningham [facility] - [address]     Attempt to reach for Care Transition follow up call unsuccessful. HIPAA compliant message left requesting call back.    [address] [place] [address] [phone] The patient is a 89y Female complaining of nausea.

## 2022-11-01 ENCOUNTER — CARE COORDINATION (OUTPATIENT)
Dept: CASE MANAGEMENT | Age: 87
End: 2022-11-01

## 2022-11-01 NOTE — CARE COORDINATION
[facility] Care Transitions Follow Up Call    Care Transition Nurse contacted  Jeffrey Warren  by telephone to follow up after admission on  10/3/22-10/7/22 . Verified Patient name and  with  Son  as identifiers. Patient: Cecilia Campbell  Patient : 1926   MRN: [id number]  Reason for Admission:   Dehydration, Ac Met Enceph, UTI  Discharge Date: 10/7/22 RARS: Readmission Risk Score: 15.1  Facility: [facility]    Needs to be reviewed by the provider   Additional needs identified to be addressed with provider:   denies     Method of communication with provider: none. Phone Assessment. Son provided the following Patient information. Reports Patient doing \"pretty good\". Confusion at baseline. Voiding qs, + bm. No urinary complaints, concerns. Appetite, fluid intake at baseline. Drinking Smart Water w/ electrolytes and SF Glucerna 2 x day.  this morning. Reports Patient adherence to low carb diet most days. Reports taking meds as directed. Reviewed uti/dm zone mgt education. Was seen today by [facility] for skilled nursing visit. Denies need for additional in home support, dme, community assistance. Son lives w/ Patient and provides care. Discussed follow-up appointments. 10/13/22Dr Padilla--attended 7 day HFU appt  Future Appointments   Date Time Provider Heriberto Bynum   12/15/2022  9:30 AM SCHEDULE, [facility]  [address] [facility] Transition Nurse reviewed medical action plan and red flags with  Son  and discussed any barriers to care and/or understanding of plan of care after discharge. Discussed appropriate site of care based on symptoms and resources available to patient including: PCP  Specialist  Urgent care clinics. Agrees to contact the PCP office for questions related to Patient healthcare.      Patients top risk factors for readmission: medical condition-Recent UTI, DM , Advanced Age  Interventions to address risk factors: Education of patient/family/caregiver/guardian to support self-management-as above    Offered patient enrollment in the Remote Patient Monitoring (RPM) program for in-home monitoring: NA.     Care Transitions Subsequent and Final Call    Schedule Follow Up Appointment with PCP: Declined  Subsequent and Final Calls  Do you have any ongoing symptoms?: No  Have your medications changed?: No  Do you have any questions related to your medications?: No  Do you currently have any active services?: Yes  Are you currently active with any services?: Home Health  Do you have any needs or concerns that I can assist you with?: No  Identified Barriers: Other  Care Transitions Interventions   Home Care Waiver: Declined        Transportation Support: Declined    Meals on Wheels: Declined  DME Assistance: Declined     Senior Services: Declined      Social Work: Declined    Disease Association: Completed      Other Interventions:             Care Transition Nurse provided contact information for future needs. Plan for follow-up call in 7-10 days based on severity of symptoms and risk factors.   Plan for next call:  final call if well and no needs    Michelle Diaz RN

## 2022-11-09 ENCOUNTER — CARE COORDINATION (OUTPATIENT)
Dept: CASE MANAGEMENT | Age: 87
End: 2022-11-09

## 2022-11-09 NOTE — CARE COORDINATION
[facility] Care Transitions Follow Up Call    Care Transition Nurse contacted the family and Gaye Bowman, son with whom she lives  by telephone to follow up after admission on 10/3/22. Verified name and  with family as identifiers. Patient: Esthela Nichole  Patient : 1926   MRN: <[id number]>  Reason for Admission: Hyponatremia, near syncope, UTI, DM  Discharge Date: 10/7/22 RARS: Readmission Risk Score: 15.1      Needs to be reviewed by the provider   Additional needs identified to be addressed with provider: No  none             Method of communication with provider: none. Sharath answers the phone stating that Skylar Phoenix is doing, \"really good\". Urine is clear, no odor or abdominal pain. Drinking plenty of water. BS this am:  130. [facility] will be discharging next week. Addressed changes since last contact:  none  Doing well  Discussed follow-up appointments. If no appointment was previously scheduled, appointment scheduling offered: No.   Is follow up appointment scheduled within 7 days of discharge? Yes. Follow Up  Future Appointments   Date Time Provider Heriberto Bynum   12/15/2022  9:30 AM SCHEDULE, [facility] [address] [facility] Transition Nurse reviewed medical action plan and red flags with family and discussed any barriers to care and/or understanding of plan of care after discharge. Discussed appropriate site of care based on symptoms and resources available to patient including: PCP. The patient agrees to contact the PCP office for questions related to their healthcare. Advance Care Planning:   Did not address .      Patients top risk factors for readmission: functional cognitive ability, functional physical ability, and medical condition-DM  Interventions to address risk factors:  nurse assessment    Offered patient enrollment in the Remote Patient Monitoring (RPM) program for in-home monitoring: NA.     Care Transitions Subsequent and Final Call    Subsequent and Final Calls  Are you currently active with any services?: Home Health  Care Transitions Interventions   Home Care Waiver: Declined        Transportation Support: Declined    Meals on Wheels: Declined  DME Assistance: Declined     Senior Services: Declined      Social Work: Declined    Disease Association: Completed      Other Interventions:           No further follow-up call indicated based on severity of symptoms and risk factors. Plan for next call:  Care Transitions Episode Closed.     Echo Gilbert RN

## 2022-12-15 ENCOUNTER — TELEPHONE (OUTPATIENT)
Dept: FAMILY MEDICINE CLINIC | Age: 87
End: 2022-12-15

## 2022-12-15 ENCOUNTER — NURSE ONLY (OUTPATIENT)
Dept: FAMILY MEDICINE CLINIC | Age: 87
End: 2022-12-15
Payer: MEDICARE

## 2022-12-15 DIAGNOSIS — E11.29 TYPE 2 DIABETES MELLITUS WITH MICROALBUMINURIA, WITHOUT LONG-TERM CURRENT USE OF INSULIN (HCC): Primary | ICD-10-CM

## 2022-12-15 DIAGNOSIS — R80.9 TYPE 2 DIABETES MELLITUS WITH MICROALBUMINURIA, WITHOUT LONG-TERM CURRENT USE OF INSULIN (HCC): Primary | ICD-10-CM

## 2022-12-15 LAB — HBA1C MFR BLD: 7.8 % (ref 4.3–5.7)

## 2022-12-15 PROCEDURE — 83036 HEMOGLOBIN GLYCOSYLATED A1C: CPT | Performed by: FAMILY MEDICINE

## 2022-12-15 NOTE — TELEPHONE ENCOUNTER
----- Message from Karen Siddiqi MD sent at 12/15/2022  9:48 AM EST -----  A1c higher at 7.8 but ok for 96. Recheck a1c in 6 months.

## 2022-12-28 ENCOUNTER — OFFICE VISIT (OUTPATIENT)
Dept: FAMILY MEDICINE CLINIC | Age: 87
End: 2022-12-28

## 2022-12-28 VITALS
OXYGEN SATURATION: 98 % | TEMPERATURE: 97 F | RESPIRATION RATE: 16 BRPM | HEART RATE: 86 BPM | SYSTOLIC BLOOD PRESSURE: 122 MMHG | DIASTOLIC BLOOD PRESSURE: 72 MMHG

## 2022-12-28 DIAGNOSIS — E11.29 TYPE 2 DIABETES MELLITUS WITH MICROALBUMINURIA, WITHOUT LONG-TERM CURRENT USE OF INSULIN (HCC): ICD-10-CM

## 2022-12-28 DIAGNOSIS — E78.00 HYPERCHOLESTEREMIA: ICD-10-CM

## 2022-12-28 DIAGNOSIS — R80.9 TYPE 2 DIABETES MELLITUS WITH MICROALBUMINURIA, WITHOUT LONG-TERM CURRENT USE OF INSULIN (HCC): ICD-10-CM

## 2022-12-28 DIAGNOSIS — R22.0 SWELLING OF LEFT SIDE OF FACE: Primary | ICD-10-CM

## 2022-12-28 RX ORDER — CEPHALEXIN 500 MG/1
500 CAPSULE ORAL 2 TIMES DAILY
Qty: 20 CAPSULE | Refills: 0 | Status: SHIPPED | OUTPATIENT
Start: 2022-12-28 | End: 2023-01-07

## 2022-12-28 ASSESSMENT — ENCOUNTER SYMPTOMS
DIARRHEA: 0
EYE PAIN: 0
CONSTIPATION: 0
FACIAL SWELLING: 1
NAUSEA: 0
VOMITING: 0
SORE THROAT: 0
CHEST TIGHTNESS: 0
COUGH: 0
SHORTNESS OF BREATH: 0
RHINORRHEA: 1
BACK PAIN: 0
WHEEZING: 0
BLOOD IN STOOL: 0
ABDOMINAL PAIN: 0

## 2022-12-28 NOTE — PROGRESS NOTES
Jordan Barry (:  1926) is a 80 y.o. female,Established patient, here for evaluation of the following chief complaint(s):  Facial Swelling (Left cheek and lip swollen, discuss sugars)         ASSESSMENT/PLAN:  1. Swelling of left side of face  -     cephALEXin (KEFLEX) 500 MG capsule; Take 1 capsule by mouth 2 times daily for 10 days, Disp-20 capsule, R-0Normal  -unknown diagnosis/prognosis, possible abscess, no evidence of allergic reaction, trial of keflex, -monitor sxs, call if not improving    2. Type 2 diabetes mellitus with microalbuminuria, without long-term current use of insulin (HCC)  -stable, controlled on metformin,   Hemoglobin A1C   Date Value Ref Range Status   12/15/2022 7.8 (H) 4.3 - 5.7 % Final     Comment:     Performed at [facility] under CLIA # [id number]       3. Hypercholesteremia  -stable, controlled on pravastatin,   Lab Results   Component Value Date    CHOL 152 06/15/2022    CHOL see below 06/10/2020    CHOL 156 06/10/2020     Lab Results   Component Value Date    TRIG 97 06/15/2022    TRIG see below 06/10/2020    TRIG 65 06/10/2020     Lab Results   Component Value Date    HDL 47 06/15/2022    HDL see below 06/10/2020    HDL 54 06/10/2020     Lab Results   Component Value Date    LDLCALC 86 06/15/2022    LDLCALC see below 06/10/2020    LDLCALC 89 06/10/2020     Lab Results   Component Value Date    LABVLDL 14 2018    LABVLDL 14 04/10/2017    LABVLDL 13 2016    VLDL 13 2014    VLDL 1.6 2013    VLDL 15 2012     Lab Results   Component Value Date    CHOLHDLRATIO 2.7 2018    CHOLHDLRATIO 2.5 04/10/2017    CHOLHDLRATIO 2.4 2016       No follow-ups on file. Subjective   SUBJECTIVE/OBJECTIVE:  HPI   Patient here today for a check up. 3 to 4 days of facial swelling on the left side, below the lip. Runny nose. No swallowing problems. Wonders about sinus. Reviewed recent sugars, a1c of 7.8.   On metformin 3 tabs daily, amaryl prn if > 200. At 96, I feel this is adequate control. , non smoker, pmh reviewed. Review of Systems   Constitutional:  Negative for chills, fatigue, fever and unexpected weight change. HENT:  Positive for facial swelling and rhinorrhea. Negative for congestion, ear pain and sore throat. Eyes:  Negative for pain and visual disturbance. Respiratory:  Negative for cough, chest tightness, shortness of breath and wheezing. Cardiovascular:  Negative for chest pain and palpitations. Gastrointestinal:  Negative for abdominal pain, blood in stool, constipation, diarrhea, nausea and vomiting. Genitourinary:  Negative for difficulty urinating, frequency, hematuria and urgency. Musculoskeletal:  Negative for back pain, joint swelling, myalgias and neck pain. Skin:  Negative for rash. Neurological:  Negative for dizziness and headaches. Hematological:  Negative for adenopathy. Does not bruise/bleed easily. Psychiatric/Behavioral:  Negative for behavioral problems and sleep disturbance. The patient is not nervous/anxious. Objective   Physical Exam  Vitals and nursing note reviewed. Constitutional:       Appearance: She is well-developed. HENT:      Head: Normocephalic and atraumatic. Jaw: Swelling present. Right Ear: External ear normal.      Left Ear: External ear normal.      Nose: Nose normal.      Mouth/Throat:      Mouth: Mucous membranes are moist.   Eyes:      Pupils: Pupils are equal, round, and reactive to light. Neck:      Thyroid: No thyromegaly. Cardiovascular:      Rate and Rhythm: Normal rate and regular rhythm. Heart sounds: Normal heart sounds. Pulmonary:      Breath sounds: Normal breath sounds. No wheezing or rales. Abdominal:      General: Bowel sounds are normal.      Palpations: Abdomen is soft. Tenderness: There is no abdominal tenderness. There is no guarding or rebound. Musculoskeletal:         General: Normal range of motion. Cervical back: Neck supple. Lymphadenopathy:      Cervical: No cervical adenopathy. Skin:     General: Skin is warm and dry. Findings: No rash. Neurological:      Mental Status: She is alert and oriented to person, place, and time. Cranial Nerves: No cranial nerve deficit. Deep Tendon Reflexes: Reflexes are normal and symmetric. An electronic signature was used to authenticate this note.     --Дмитрий Smalls MD

## 2023-01-04 ENCOUNTER — OFFICE VISIT (OUTPATIENT)
Dept: FAMILY MEDICINE CLINIC | Age: 88
End: 2023-01-04

## 2023-01-04 ENCOUNTER — TELEPHONE (OUTPATIENT)
Dept: FAMILY MEDICINE CLINIC | Age: 88
End: 2023-01-04

## 2023-01-04 VITALS
DIASTOLIC BLOOD PRESSURE: 66 MMHG | BODY MASS INDEX: 18.54 KG/M2 | HEART RATE: 79 BPM | TEMPERATURE: 97.9 F | OXYGEN SATURATION: 97 % | WEIGHT: 91.8 LBS | RESPIRATION RATE: 16 BRPM | SYSTOLIC BLOOD PRESSURE: 116 MMHG

## 2023-01-04 DIAGNOSIS — M79.89 LEFT ARM SWELLING: Primary | ICD-10-CM

## 2023-01-04 DIAGNOSIS — E11.29 TYPE 2 DIABETES MELLITUS WITH MICROALBUMINURIA, WITHOUT LONG-TERM CURRENT USE OF INSULIN (HCC): ICD-10-CM

## 2023-01-04 DIAGNOSIS — R80.9 TYPE 2 DIABETES MELLITUS WITH MICROALBUMINURIA, WITHOUT LONG-TERM CURRENT USE OF INSULIN (HCC): ICD-10-CM

## 2023-01-04 DIAGNOSIS — E43 UNSPECIFIED SEVERE PROTEIN-CALORIE MALNUTRITION (HCC): ICD-10-CM

## 2023-01-04 LAB
ALBUMIN SERPL-MCNC: 3.7 G/DL (ref 3.5–5.1)
ALP BLD-CCNC: 99 U/L (ref 38–126)
ALT SERPL-CCNC: 9 U/L (ref 11–66)
ANION GAP SERPL CALCULATED.3IONS-SCNC: 12 MEQ/L (ref 8–16)
AST SERPL-CCNC: 31 U/L (ref 5–40)
BASOPHILS # BLD: 0.4 %
BASOPHILS ABSOLUTE: 0 THOU/MM3 (ref 0–0.1)
BILIRUB SERPL-MCNC: 0.3 MG/DL (ref 0.3–1.2)
BUN BLDV-MCNC: 11 MG/DL (ref 7–22)
CALCIUM SERPL-MCNC: 9.7 MG/DL (ref 8.5–10.5)
CHLORIDE BLD-SCNC: 94 MEQ/L (ref 98–111)
CO2: 27 MEQ/L (ref 23–33)
CREAT SERPL-MCNC: 0.8 MG/DL (ref 0.4–1.2)
D-DIMER QUANTITATIVE: 1785 NG/ML FEU (ref 0–500)
EOSINOPHIL # BLD: 2.2 %
EOSINOPHILS ABSOLUTE: 0.1 THOU/MM3 (ref 0–0.4)
ERYTHROCYTE [DISTWIDTH] IN BLOOD BY AUTOMATED COUNT: 13 % (ref 11.5–14.5)
ERYTHROCYTE [DISTWIDTH] IN BLOOD BY AUTOMATED COUNT: 45.8 FL (ref 35–45)
GFR SERPL CREATININE-BSD FRML MDRD: > 60 ML/MIN/1.73M2
GLUCOSE BLD-MCNC: 164 MG/DL (ref 70–108)
HCT VFR BLD CALC: 32.6 % (ref 37–47)
HEMOGLOBIN: 10.4 GM/DL (ref 12–16)
IMMATURE GRANS (ABS): 0.02 THOU/MM3 (ref 0–0.07)
IMMATURE GRANULOCYTES: 0.3 %
LYMPHOCYTES # BLD: 17.5 %
LYMPHOCYTES ABSOLUTE: 1.2 THOU/MM3 (ref 1–4.8)
MCH RBC QN AUTO: 30.8 PG (ref 26–33)
MCHC RBC AUTO-ENTMCNC: 31.9 GM/DL (ref 32.2–35.5)
MCV RBC AUTO: 96.4 FL (ref 81–99)
MONOCYTES # BLD: 13.4 %
MONOCYTES ABSOLUTE: 0.9 THOU/MM3 (ref 0.4–1.3)
NUCLEATED RED BLOOD CELLS: 0 /100 WBC
PLATELET # BLD: 316 THOU/MM3 (ref 130–400)
PMV BLD AUTO: 10.2 FL (ref 9.4–12.4)
POTASSIUM SERPL-SCNC: 4.6 MEQ/L (ref 3.5–5.2)
RBC # BLD: 3.38 MILL/MM3 (ref 4.2–5.4)
SEG NEUTROPHILS: 66.2 %
SEGMENTED NEUTROPHILS ABSOLUTE COUNT: 4.5 THOU/MM3 (ref 1.8–7.7)
SODIUM BLD-SCNC: 133 MEQ/L (ref 135–145)
TOTAL PROTEIN: 7.8 G/DL (ref 6.1–8)
WBC # BLD: 6.8 THOU/MM3 (ref 4.8–10.8)

## 2023-01-04 ASSESSMENT — ENCOUNTER SYMPTOMS
DIARRHEA: 0
ABDOMINAL PAIN: 0
SORE THROAT: 0
WHEEZING: 0
BLOOD IN STOOL: 0
CHEST TIGHTNESS: 0
EYE PAIN: 0
RHINORRHEA: 0
NAUSEA: 0
BACK PAIN: 0
VOMITING: 0
CONSTIPATION: 0
COUGH: 0
SHORTNESS OF BREATH: 0

## 2023-01-04 ASSESSMENT — PATIENT HEALTH QUESTIONNAIRE - PHQ9
1. LITTLE INTEREST OR PLEASURE IN DOING THINGS: 1
SUM OF ALL RESPONSES TO PHQ QUESTIONS 1-9: 2
SUM OF ALL RESPONSES TO PHQ QUESTIONS 1-9: 2
SUM OF ALL RESPONSES TO PHQ9 QUESTIONS 1 & 2: 2
SUM OF ALL RESPONSES TO PHQ QUESTIONS 1-9: 2
SUM OF ALL RESPONSES TO PHQ QUESTIONS 1-9: 2
2. FEELING DOWN, DEPRESSED OR HOPELESS: 1

## 2023-01-04 NOTE — PROGRESS NOTES
Rossy Aaron (:  1926) is a 80 y.o. female,Established patient, here for evaluation of the following chief complaint(s):  Swelling (Left arm and hand swelling since )         ASSESSMENT/PLAN:  1. Left arm swelling  -     CBC with Auto Differential; Future  -     Comprehensive Metabolic Panel; Future  -     D-Dimer, Quantitative; Future  -unknown diagnosis/prognosis, check blood work, check for clotting  2. Unspecified severe protein-calorie malnutrition ([address]  -     Comprehensive Metabolic Panel; Future  -unstable, slow weight loss, age expected. 3. Type 2 diabetes mellitus with microalbuminuria, without long-term current use of insulin (HCC)  -     Comprehensive Metabolic Panel; Future  -stable, controlled on metformin,   Hemoglobin A1C   Date Value Ref Range Status   12/15/2022 7.8 (H) 4.3 - 5.7 % Final     Comment:     Performed at [facility] under CLIA # [id number]       No follow-ups on file. Subjective   SUBJECTIVE/OBJECTIVE:  Swelling  Pertinent negatives include no abdominal pain, chest pain, chills, congestion, coughing, fatigue, fever, headaches, joint swelling, myalgias, nausea, neck pain, rash, sore throat or vomiting. Patient here today for a check up. Reviewed BMI of 19. Normal.  4 days of left arm swelling, comes and goes. No injuries. Has been eating beef soup ( high in sodium). , non smoker, pmh reviewed. Review of Systems   Constitutional:  Negative for chills, fatigue, fever and unexpected weight change. HENT:  Negative for congestion, ear pain, rhinorrhea and sore throat. Eyes:  Negative for pain and visual disturbance. Respiratory:  Negative for cough, chest tightness, shortness of breath and wheezing. Cardiovascular:  Negative for chest pain and palpitations. Gastrointestinal:  Negative for abdominal pain, blood in stool, constipation, diarrhea, nausea and vomiting.    Genitourinary:  Negative for difficulty urinating, frequency, hematuria and urgency. Musculoskeletal:  Negative for back pain, joint swelling, myalgias and neck pain. Skin:  Negative for rash. Neurological:  Negative for dizziness and headaches. Hematological:  Negative for adenopathy. Does not bruise/bleed easily. Psychiatric/Behavioral:  Negative for behavioral problems and sleep disturbance. The patient is not nervous/anxious. Objective   Physical Exam  Vitals and nursing note reviewed. Constitutional:       Appearance: She is well-developed. HENT:      Head: Normocephalic and atraumatic. Right Ear: External ear normal.      Left Ear: External ear normal.      Nose: Nose normal.      Mouth/Throat:      Mouth: Mucous membranes are moist.   Eyes:      Pupils: Pupils are equal, round, and reactive to light. Neck:      Thyroid: No thyromegaly. Cardiovascular:      Rate and Rhythm: Normal rate and regular rhythm. Heart sounds: Normal heart sounds. Pulmonary:      Breath sounds: Normal breath sounds. No wheezing or rales. Abdominal:      General: Bowel sounds are normal.      Palpations: Abdomen is soft. Tenderness: There is no abdominal tenderness. There is no guarding or rebound. Musculoskeletal:         General: Normal range of motion. Left upper arm: Swelling present. Arms:       Cervical back: Neck supple. Lymphadenopathy:      Cervical: No cervical adenopathy. Skin:     General: Skin is warm and dry. Findings: No rash. Neurological:      Mental Status: She is alert and oriented to person, place, and time. Cranial Nerves: No cranial nerve deficit. Deep Tendon Reflexes: Reflexes are normal and symmetric. An electronic signature was used to authenticate this note.     --Ivonne Hawkins MD

## 2023-01-04 NOTE — TELEPHONE ENCOUNTER
----- Message from Kameron Martines MD sent at 1/4/2023  4:47 PM EST -----  CMP is stable. Sodium is stable. CBC is improved. D dimer is increased. Set up left upper extremity venous doppler to rule out DVT.

## 2023-01-04 NOTE — TELEPHONE ENCOUNTER
Sharath notified and verbalized understanding.  [facility] afternoon appt  Appt scheduled 1/5/23 at 2 pm.  [facility] notified of appt date and time   LIMA VL DUP UPPER EXTREMITY VENOUS LEFT  PREPS:  * Arrive 15 minutes prior  * TAKE YOUR BLOOD PRESSURE MEDICATION AS USUAL   Located on the second floor,  [facility]

## 2023-01-05 ENCOUNTER — TELEPHONE (OUTPATIENT)
Dept: FAMILY MEDICINE CLINIC | Age: 88
End: 2023-01-05

## 2023-01-05 ENCOUNTER — HOSPITAL ENCOUNTER (OUTPATIENT)
Dept: INTERVENTIONAL RADIOLOGY/VASCULAR | Age: 88
Discharge: HOME | End: 2023-01-05
Payer: MEDICARE

## 2023-01-05 DIAGNOSIS — M79.89 LEFT ARM SWELLING: ICD-10-CM

## 2023-01-05 PROCEDURE — 93971 EXTREMITY STUDY: CPT

## 2023-01-05 NOTE — TELEPHONE ENCOUNTER
----- Message from Mirela Quezada MD sent at 1/5/2023  4:19 PM EST -----  Notify doppler is ok, no clotting seen.

## 2023-04-17 ENCOUNTER — OFFICE VISIT (OUTPATIENT)
Dept: FAMILY MEDICINE CLINIC | Age: 88
End: 2023-04-17
Payer: MEDICARE

## 2023-04-17 VITALS
TEMPERATURE: 97.8 F | DIASTOLIC BLOOD PRESSURE: 64 MMHG | SYSTOLIC BLOOD PRESSURE: 108 MMHG | BODY MASS INDEX: 20.12 KG/M2 | WEIGHT: 99.6 LBS | HEART RATE: 80 BPM

## 2023-04-17 DIAGNOSIS — E11.29 TYPE 2 DIABETES MELLITUS WITH MICROALBUMINURIA, WITHOUT LONG-TERM CURRENT USE OF INSULIN (HCC): ICD-10-CM

## 2023-04-17 DIAGNOSIS — R80.9 TYPE 2 DIABETES MELLITUS WITH MICROALBUMINURIA, WITHOUT LONG-TERM CURRENT USE OF INSULIN (HCC): ICD-10-CM

## 2023-04-17 DIAGNOSIS — C50.912 MALIGNANT NEOPLASM OF LEFT FEMALE BREAST, UNSPECIFIED ESTROGEN RECEPTOR STATUS, UNSPECIFIED SITE OF BREAST (HCC): Primary | ICD-10-CM

## 2023-04-17 DIAGNOSIS — M79.89 LEFT ARM SWELLING: ICD-10-CM

## 2023-04-17 PROCEDURE — 99214 OFFICE O/P EST MOD 30 MIN: CPT | Performed by: FAMILY MEDICINE

## 2023-04-17 PROCEDURE — G8427 DOCREV CUR MEDS BY ELIG CLIN: HCPCS | Performed by: FAMILY MEDICINE

## 2023-04-17 PROCEDURE — 1090F PRES/ABSN URINE INCON ASSESS: CPT | Performed by: FAMILY MEDICINE

## 2023-04-17 PROCEDURE — 1123F ACP DISCUSS/DSCN MKR DOCD: CPT | Performed by: FAMILY MEDICINE

## 2023-04-17 PROCEDURE — G8420 CALC BMI NORM PARAMETERS: HCPCS | Performed by: FAMILY MEDICINE

## 2023-04-17 PROCEDURE — 1036F TOBACCO NON-USER: CPT | Performed by: FAMILY MEDICINE

## 2023-04-17 SDOH — ECONOMIC STABILITY: HOUSING INSECURITY
IN THE LAST 12 MONTHS, WAS THERE A TIME WHEN YOU DID NOT HAVE A STEADY PLACE TO SLEEP OR SLEPT IN A SHELTER (INCLUDING NOW)?: NO

## 2023-04-17 SDOH — ECONOMIC STABILITY: INCOME INSECURITY: HOW HARD IS IT FOR YOU TO PAY FOR THE VERY BASICS LIKE FOOD, HOUSING, MEDICAL CARE, AND HEATING?: NOT HARD AT ALL

## 2023-04-17 SDOH — ECONOMIC STABILITY: FOOD INSECURITY: WITHIN THE PAST 12 MONTHS, THE FOOD YOU BOUGHT JUST DIDN'T LAST AND YOU DIDN'T HAVE MONEY TO GET MORE.: NEVER TRUE

## 2023-04-17 SDOH — ECONOMIC STABILITY: FOOD INSECURITY: WITHIN THE PAST 12 MONTHS, YOU WORRIED THAT YOUR FOOD WOULD RUN OUT BEFORE YOU GOT MONEY TO BUY MORE.: NEVER TRUE

## 2023-04-17 ASSESSMENT — ENCOUNTER SYMPTOMS
COUGH: 1
WHEEZING: 0
NAUSEA: 0
VOMITING: 0
EYE PAIN: 0
RHINORRHEA: 1
CONSTIPATION: 0
BLOOD IN STOOL: 0
BACK PAIN: 0
DIARRHEA: 0
SORE THROAT: 0
SHORTNESS OF BREATH: 0
ABDOMINAL PAIN: 0
CHEST TIGHTNESS: 0

## 2023-04-17 NOTE — PROGRESS NOTES
Isaac Knight (:  1926) is a 80 y.o. female,Established patient, here for evaluation of the following chief complaint(s):  Arm Swelling (Left arm swelling, hand is filling up with fluid, runny nose, cough, sneezing, clear fluid)         ASSESSMENT/PLAN:  1. Malignant neoplasm of left female breast, unspecified estrogen receptor status, unspecified site of breast ([address]  -unstable, extensive chest wall involvement, previously has declined treatment  2. Left arm swelling  -unstable, secondary to #1, likely lymphatic system is blocked, swelling will be chronic. 3. Type 2 diabetes mellitus with microalbuminuria, without long-term current use of insulin (HCC)  -stable, controlled on metformin,   Hemoglobin A1C   Date Value Ref Range Status   12/15/2022 7.8 (H) 4.3 - 5.7 % Final     Comment:     Performed at [facility] under CLIA # [id number]         No follow-ups on file. Subjective   SUBJECTIVE/OBJECTIVE:  HPI Patient here today for a check up. Reviewed BMI of 18. Having left arm swelling compared to right arm. Some pain. Runny nose, cough, sneezing. , non smoker, pmh reviewed. Review of Systems   Constitutional:  Negative for chills, fatigue, fever and unexpected weight change. HENT:  Positive for rhinorrhea and sneezing. Negative for congestion, ear pain and sore throat. Eyes:  Negative for pain and visual disturbance. Respiratory:  Positive for cough. Negative for chest tightness, shortness of breath and wheezing. Cardiovascular:  Negative for chest pain and palpitations. Left arm swelling   Gastrointestinal:  Negative for abdominal pain, blood in stool, constipation, diarrhea, nausea and vomiting. Genitourinary:  Negative for difficulty urinating, frequency, hematuria and urgency. Musculoskeletal:  Negative for back pain, joint swelling, myalgias and neck pain. Skin:  Negative for rash. Neurological:  Negative for dizziness and headaches.

## 2023-05-24 NOTE — TELEPHONE ENCOUNTER
Attempted to contact Pt, no answer no VM. Letter mailed. [see HPI] : see HPI [Negative] : Heme/Lymph

## 2023-05-26 DIAGNOSIS — E78.00 HYPERCHOLESTEREMIA: ICD-10-CM

## 2023-05-26 DIAGNOSIS — E78.00 PURE HYPERCHOLESTEROLEMIA: ICD-10-CM

## 2023-05-26 DIAGNOSIS — E11.9 TYPE 2 DIABETES MELLITUS WITHOUT COMPLICATION, WITHOUT LONG-TERM CURRENT USE OF INSULIN (HCC): ICD-10-CM

## 2023-05-26 RX ORDER — METFORMIN HYDROCHLORIDE 500 MG/1
TABLET, EXTENDED RELEASE ORAL
Qty: 270 TABLET | Refills: 3 | OUTPATIENT
Start: 2023-05-26

## 2023-05-26 RX ORDER — PRAVASTATIN SODIUM 40 MG
TABLET ORAL
Qty: 90 TABLET | Refills: 3 | OUTPATIENT
Start: 2023-05-26

## 2023-06-16 ENCOUNTER — HOSPITAL ENCOUNTER (INPATIENT)
Age: 88
LOS: 3 days | Discharge: HOSPICE | DRG: 947 | End: 2023-06-19
Attending: EMERGENCY MEDICINE | Admitting: HOSPITALIST
Payer: MEDICARE

## 2023-06-16 ENCOUNTER — APPOINTMENT (OUTPATIENT)
Dept: GENERAL RADIOLOGY | Age: 88
DRG: 947 | End: 2023-06-16
Payer: MEDICARE

## 2023-06-16 ENCOUNTER — APPOINTMENT (OUTPATIENT)
Dept: CT IMAGING | Age: 88
DRG: 947 | End: 2023-06-16
Payer: MEDICARE

## 2023-06-16 DIAGNOSIS — E87.1 HYPONATREMIA: ICD-10-CM

## 2023-06-16 DIAGNOSIS — R77.8 ELEVATED TROPONIN: ICD-10-CM

## 2023-06-16 DIAGNOSIS — J90 PLEURAL EFFUSION ON LEFT: ICD-10-CM

## 2023-06-16 DIAGNOSIS — J18.9 PNEUMONIA OF BOTH UPPER LOBES DUE TO INFECTIOUS ORGANISM: ICD-10-CM

## 2023-06-16 DIAGNOSIS — R41.82 ALTERED MENTAL STATUS, UNSPECIFIED ALTERED MENTAL STATUS TYPE: Primary | ICD-10-CM

## 2023-06-16 DIAGNOSIS — Z51.5 END OF LIFE CARE: ICD-10-CM

## 2023-06-16 DIAGNOSIS — C50.912 MALIGNANT NEOPLASM OF LEFT FEMALE BREAST, UNSPECIFIED ESTROGEN RECEPTOR STATUS, UNSPECIFIED SITE OF BREAST (HCC): ICD-10-CM

## 2023-06-16 LAB
ALBUMIN SERPL BCG-MCNC: 3.4 G/DL (ref 3.5–5.1)
ALP SERPL-CCNC: 139 U/L (ref 38–126)
ALT SERPL W/O P-5'-P-CCNC: 14 U/L (ref 11–66)
AMMONIA PLAS-MCNC: 20 UMOL/L (ref 11–60)
ANION GAP SERPL CALC-SCNC: 12 MEQ/L (ref 8–16)
AST SERPL-CCNC: 41 U/L (ref 5–40)
BACTERIA URNS QL MICRO: ABNORMAL /HPF
BASE EXCESS BLDA CALC-SCNC: -3.8 MMOL/L (ref -2–3)
BASOPHILS ABSOLUTE: 0 THOU/MM3 (ref 0–0.1)
BASOPHILS NFR BLD AUTO: 0.3 %
BILIRUB CONJ SERPL-MCNC: < 0.2 MG/DL (ref 0–0.3)
BILIRUB SERPL-MCNC: 0.3 MG/DL (ref 0.3–1.2)
BILIRUB UR QL STRIP.AUTO: NEGATIVE
BUN SERPL-MCNC: 30 MG/DL (ref 7–22)
CALCIUM SERPL-MCNC: 8.7 MG/DL (ref 8.5–10.5)
CASTS #/AREA URNS LPF: ABNORMAL /LPF
CASTS 2: ABNORMAL /LPF
CHARACTER UR: CLEAR
CHLORIDE SERPL-SCNC: 92 MEQ/L (ref 98–111)
CO2 SERPL-SCNC: 24 MEQ/L (ref 23–33)
COLLECTED BY:: ABNORMAL
COLOR: YELLOW
CREAT SERPL-MCNC: 0.8 MG/DL (ref 0.4–1.2)
CRYSTALS URNS MICRO: ABNORMAL
DEPRECATED RDW RBC AUTO: 48.4 FL (ref 35–45)
EOSINOPHIL NFR BLD AUTO: 2 %
EOSINOPHILS ABSOLUTE: 0.2 THOU/MM3 (ref 0–0.4)
EPITHELIAL CELLS, UA: ABNORMAL /HPF
ERYTHROCYTE [DISTWIDTH] IN BLOOD BY AUTOMATED COUNT: 13.6 % (ref 11.5–14.5)
FLUAV RNA RESP QL NAA+PROBE: NOT DETECTED
FLUBV RNA RESP QL NAA+PROBE: NOT DETECTED
GFR SERPL CREATININE-BSD FRML MDRD: > 60 ML/MIN/1.73M2
GLUCOSE BLD STRIP.AUTO-MCNC: 149 MG/DL (ref 70–108)
GLUCOSE SERPL-MCNC: 211 MG/DL (ref 70–108)
GLUCOSE UR QL STRIP.AUTO: 100 MG/DL
HCO3 BLDA-SCNC: 20 MMOL/L (ref 23–28)
HCT VFR BLD AUTO: 29.8 % (ref 37–47)
HGB BLD-MCNC: 9.5 GM/DL (ref 12–16)
HGB UR QL STRIP.AUTO: ABNORMAL
IMM GRANULOCYTES # BLD AUTO: 0.05 THOU/MM3 (ref 0–0.07)
IMM GRANULOCYTES NFR BLD AUTO: 0.5 %
KETONES UR QL STRIP.AUTO: NEGATIVE
LACTATE SERPL-SCNC: 1.4 MMOL/L (ref 0.5–2)
LIPASE SERPL-CCNC: 21.4 U/L (ref 5.6–51.3)
LYMPHOCYTES ABSOLUTE: 1 THOU/MM3 (ref 1–4.8)
LYMPHOCYTES NFR BLD AUTO: 10 %
MAGNESIUM SERPL-MCNC: 2.1 MG/DL (ref 1.6–2.4)
MCH RBC QN AUTO: 31.3 PG (ref 26–33)
MCHC RBC AUTO-ENTMCNC: 31.9 GM/DL (ref 32.2–35.5)
MCV RBC AUTO: 98 FL (ref 81–99)
MISCELLANEOUS 2: ABNORMAL
MONOCYTES ABSOLUTE: 1.6 THOU/MM3 (ref 0.4–1.3)
MONOCYTES NFR BLD AUTO: 16.4 %
NEUTROPHILS NFR BLD AUTO: 70.8 %
NITRITE UR QL STRIP: NEGATIVE
NRBC BLD AUTO-RTO: 0 /100 WBC
OSMOLALITY SERPL CALC.SUM OF ELEC: 269.5 MOSMOL/KG (ref 275–300)
PCO2 TEMP ADJ BLDMV: 31 MMHG (ref 41–51)
PH BLDMV: 7.42 [PH] (ref 7.31–7.41)
PH UR STRIP.AUTO: 6.5 [PH] (ref 5–9)
PLATELET # BLD AUTO: 422 THOU/MM3 (ref 130–400)
PMV BLD AUTO: 9.5 FL (ref 9.4–12.4)
PO2 BLDMV: 30 MMHG (ref 25–40)
POTASSIUM SERPL-SCNC: 5.4 MEQ/L (ref 3.5–5.2)
PROT SERPL-MCNC: 7.5 G/DL (ref 6.1–8)
PROT UR STRIP.AUTO-MCNC: ABNORMAL MG/DL
RBC # BLD AUTO: 3.04 MILL/MM3 (ref 4.2–5.4)
RBC URINE: ABNORMAL /HPF
RENAL EPI CELLS #/AREA URNS HPF: ABNORMAL /[HPF]
SAO2 % BLDMV: 60 %
SARS-COV-2 RNA RESP QL NAA+PROBE: NOT DETECTED
SEGMENTED NEUTROPHILS ABSOLUTE COUNT: 6.9 THOU/MM3 (ref 1.8–7.7)
SODIUM SERPL-SCNC: 128 MEQ/L (ref 135–145)
SP GR UR REFRACT.AUTO: 1.02 (ref 1–1.03)
T4 FREE SERPL-MCNC: 0.84 NG/DL (ref 0.93–1.76)
TROPONIN T: 0.01 NG/ML
TROPONIN T: 0.02 NG/ML
TROPONIN T: 0.03 NG/ML
TSH SERPL DL<=0.005 MIU/L-ACNC: 11.98 UIU/ML (ref 0.4–4.2)
UROBILINOGEN, URINE: 0.2 EU/DL (ref 0–1)
WBC # BLD AUTO: 9.7 THOU/MM3 (ref 4.8–10.8)
WBC #/AREA URNS HPF: ABNORMAL /HPF
WBC #/AREA URNS HPF: NEGATIVE /[HPF]
YEAST LIKE FUNGI URNS QL MICRO: ABNORMAL

## 2023-06-16 PROCEDURE — 80053 COMPREHEN METABOLIC PANEL: CPT

## 2023-06-16 PROCEDURE — 2580000003 HC RX 258: Performed by: EMERGENCY MEDICINE

## 2023-06-16 PROCEDURE — 82948 REAGENT STRIP/BLOOD GLUCOSE: CPT

## 2023-06-16 PROCEDURE — 85025 COMPLETE CBC W/AUTO DIFF WBC: CPT

## 2023-06-16 PROCEDURE — 1200000003 HC TELEMETRY R&B

## 2023-06-16 PROCEDURE — 82140 ASSAY OF AMMONIA: CPT

## 2023-06-16 PROCEDURE — 96365 THER/PROPH/DIAG IV INF INIT: CPT

## 2023-06-16 PROCEDURE — 93005 ELECTROCARDIOGRAM TRACING: CPT | Performed by: EMERGENCY MEDICINE

## 2023-06-16 PROCEDURE — 84439 ASSAY OF FREE THYROXINE: CPT

## 2023-06-16 PROCEDURE — 99285 EMERGENCY DEPT VISIT HI MDM: CPT

## 2023-06-16 PROCEDURE — 81001 URINALYSIS AUTO W/SCOPE: CPT

## 2023-06-16 PROCEDURE — 99223 1ST HOSP IP/OBS HIGH 75: CPT

## 2023-06-16 PROCEDURE — 2580000003 HC RX 258

## 2023-06-16 PROCEDURE — 93010 ELECTROCARDIOGRAM REPORT: CPT | Performed by: NUCLEAR MEDICINE

## 2023-06-16 PROCEDURE — 84443 ASSAY THYROID STIM HORMONE: CPT

## 2023-06-16 PROCEDURE — 83605 ASSAY OF LACTIC ACID: CPT

## 2023-06-16 PROCEDURE — 36415 COLL VENOUS BLD VENIPUNCTURE: CPT

## 2023-06-16 PROCEDURE — 83735 ASSAY OF MAGNESIUM: CPT

## 2023-06-16 PROCEDURE — 82248 BILIRUBIN DIRECT: CPT

## 2023-06-16 PROCEDURE — 6360000004 HC RX CONTRAST MEDICATION: Performed by: EMERGENCY MEDICINE

## 2023-06-16 PROCEDURE — 82803 BLOOD GASES ANY COMBINATION: CPT

## 2023-06-16 PROCEDURE — 71046 X-RAY EXAM CHEST 2 VIEWS: CPT

## 2023-06-16 PROCEDURE — 87040 BLOOD CULTURE FOR BACTERIA: CPT

## 2023-06-16 PROCEDURE — 71275 CT ANGIOGRAPHY CHEST: CPT

## 2023-06-16 PROCEDURE — 87636 SARSCOV2 & INF A&B AMP PRB: CPT

## 2023-06-16 PROCEDURE — 6360000002 HC RX W HCPCS: Performed by: EMERGENCY MEDICINE

## 2023-06-16 PROCEDURE — 83690 ASSAY OF LIPASE: CPT

## 2023-06-16 PROCEDURE — 84484 ASSAY OF TROPONIN QUANT: CPT

## 2023-06-16 RX ORDER — FERROUS SULFATE 325(65) MG
325 TABLET ORAL
Status: DISCONTINUED | OUTPATIENT
Start: 2023-06-17 | End: 2023-06-19 | Stop reason: HOSPADM

## 2023-06-16 RX ORDER — ACETAMINOPHEN 500 MG
1000 TABLET ORAL EVERY 6 HOURS PRN
Status: ON HOLD | COMMUNITY
End: 2023-06-19 | Stop reason: HOSPADM

## 2023-06-16 RX ORDER — LISINOPRIL 10 MG/1
10 TABLET ORAL DAILY
Status: DISCONTINUED | OUTPATIENT
Start: 2023-06-17 | End: 2023-06-19 | Stop reason: HOSPADM

## 2023-06-16 RX ORDER — MAGNESIUM SULFATE IN WATER 40 MG/ML
2000 INJECTION, SOLUTION INTRAVENOUS PRN
Status: DISCONTINUED | OUTPATIENT
Start: 2023-06-16 | End: 2023-06-16

## 2023-06-16 RX ORDER — ENOXAPARIN SODIUM 100 MG/ML
30 INJECTION SUBCUTANEOUS DAILY
Status: DISCONTINUED | OUTPATIENT
Start: 2023-06-17 | End: 2023-06-19 | Stop reason: HOSPADM

## 2023-06-16 RX ORDER — ACETAMINOPHEN 325 MG/1
650 TABLET ORAL EVERY 6 HOURS PRN
Status: DISCONTINUED | OUTPATIENT
Start: 2023-06-16 | End: 2023-06-19 | Stop reason: HOSPADM

## 2023-06-16 RX ORDER — MULTIVITAMIN WITH IRON
1 TABLET ORAL DAILY
Status: DISCONTINUED | OUTPATIENT
Start: 2023-06-17 | End: 2023-06-19 | Stop reason: HOSPADM

## 2023-06-16 RX ORDER — ACETAMINOPHEN 650 MG/1
650 SUPPOSITORY RECTAL EVERY 6 HOURS PRN
Status: DISCONTINUED | OUTPATIENT
Start: 2023-06-16 | End: 2023-06-19 | Stop reason: HOSPADM

## 2023-06-16 RX ORDER — ERYTHROMYCIN 5 MG/G
OINTMENT OPHTHALMIC NIGHTLY
COMMUNITY

## 2023-06-16 RX ORDER — INSULIN LISPRO 100 [IU]/ML
0-4 INJECTION, SOLUTION INTRAVENOUS; SUBCUTANEOUS
Status: DISCONTINUED | OUTPATIENT
Start: 2023-06-17 | End: 2023-06-19 | Stop reason: HOSPADM

## 2023-06-16 RX ORDER — SODIUM CHLORIDE 9 MG/ML
INJECTION, SOLUTION INTRAVENOUS CONTINUOUS
Status: DISCONTINUED | OUTPATIENT
Start: 2023-06-16 | End: 2023-06-16 | Stop reason: HOSPADM

## 2023-06-16 RX ORDER — LEVOTHYROXINE SODIUM 0.05 MG/1
50 TABLET ORAL DAILY
Status: DISCONTINUED | OUTPATIENT
Start: 2023-06-17 | End: 2023-06-19 | Stop reason: HOSPADM

## 2023-06-16 RX ORDER — POLYETHYLENE GLYCOL 3350 17 G/17G
17 POWDER, FOR SOLUTION ORAL DAILY PRN
Status: DISCONTINUED | OUTPATIENT
Start: 2023-06-16 | End: 2023-06-19 | Stop reason: HOSPADM

## 2023-06-16 RX ORDER — SODIUM CHLORIDE 0.9 % (FLUSH) 0.9 %
5-40 SYRINGE (ML) INJECTION EVERY 12 HOURS SCHEDULED
Status: DISCONTINUED | OUTPATIENT
Start: 2023-06-16 | End: 2023-06-19 | Stop reason: HOSPADM

## 2023-06-16 RX ORDER — INSULIN LISPRO 100 [IU]/ML
0-4 INJECTION, SOLUTION INTRAVENOUS; SUBCUTANEOUS NIGHTLY
Status: DISCONTINUED | OUTPATIENT
Start: 2023-06-16 | End: 2023-06-19 | Stop reason: HOSPADM

## 2023-06-16 RX ORDER — SODIUM CHLORIDE 0.9 % (FLUSH) 0.9 %
5-40 SYRINGE (ML) INJECTION PRN
Status: DISCONTINUED | OUTPATIENT
Start: 2023-06-16 | End: 2023-06-19 | Stop reason: HOSPADM

## 2023-06-16 RX ORDER — CARBOXYMETHYLCELLULOSE SODIUM 2.5 MG/ML
SOLUTION/ DROPS OPHTHALMIC
COMMUNITY

## 2023-06-16 RX ORDER — POTASSIUM CHLORIDE 20 MEQ/1
40 TABLET, EXTENDED RELEASE ORAL PRN
Status: DISCONTINUED | OUTPATIENT
Start: 2023-06-16 | End: 2023-06-16

## 2023-06-16 RX ORDER — ONDANSETRON 2 MG/ML
4 INJECTION INTRAMUSCULAR; INTRAVENOUS EVERY 6 HOURS PRN
Status: DISCONTINUED | OUTPATIENT
Start: 2023-06-16 | End: 2023-06-19 | Stop reason: HOSPADM

## 2023-06-16 RX ORDER — DEXTROSE MONOHYDRATE 100 MG/ML
INJECTION, SOLUTION INTRAVENOUS CONTINUOUS PRN
Status: DISCONTINUED | OUTPATIENT
Start: 2023-06-16 | End: 2023-06-19 | Stop reason: HOSPADM

## 2023-06-16 RX ORDER — ASPIRIN 81 MG/1
81 TABLET ORAL DAILY
Status: DISCONTINUED | OUTPATIENT
Start: 2023-06-17 | End: 2023-06-19 | Stop reason: HOSPADM

## 2023-06-16 RX ORDER — PRAVASTATIN SODIUM 40 MG
40 TABLET ORAL EVERY EVENING
Status: DISCONTINUED | OUTPATIENT
Start: 2023-06-16 | End: 2023-06-19 | Stop reason: HOSPADM

## 2023-06-16 RX ORDER — POTASSIUM CHLORIDE 7.45 MG/ML
10 INJECTION INTRAVENOUS PRN
Status: DISCONTINUED | OUTPATIENT
Start: 2023-06-16 | End: 2023-06-16

## 2023-06-16 RX ORDER — ONDANSETRON 4 MG/1
4 TABLET, ORALLY DISINTEGRATING ORAL EVERY 8 HOURS PRN
Status: DISCONTINUED | OUTPATIENT
Start: 2023-06-16 | End: 2023-06-19 | Stop reason: HOSPADM

## 2023-06-16 RX ORDER — SODIUM CHLORIDE 9 MG/ML
INJECTION, SOLUTION INTRAVENOUS PRN
Status: DISCONTINUED | OUTPATIENT
Start: 2023-06-16 | End: 2023-06-19 | Stop reason: HOSPADM

## 2023-06-16 RX ADMIN — SODIUM CHLORIDE, PRESERVATIVE FREE 10 ML: 5 INJECTION INTRAVENOUS at 22:13

## 2023-06-16 RX ADMIN — SODIUM CHLORIDE: 9 INJECTION, SOLUTION INTRAVENOUS at 17:40

## 2023-06-16 RX ADMIN — CEFTRIAXONE SODIUM 1000 MG: 1 INJECTION, POWDER, FOR SOLUTION INTRAMUSCULAR; INTRAVENOUS at 17:41

## 2023-06-16 RX ADMIN — IOPAMIDOL 80 ML: 755 INJECTION, SOLUTION INTRAVENOUS at 18:16

## 2023-06-16 ASSESSMENT — PAIN - FUNCTIONAL ASSESSMENT
PAIN_FUNCTIONAL_ASSESSMENT: NONE - DENIES PAIN

## 2023-06-17 ENCOUNTER — APPOINTMENT (OUTPATIENT)
Dept: CT IMAGING | Age: 88
DRG: 947 | End: 2023-06-17
Payer: MEDICARE

## 2023-06-17 LAB
ALBUMIN SERPL BCG-MCNC: 2.7 G/DL (ref 3.5–5.1)
ALP SERPL-CCNC: 122 U/L (ref 38–126)
ALT SERPL W/O P-5'-P-CCNC: 13 U/L (ref 11–66)
ANION GAP SERPL CALC-SCNC: 11 MEQ/L (ref 8–16)
AST SERPL-CCNC: 41 U/L (ref 5–40)
BASOPHILS ABSOLUTE: 0 THOU/MM3 (ref 0–0.1)
BASOPHILS NFR BLD AUTO: 0.3 %
BILIRUB SERPL-MCNC: 0.3 MG/DL (ref 0.3–1.2)
BUN SERPL-MCNC: 24 MG/DL (ref 7–22)
CALCIUM SERPL-MCNC: 8.5 MG/DL (ref 8.5–10.5)
CHLORIDE SERPL-SCNC: 96 MEQ/L (ref 98–111)
CO2 SERPL-SCNC: 20 MEQ/L (ref 23–33)
CREAT SERPL-MCNC: 0.6 MG/DL (ref 0.4–1.2)
DEPRECATED MEAN GLUCOSE BLD GHB EST-ACNC: 204 MG/DL (ref 70–126)
DEPRECATED RDW RBC AUTO: 47.8 FL (ref 35–45)
EOSINOPHIL NFR BLD AUTO: 1.1 %
EOSINOPHILS ABSOLUTE: 0.1 THOU/MM3 (ref 0–0.4)
ERYTHROCYTE [DISTWIDTH] IN BLOOD BY AUTOMATED COUNT: 13.5 % (ref 11.5–14.5)
FERRITIN SERPL IA-MCNC: 256 NG/ML (ref 10–291)
FOLATE SERPL-MCNC: > 20 NG/ML (ref 4.8–24.2)
GFR SERPL CREATININE-BSD FRML MDRD: > 60 ML/MIN/1.73M2
GLUCOSE BLD STRIP.AUTO-MCNC: 154 MG/DL (ref 70–108)
GLUCOSE BLD STRIP.AUTO-MCNC: 165 MG/DL (ref 70–108)
GLUCOSE BLD STRIP.AUTO-MCNC: 167 MG/DL (ref 70–108)
GLUCOSE BLD STRIP.AUTO-MCNC: 250 MG/DL (ref 70–108)
GLUCOSE SERPL-MCNC: 152 MG/DL (ref 70–108)
HBA1C MFR BLD HPLC: 8.8 % (ref 4.4–6.4)
HCT VFR BLD AUTO: 28.3 % (ref 37–47)
HGB BLD-MCNC: 9.4 GM/DL (ref 12–16)
IMM GRANULOCYTES # BLD AUTO: 0.06 THOU/MM3 (ref 0–0.07)
IMM GRANULOCYTES NFR BLD AUTO: 0.6 %
IRON SATN MFR SERPL: 18 % (ref 20–50)
IRON SERPL-MCNC: 38 UG/DL (ref 50–170)
LYMPHOCYTES ABSOLUTE: 0.7 THOU/MM3 (ref 1–4.8)
LYMPHOCYTES NFR BLD AUTO: 7 %
MAGNESIUM SERPL-MCNC: 1.8 MG/DL (ref 1.6–2.4)
MCH RBC QN AUTO: 32 PG (ref 26–33)
MCHC RBC AUTO-ENTMCNC: 33.2 GM/DL (ref 32.2–35.5)
MCV RBC AUTO: 96.3 FL (ref 81–99)
MONOCYTES ABSOLUTE: 1.3 THOU/MM3 (ref 0.4–1.3)
MONOCYTES NFR BLD AUTO: 13.6 %
NEUTROPHILS NFR BLD AUTO: 77.4 %
NRBC BLD AUTO-RTO: 0 /100 WBC
PLATELET # BLD AUTO: 423 THOU/MM3 (ref 130–400)
PMV BLD AUTO: 9.3 FL (ref 9.4–12.4)
POTASSIUM SERPL-SCNC: 5 MEQ/L (ref 3.5–5.2)
PROT SERPL-MCNC: 6.7 G/DL (ref 6.1–8)
RBC # BLD AUTO: 2.94 MILL/MM3 (ref 4.2–5.4)
SEGMENTED NEUTROPHILS ABSOLUTE COUNT: 7.4 THOU/MM3 (ref 1.8–7.7)
SODIUM SERPL-SCNC: 127 MEQ/L (ref 135–145)
TIBC SERPL-MCNC: 213 UG/DL (ref 171–450)
VIT B12 SERPL-MCNC: 810 PG/ML (ref 211–911)
WBC # BLD AUTO: 9.5 THOU/MM3 (ref 4.8–10.8)

## 2023-06-17 PROCEDURE — 80053 COMPREHEN METABOLIC PANEL: CPT

## 2023-06-17 PROCEDURE — 92526 ORAL FUNCTION THERAPY: CPT

## 2023-06-17 PROCEDURE — 70450 CT HEAD/BRAIN W/O DYE: CPT

## 2023-06-17 PROCEDURE — 82746 ASSAY OF FOLIC ACID SERUM: CPT

## 2023-06-17 PROCEDURE — 82948 REAGENT STRIP/BLOOD GLUCOSE: CPT

## 2023-06-17 PROCEDURE — 83540 ASSAY OF IRON: CPT

## 2023-06-17 PROCEDURE — 83550 IRON BINDING TEST: CPT

## 2023-06-17 PROCEDURE — 83735 ASSAY OF MAGNESIUM: CPT

## 2023-06-17 PROCEDURE — 82607 VITAMIN B-12: CPT

## 2023-06-17 PROCEDURE — 85025 COMPLETE CBC W/AUTO DIFF WBC: CPT

## 2023-06-17 PROCEDURE — 99232 SBSQ HOSP IP/OBS MODERATE 35: CPT

## 2023-06-17 PROCEDURE — 1200000003 HC TELEMETRY R&B

## 2023-06-17 PROCEDURE — 82728 ASSAY OF FERRITIN: CPT

## 2023-06-17 PROCEDURE — 83036 HEMOGLOBIN GLYCOSYLATED A1C: CPT

## 2023-06-17 PROCEDURE — 2580000003 HC RX 258

## 2023-06-17 PROCEDURE — 6370000000 HC RX 637 (ALT 250 FOR IP)

## 2023-06-17 PROCEDURE — 36415 COLL VENOUS BLD VENIPUNCTURE: CPT

## 2023-06-17 RX ADMIN — PRAVASTATIN SODIUM 40 MG: 40 TABLET ORAL at 00:10

## 2023-06-17 RX ADMIN — LEVOTHYROXINE SODIUM 50 MCG: 50 TABLET ORAL at 07:32

## 2023-06-17 RX ADMIN — SODIUM CHLORIDE, PRESERVATIVE FREE 10 ML: 5 INJECTION INTRAVENOUS at 21:06

## 2023-06-18 LAB
GLUCOSE BLD STRIP.AUTO-MCNC: 169 MG/DL (ref 70–108)
GLUCOSE BLD STRIP.AUTO-MCNC: 184 MG/DL (ref 70–108)
GLUCOSE BLD STRIP.AUTO-MCNC: 223 MG/DL (ref 70–108)
GLUCOSE BLD STRIP.AUTO-MCNC: 236 MG/DL (ref 70–108)

## 2023-06-18 PROCEDURE — 1200000003 HC TELEMETRY R&B

## 2023-06-18 PROCEDURE — 6370000000 HC RX 637 (ALT 250 FOR IP)

## 2023-06-18 PROCEDURE — 99232 SBSQ HOSP IP/OBS MODERATE 35: CPT

## 2023-06-18 PROCEDURE — 82948 REAGENT STRIP/BLOOD GLUCOSE: CPT

## 2023-06-18 RX ORDER — LORAZEPAM 0.5 MG/1
0.5 TABLET ORAL EVERY 4 HOURS PRN
Status: DISCONTINUED | OUTPATIENT
Start: 2023-06-18 | End: 2023-06-19 | Stop reason: HOSPADM

## 2023-06-18 RX ORDER — MORPHINE SULFATE 20 MG/ML
2.5 SOLUTION ORAL EVERY 4 HOURS PRN
Status: DISCONTINUED | OUTPATIENT
Start: 2023-06-18 | End: 2023-06-19 | Stop reason: HOSPADM

## 2023-06-18 RX ADMIN — Medication 2.5 MG: at 15:35

## 2023-06-18 RX ADMIN — LORAZEPAM 0.5 MG: 0.5 TABLET ORAL at 17:59

## 2023-06-18 ASSESSMENT — PAIN DESCRIPTION - ORIENTATION: ORIENTATION: MID

## 2023-06-18 ASSESSMENT — PAIN SCALES - GENERAL: PAINLEVEL_OUTOF10: 7

## 2023-06-18 ASSESSMENT — PAIN - FUNCTIONAL ASSESSMENT: PAIN_FUNCTIONAL_ASSESSMENT: ACTIVITIES ARE NOT PREVENTED

## 2023-06-18 ASSESSMENT — PAIN DESCRIPTION - LOCATION: LOCATION: GENERALIZED;BACK

## 2023-06-19 VITALS
TEMPERATURE: 97.9 F | SYSTOLIC BLOOD PRESSURE: 122 MMHG | OXYGEN SATURATION: 90 % | RESPIRATION RATE: 18 BRPM | HEART RATE: 109 BPM | DIASTOLIC BLOOD PRESSURE: 58 MMHG | WEIGHT: 94.8 LBS | HEIGHT: 58 IN | BODY MASS INDEX: 19.9 KG/M2

## 2023-06-19 LAB — GLUCOSE BLD STRIP.AUTO-MCNC: 216 MG/DL (ref 70–108)

## 2023-06-19 PROCEDURE — 82948 REAGENT STRIP/BLOOD GLUCOSE: CPT

## 2023-06-19 PROCEDURE — 99239 HOSP IP/OBS DSCHRG MGMT >30: CPT

## 2023-06-19 PROCEDURE — 6370000000 HC RX 637 (ALT 250 FOR IP)

## 2023-06-19 RX ORDER — MORPHINE SULFATE 20 MG/ML
2.5 SOLUTION ORAL EVERY 4 HOURS PRN
Qty: 1 EACH | Refills: 0
Start: 2023-06-19 | End: 2023-06-22

## 2023-06-19 RX ORDER — LORAZEPAM 0.5 MG/1
0.5 TABLET ORAL EVERY 4 HOURS PRN
Qty: 18 TABLET | Refills: 0
Start: 2023-06-19 | End: 2023-07-19

## 2023-06-19 RX ADMIN — Medication 2.5 MG: at 10:51

## 2023-06-19 RX ADMIN — Medication 2.5 MG: at 03:29

## 2023-06-19 ASSESSMENT — PAIN - FUNCTIONAL ASSESSMENT: PAIN_FUNCTIONAL_ASSESSMENT: ACTIVITIES ARE NOT PREVENTED

## 2023-06-19 ASSESSMENT — PAIN SCALES - GENERAL
PAINLEVEL_OUTOF10: 7
PAINLEVEL_OUTOF10: 5

## 2023-06-19 ASSESSMENT — PAIN DESCRIPTION - DESCRIPTORS: DESCRIPTORS: ACHING;DISCOMFORT

## 2023-06-19 ASSESSMENT — PAIN DESCRIPTION - LOCATION
LOCATION: GENERALIZED
LOCATION: GENERALIZED

## 2023-06-19 NOTE — PALLIATIVE CARE
Follow Up / Progress Note        Patient:   Amy Johnson  YOB: 1926  Age:  80 y.o. Room:  [facility]04/004  MRN:  [id number]           Per chart review plan for patient to return home today with hospice. Please call palliative if further needs arise.         Electronically signed by Gerda Pierre RN on 6/19/2023 at 7:25 AM             Palliative Care Office: [phone]

## 2023-06-19 NOTE — PROGRESS NOTES
Visit made to get scripts and dnr-cc and answer questions with family and see how patient is doing. Pt is resting in bed watching tv. Denies pain at this time. Son Keyla Swift at bedside. Questions answered. Transport time set up for 1pm today. Updated nurse Yudy Mcwilliams and CNP Ирина Myers. Instructed to call office once pt gets picked up.

## 2023-06-19 NOTE — PROGRESS NOTES
Jackelin [facility] 60  OCCUPATIONAL THERAPY MISSED TREATMENT NOTE  [facility] TELEMETRY 6K  6K-[id number]      Date: 2023  Patient Name: Isaac Knight        CSN: [id number]   : 1926  (80 y.o.)  Gender: female                REASON FOR MISSED TREATMENT:  per chart review; patient going home with hospice care. OT to discontinue orders. Please re-order if plans change.

## 2023-06-19 NOTE — DISCHARGE SUMMARY
mass lesion along left side of the chest wall which could represent an additional lymph node, hematoma, or neoplasm. 4. Extensive soft tissue swelling of the left side of the chest and extensive subcutaneous edema both sides of the chest. Cannot exclude anasarca. **This report has been created using voice recognition software. It may contain minor errors which are inherent in voice recognition technology. **  Final report electronically signed by Dr. Antonio Montoya on 6/16/2023 6:48 PM       Consults:   PALLIATIVE CARE EVAL  IP CONSULT TO DIETITIAN  IP CONSULT TO HOSPICE    Discharge Medications:      Medication List        START taking these medications      LORazepam 0.5 MG tablet  Commonly known as: ATIVAN  Take 1 tablet by mouth every 4 hours as needed for Anxiety for up to 30 days. Max Daily Amount: 3 mg     morphine 20MG/ML Soln concentrated solution  Take 0.125 mLs by mouth every 4 hours as needed for Pain for up to 3 days.  Max Daily Amount: 15 mg            CONTINUE taking these medications      erythromycin 5 MG/GM ophthalmic ointment  Commonly known as: ROMYCIN     FreeStyle Lancets Misc  1 each by Does not apply route daily Dx: E11.29, check bs daily     Theratears 0.25 % Soln  Generic drug: Carboxymethylcellulose Sodium     Wheelchair Misc  R40.4, R53.1, H54.10            STOP taking these medications      acetaminophen 500 MG tablet  Commonly known as: TYLENOL     ALEVE PO     aspirin EC 81 MG EC tablet     ferrous sulfate 325 (65 Fe) MG tablet  Commonly known as: IRON 325     FREESTYLE LITE strip  Generic drug: blood glucose test strips     lisinopril 10 MG tablet  Commonly known as: PRINIVIL;ZESTRIL     metFORMIN 500 MG extended release tablet  Commonly known as: GLUCOPHAGE-XR     multivitamin per tablet     pravastatin 40 MG tablet  Commonly known as: Pravachol               Where to Get Your Medications        Information about where to get these medications is not yet available    Ask your nurse or

## 2023-06-19 NOTE — PLAN OF CARE
Problem: Discharge Planning  Goal: Discharge to home or other facility with appropriate resources  Outcome: Adequate for Discharge     Problem: Skin/Tissue Integrity  Goal: Absence of new skin breakdown  Description: 1. Monitor for areas of redness and/or skin breakdown  2. Assess vascular access sites hourly  3. Every 4-6 hours minimum:  Change oxygen saturation probe site  4. Every 4-6 hours:  If on nasal continuous positive airway pressure, respiratory therapy assess nares and determine need for appliance change or resting period.   Outcome: Adequate for Discharge     Problem: Safety - Adult  Goal: Free from fall injury  Outcome: Adequate for Discharge     Problem: ABCDS Injury Assessment  Goal: Absence of physical injury  Outcome: Adequate for Discharge     Problem: Pain  Goal: Verbalizes/displays adequate comfort level or baseline comfort level  Outcome: Adequate for Discharge     Problem: Chronic Conditions and Co-morbidities  Goal: Patient's chronic conditions and co-morbidity symptoms are monitored and maintained or improved  Outcome: Adequate for Discharge     Problem: Neurosensory - Adult  Goal: Achieves stable or improved neurological status  Outcome: Adequate for Discharge

## 2023-06-19 NOTE — CARE COORDINATION
6/19/23, 10:09 AM EDT    Patient goals/plan/ treatment preferences discussed by  and . Patient goals/plan/ treatment preferences reviewed with patient/ family. Patient/ family verbalize understanding of discharge plan and are in agreement with goal/plan/treatment preferences. Understanding was demonstrated using the teach back method. AVS provided by RN at time of discharge, which includes all necessary medical information pertaining to the patients current course of illness, treatment, post-discharge goals of care, and treatment preferences. Services At/After Discharge: Hospice and In ambulance       IMM Letter  IMM Letter given to Patient/Family/Significant other/Guardian/POA/by[de-identified] Dre Jones RN  IMM Letter date given[de-identified]  ( delivered 6/17)  IMM Letter time given[de-identified] [id number]     Pt is being discharged home today with her family and [facility]. LACP scheduled for 1pm.  SW updated pt, son and hospice RN.   Floor RN Dre Jones aware

## 2023-06-20 ENCOUNTER — CARE COORDINATION (OUTPATIENT)
Dept: CASE MANAGEMENT | Age: 88
End: 2023-06-20

## 2023-06-20 LAB
EKG ATRIAL RATE: 94 BPM
EKG P AXIS: 27 DEGREES
EKG P-R INTERVAL: 168 MS
EKG Q-T INTERVAL: 330 MS
EKG QRS DURATION: 64 MS
EKG QTC CALCULATION (BAZETT): 412 MS
EKG R AXIS: 38 DEGREES
EKG T AXIS: 57 DEGREES
EKG VENTRICULAR RATE: 94 BPM

## 2023-06-20 NOTE — CARE COORDINATION
Confirmed Hospice enrollment with Angela Duarte @ [facility]. Admitted yesterday to their services. CTN calls not needed.

## 2023-06-21 LAB
BACTERIA BLD AEROBE CULT: NORMAL
BACTERIA BLD AEROBE CULT: NORMAL

## 2023-07-06 ENCOUNTER — TELEPHONE (OUTPATIENT)
Dept: FAMILY MEDICINE CLINIC | Age: 88
End: 2023-07-06

## 2024-01-04 NOTE — TELEPHONE ENCOUNTER
"Patient had covid on 12/14. Was feeling better but continues to have a mild cough and has developed SOB with exertion. States her HR goes up to 150 when going upstairs, comes back down after 5 minutes.  Was seen in the ER 2 days ago, has an appt scheduled at the office on 1/10.  Patient feels she needs to be seen sooner than the 10th. Please advise.       Reason for Disposition   Patient wants to be seen    Answer Assessment - Initial Assessment Questions  1. RESPIRATORY STATUS: \"Describe your breathing?\" (e.g., wheezing, shortness of breath, unable to speak, severe coughing)       ER with SOB 2 days again.   2. ONSET: \"When did this breathing problem begin?\"       Had covid on 12/14, tested negative on 12/20.   3. PATTERN \"Does the difficult breathing come and go, or has it been constant since it started?\"       SOB with walking and stairs sometimes when talking.   4. SEVERITY: \"How bad is your breathing?\" (e.g., mild, moderate, severe)     - MILD: No SOB at rest, mild SOB with walking, speaks normally in sentences, can lay down, no retractions, pulse < 100.     - MODERATE: SOB at rest, SOB with minimal exertion and prefers to sit, cannot lie down flat, speaks in phrases, mild retractions, audible wheezing, pulse 100-120.     - SEVERE: Very SOB at rest, speaks in single words, struggling to breathe, sitting hunched forward, retractions, pulse > 120       HR elevated to 150 after exertion with SOB  5. RECURRENT SYMPTOM: \"Have you had difficulty breathing before?\" If Yes, ask: \"When was the last time?\" and \"What happened that time?\"       no  6. CARDIAC HISTORY: \"Do you have any history of heart disease?\" (e.g., heart attack, angina, bypass surgery, angioplasty)       no  7. LUNG HISTORY: \"Do you have any history of lung disease?\"  (e.g., pulmonary embolus, asthma, emphysema)      Sports asthma as a kid  8. CAUSE: \"What do you think is causing the breathing problem?\"       Post covid  9. OTHER SYMPTOMS: \"Do you have " Hyacinth Mcneill notified and verbalized understanding "any other symptoms? (e.g., dizziness, runny nose, cough, chest pain, fever)      Slight cough and runny nose  10. PREGNANCY: \"Is there any chance you are pregnant?\" \"When was your last menstrual period?\"        no  11. TRAVEL: \"Have you traveled out of the country in the last month?\" (e.g., travel history, exposures)        no    Protocols used: Breathing Difficulty-ADULT-[place]    "